# Patient Record
Sex: MALE | Race: WHITE | Employment: OTHER | URBAN - NONMETROPOLITAN AREA
[De-identification: names, ages, dates, MRNs, and addresses within clinical notes are randomized per-mention and may not be internally consistent; named-entity substitution may affect disease eponyms.]

---

## 2017-01-01 ENCOUNTER — HOSPITAL ENCOUNTER (OUTPATIENT)
Dept: CARDIAC REHAB | Age: 72
Discharge: HOME | End: 2017-01-31
Admitting: INTERNAL MEDICINE

## 2017-01-04 LAB — GLUCOSE BLD-MCNC: 159 MG/DL (ref 70–99)

## 2017-01-06 LAB — GLUCOSE BLD-MCNC: 136 MG/DL (ref 70–99)

## 2017-01-09 LAB — GLUCOSE BLD-MCNC: 145 MG/DL (ref 70–99)

## 2017-01-11 LAB — GLUCOSE BLD-MCNC: 143 MG/DL (ref 70–99)

## 2017-01-23 ENCOUNTER — HOSPITAL ENCOUNTER (OUTPATIENT)
Dept: CARDIAC REHAB | Age: 72
Discharge: HOME | End: 2017-01-31
Attending: INTERNAL MEDICINE | Admitting: INTERNAL MEDICINE

## 2017-02-01 ENCOUNTER — HOSPITAL ENCOUNTER (OUTPATIENT)
Dept: CARDIAC REHAB | Age: 72
Discharge: HOME | End: 2017-02-28
Attending: INTERNAL MEDICINE | Admitting: INTERNAL MEDICINE

## 2017-02-13 RX ORDER — CARVEDILOL 6.25 MG/1
6.25 TABLET ORAL 2 TIMES DAILY
Qty: 180 TABLET | Refills: 3 | Status: SHIPPED | OUTPATIENT
Start: 2017-02-13 | End: 2019-01-24 | Stop reason: SDUPTHER

## 2017-03-01 ENCOUNTER — HOSPITAL ENCOUNTER (OUTPATIENT)
Dept: CARDIAC REHAB | Age: 72
Discharge: HOME | End: 2017-03-02
Attending: INTERNAL MEDICINE | Admitting: INTERNAL MEDICINE

## 2017-03-01 ENCOUNTER — HOSPITAL ENCOUNTER (OUTPATIENT)
Dept: CARDIAC REHAB | Age: 72
Discharge: HOME | End: 2017-03-31
Attending: INTERNAL MEDICINE | Admitting: INTERNAL MEDICINE

## 2017-03-30 ENCOUNTER — OFFICE VISIT (OUTPATIENT)
Dept: CARDIOLOGY CLINIC | Age: 72
End: 2017-03-30

## 2017-03-30 VITALS
HEIGHT: 71 IN | RESPIRATION RATE: 12 BRPM | WEIGHT: 227 LBS | OXYGEN SATURATION: 94 % | DIASTOLIC BLOOD PRESSURE: 62 MMHG | HEART RATE: 66 BPM | BODY MASS INDEX: 31.78 KG/M2 | SYSTOLIC BLOOD PRESSURE: 110 MMHG

## 2017-03-30 DIAGNOSIS — E11.59 TYPE 2 DIABETES MELLITUS WITH OTHER CIRCULATORY COMPLICATION: ICD-10-CM

## 2017-03-30 DIAGNOSIS — I10 ESSENTIAL HYPERTENSION: ICD-10-CM

## 2017-03-30 DIAGNOSIS — I25.810 CORONARY ATHEROSCLEROSIS OF AUTOLOGOUS ARTERY BYPASS GRAFT WITHOUT ANGINA: ICD-10-CM

## 2017-03-30 DIAGNOSIS — Z95.1 S/P CABG X 4: Primary | ICD-10-CM

## 2017-03-30 DIAGNOSIS — E78.5 DYSLIPIDEMIA: ICD-10-CM

## 2017-03-30 PROCEDURE — 99214 OFFICE O/P EST MOD 30 MIN: CPT | Performed by: INTERNAL MEDICINE

## 2017-04-01 ENCOUNTER — HOSPITAL ENCOUNTER (OUTPATIENT)
Dept: CARDIAC REHAB | Age: 72
Discharge: HOME | End: 2017-04-30
Attending: INTERNAL MEDICINE | Admitting: INTERNAL MEDICINE

## 2017-05-01 ENCOUNTER — HOSPITAL ENCOUNTER (OUTPATIENT)
Dept: CARDIAC REHAB | Age: 72
Discharge: HOME | End: 2017-05-31
Attending: INTERNAL MEDICINE | Admitting: INTERNAL MEDICINE

## 2017-06-01 ENCOUNTER — HOSPITAL ENCOUNTER (OUTPATIENT)
Dept: CARDIAC REHAB | Age: 72
Discharge: HOME | End: 2017-06-30
Attending: INTERNAL MEDICINE | Admitting: INTERNAL MEDICINE

## 2017-06-06 ENCOUNTER — HOSPITAL ENCOUNTER (OUTPATIENT)
Dept: LAB | Age: 72
Discharge: HOME | End: 2017-06-06
Attending: FAMILY MEDICINE | Admitting: FAMILY MEDICINE

## 2017-06-06 LAB
ALBUMIN SERPL-MCNC: 4.1 GM/DL (ref 3.4–5)
ALP BLD-CCNC: 48 IU/L (ref 40–129)
ALT SERPL-CCNC: 26 U/L (ref 10–40)
ANION GAP SERPL CALCULATED.3IONS-SCNC: 9 MMOL/L (ref 4–16)
AST SERPL-CCNC: 24 IU/L (ref 15–37)
BILIRUB SERPL-MCNC: 0.5 MG/DL (ref 0–1)
BUN BLDV-MCNC: 15 MG/DL (ref 6–23)
CALCIUM SERPL-MCNC: 9.3 MG/DL (ref 8.3–10.6)
CHLORIDE BLD-SCNC: 102 MMOL/L (ref 99–110)
CHOLESTEROL, FASTING: 152 MG/DL
CO2: 26 MMOL/L (ref 21–32)
CREAT SERPL-MCNC: 1.1 MG/DL (ref 0.9–1.3)
ESTIMATED AVERAGE GLUCOSE: 157 MG/DL
GFR AFRICAN AMERICAN: >60 ML/MIN/1.73M2
GFR NON-AFRICAN AMERICAN: >60 ML/MIN/1.73M2
GLUCOSE FASTING: 178 MG/DL (ref 70–99)
HBA1C MFR BLD: 7.1 % (ref 4.2–6.3)
HDLC SERPL-MCNC: 36 MG/DL
LDL CHOLESTEROL DIRECT: 87 MG/DL
POTASSIUM SERPL-SCNC: 4 MMOL/L (ref 3.5–5.1)
PROSTATE SPECIFIC ANTIGEN: 2.86 NG/ML (ref 0–4)
SODIUM BLD-SCNC: 137 MMOL/L (ref 135–145)
TOTAL PROTEIN: 7.5 GM/DL (ref 6.4–8.2)
TRIGLYCERIDE, FASTING: 181 MG/DL
TSH HIGH SENSITIVITY: 2.18 UIU/ML (ref 0.27–4.2)

## 2017-07-01 ENCOUNTER — HOSPITAL ENCOUNTER (OUTPATIENT)
Dept: CARDIAC REHAB | Age: 72
Discharge: HOME | End: 2017-07-31
Attending: INTERNAL MEDICINE | Admitting: INTERNAL MEDICINE

## 2017-08-01 ENCOUNTER — HOSPITAL ENCOUNTER (OUTPATIENT)
Dept: CARDIAC REHAB | Age: 72
Discharge: HOME | End: 2017-08-31
Attending: INTERNAL MEDICINE | Admitting: INTERNAL MEDICINE

## 2017-09-01 ENCOUNTER — HOSPITAL ENCOUNTER (OUTPATIENT)
Dept: CARDIAC REHAB | Age: 72
Discharge: HOME | End: 2017-09-30
Attending: INTERNAL MEDICINE | Admitting: INTERNAL MEDICINE

## 2017-10-01 ENCOUNTER — HOSPITAL ENCOUNTER (OUTPATIENT)
Dept: CARDIAC REHAB | Age: 72
Discharge: HOME | End: 2017-10-31
Attending: INTERNAL MEDICINE | Admitting: INTERNAL MEDICINE

## 2017-10-05 ENCOUNTER — OFFICE VISIT (OUTPATIENT)
Dept: CARDIOLOGY CLINIC | Age: 72
End: 2017-10-05

## 2017-10-05 VITALS
HEIGHT: 71 IN | SYSTOLIC BLOOD PRESSURE: 112 MMHG | WEIGHT: 228 LBS | RESPIRATION RATE: 16 BRPM | BODY MASS INDEX: 31.92 KG/M2 | HEART RATE: 68 BPM | DIASTOLIC BLOOD PRESSURE: 70 MMHG

## 2017-10-05 DIAGNOSIS — I25.810 CORONARY ATHEROSCLEROSIS OF AUTOLOGOUS ARTERY BYPASS GRAFT WITHOUT ANGINA: ICD-10-CM

## 2017-10-05 DIAGNOSIS — Z95.1 S/P CABG X 4: ICD-10-CM

## 2017-10-05 DIAGNOSIS — E11.59 TYPE 2 DIABETES MELLITUS WITH OTHER CIRCULATORY COMPLICATION, UNSPECIFIED LONG TERM INSULIN USE STATUS: Primary | ICD-10-CM

## 2017-10-05 DIAGNOSIS — I10 ESSENTIAL HYPERTENSION: ICD-10-CM

## 2017-10-05 DIAGNOSIS — E78.5 DYSLIPIDEMIA: ICD-10-CM

## 2017-10-05 PROCEDURE — 99214 OFFICE O/P EST MOD 30 MIN: CPT | Performed by: INTERNAL MEDICINE

## 2017-10-05 NOTE — PROGRESS NOTES
OFFICE FOLLOW UP NOTE  Chief Complaint: post CABG X4     HPI:   Mike Peng is a 80y.o. year old who has history as noted below. 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 Doing well in cardiac rehab with no complaints   Exercise tolerance has improved. He is happy with his results. No chest pain, no shortness of breath . Scar has healed well. He had  CABG X4 after presenting with unstable angina in Sept 2016 . He had  LHC showing multivessel disease . His initial presentation was shortness of breath . He is C/o some back pain, left shoulder pain, cannot sleep on left side. HE feels there is a knot between his shoulder blades. Current Outpatient Prescriptions   Medication Sig Dispense Refill    carvedilol (COREG) 6.25 MG tablet Take 1 tablet by mouth 2 times daily 180 tablet 3    valsartan (DIOVAN) 80 MG tablet Take 80 mg by mouth daily      Acetaminophen (TYLENOL PO) Take by mouth as needed Over The Counter      aspirin 81 MG EC tablet Take 81 mg by mouth every morning Over The Counter      metFORMIN (GLUCOPHAGE) 500 MG tablet Take 500 mg by mouth 2 times daily Take Two Twice Daily      rosuvastatin (CRESTOR) 10 MG tablet Take 10 mg by mouth every morning       sitaGLIPtin (JANUVIA) 100 MG tablet Take 100 mg by mouth every morning       canagliflozin (INVOKANA) 300 MG TABS tablet Take 300 mg by mouth every morning       insulin glargine (LANTUS SOLOSTAR) 100 UNIT/ML injection pen Inject 32 Units into the skin nightly        No current facility-administered medications for this visit. Allergies:   Exenatide;  Lisinopril; and Sulfa antibiotics    Patient History:  Past Medical History:   Diagnosis Date    Acid reflux     Anemia     Arthritis     \"Wrists, Knees, Ankles And Spine\"    Belching     \"All The Time\"    CAD (coronary artery disease)     Sees Dr. Frannie Gallegos Chronic back pain     [facility] Dx [facility]'s    \"I Go To [facility] For [facility], I See Addy Casiano CNP\"    Difficult intubation     Diverticulitis Dx Early 18's    H/O echocardiogram 07/25/2016    The left ventricular size is normal. There is mild concentric LVH. Trace to mild MR     H/O exercise stress test 07/25/2016    Patient tolerated well without focal complaints. Heart rate response Appropriate. Normal findings.      H/O percutaneous left heart catheterization 09/02/2016    LAD 80%, Cx 80%, RCA 50% prox, PDA 90% mid    Hyperlipidemia     Hypertension     Mononucleosis Dx 1964    Pleurisy Dx 1964    Ringing in the ears     S/P CABG x 4 09/13/2016    Lima to LAD & D1, SVG PDA & Cx M1    Shortness of breath on exertion     Wears glasses      Past Surgical History:   Procedure Laterality Date    CARDIAC CATHETERIZATION  09/02/2016    CARDIAC SURGERY  09/13/2016    CABG x 4 Lima to the LAD and D1, SVG to PDA, SVG to CX M1    COLONOSCOPY  Early 1970's    ENDOSCOPY, COLON, DIAGNOSTIC  Late 1980's    LEG SURGERY Right 2000's    Gangrene Right Lower Leg    OTHER SURGICAL HISTORY Bilateral Last Done In 1990's    \"Multiple Ear Surgeries To Patch Ear Drum\"    WRIST SURGERY Right 1998 Or 1999    \"Lump Removed Top Of Right Wrist, Pinched Ulnar Nerve Also Done\"     Family History   Problem Relation Age of Onset    Heart Disease Mother      Open Heart Surgery    Vision Loss Mother     Hearing Loss Mother     Cancer Father      Prostate Cancer    Heart Disease Father      Open Heart Surgery    Heart Disease Brother     Arthritis Brother     Diabetes Son    [facility] Other Son      \"2 Surgeries On Pancreas\"     Social History   Substance Use Topics    Smoking status: Former Smoker     Packs/day: 1.50     Years: 6.00     Types: Cigarettes     Start date: 1964     Quit date: 1970    Smokeless tobacco: Never Used    Alcohol use Yes      Comment: \"Very Seldom, Maybe Once Every Month Or More\"        Review of Systems:   · Constitutional: No Fever or Weight Loss   · Eyes: No Decreased Vision  · ENT: No Headaches, Hearing Loss or Vertigo  · Cardiovascular: as per note above   · Respiratory: No cough or wheezing and as per note above. · Gastrointestinal: No abdominal pain, appetite loss, blood in stools, constipation, diarrhea or heartburn  · Genitourinary: No dysuria, trouble voiding, or hematuria  · Musculoskeletal:  None  · Integumentary: No rash or pruritis  · Neurological: No TIA or stroke symptoms  · Psychiatric: No anxiety or depression  · Endocrine: No malaise, fatigue or temperature intolerance  · Hematologic/Lymphatic: No bleeding problems, blood clots or swollen lymph nodes  · Allergic/Immunologic: No nasal congestion or hives    Objective:      Physical Exam:  /70 (Site: Left Arm, Position: Sitting, Cuff Size: Large Adult)  Pulse 68  Resp 16  Ht 5' 11\" (1.803 m)  Wt 228 lb (103.4 kg)  BMI 31.8 kg/m2  Wt Readings from Last 3 Encounters:   10/05/17 228 lb (103.4 kg)   03/30/17 227 lb (103 kg)   10/19/16 211 lb 3.2 oz (95.8 kg)     Body mass index is 31.8 kg/(m^2). GENERAL - Alert, oriented, pleasant, in no apparent distress. Head unremarkable  Eyes -  Not injected conjunctiva  ENT - Normal mucosa  Neck - Supple. No jugular venous distention noted. No carotid bruits. Cardiovascular - Normal S1 and S2 without obvious murmur or gallop. No signs of volume over load. Rate and rhythm is regular, surgical scar healing well. Sternal wound is healed. Stable   Extremities - No cyanosis, clubbing, or significant edema. Pulmonary - No respiratory distress. No wheezes or rales. Pulses - Bilateral radial and pedal pulses normal  Abdomen - No tenderness  Musculoskeletal - Normal strength  Neurologic - There are no gross focal neurologic abnormalities.   Skin - No rash  Affect - Normal mood      Lab Results   Component Value Date    LABA1C 7.1 (H) 06/06/2017    LABA1C 7.3 (H) 09/02/2016     Lab Results   Component Value Date    CHOL 137 03/15/2016    TRIG 180 (H) 03/15/2016    HDL 36 (L) 06/06/2017    LDLDIRECT 87 06/06/2017     Lab Results   Component Value Date    ALT 26 06/06/2017    AST 24 06/06/2017         Assessment & Plan:     1. Type 2 diabetes mellitus with other circulatory complication, unspecified long term insulin use status    2. S/P CABG x 4    3. Essential hypertension    4. 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 Dyslipidemia         No problem-specific Assessment & Plan notes found for this encounter. S/P CABG x 4  CABG X 4 Endarterectomy Cx marginal,LIMA to lad and D1,SVG to PDA,SVG to Cx M1In Sept 2016. Finished rehab doing very well. , exercise tolerance is good. He is back to wroking on [employer].     Essential hypertension  Bp at home is well controlled.     Type 2 diabetes mellitus with circulatory disorder ([address]  Very good control, Log shows fasting sugars in 140's last Hba 1c was 7.2 in 2016 . Goes to [facility] endocrinology     Dyslipidemia :  He had lab work recently,  Lipid profile was reviewed with patient. He will have repeat lab work with dr Stuart Daniel in few weeks. Return in about 6 months (around 4/5/2018).

## 2017-10-05 NOTE — MR AVS SNAPSHOT
After Visit Summary             Rober Stuart   10/5/2017 10:20 AM   Office Visit    Description:  Male : 1945   Provider:  Demi Leblanc MD   Department:  Cardiology [facility]] and Future Appointments         Below is a list of your follow-up and future appointments. This may not be a complete list as you may have made appointments directly with providers that we are not aware of or your providers may have made some for you. Please call your providers to confirm appointments. It is important to keep your appointments. Please bring your current insurance card, photo ID, co-pay, and all medication bottles to your appointment. If self-pay, payment is expected at the time of service. Your To-Do List     Future Appointments Provider Department Dept Phone    2018 2:00 PM Demi Leblanc MD Cardiology [address] [place] [address] Drive [phone]    Please arrive 15 minutes prior to appointment, bring photo ID and insurance card. Follow-Up    Return in about 6 months (around 2018). Information from Your Visit        Department     Name Address Phone Fax    Cardiology [facility] [address] [address] [phone]      You Were Seen for:         Comments    Type 2 diabetes mellitus with other circulatory complication, unspecified long term insulin use status   [[id number]]         Vital Signs     Blood Pressure Pulse Respirations Height Weight Body Mass Index    112/70 (Site: Left Arm, Position: Sitting, Cuff Size: Large Adult) 68 16 5' 11\" (1.803 m) 228 lb (103.4 kg) 31.8 kg/m2    Smoking Status                   Former Smoker           Additional Information about your Body Mass Index (BMI)           Your BMI as listed above is considered obese (30 or more). BMI is an estimate of body fat, calculated from your height and weight.   The higher Hepatitis C screening is recommended for all adults regardless of risk factors born between [facility] at least once (lifetime) who have never been tested. 1945    Diabetic Foot Exam 10/6/1956    Eye Exam By An Eye Doctor 10/6/1956    Tetanus Combination Vaccine (1 - Tdap) 10/6/1965    Colonoscopy 10/6/1996    Zoster Vaccine 10/6/2006    Pneumococcal Vaccines (two) for all adults aged 72 and over (1 of 2 - PCV13) 10/6/2011    Urine Check For Kidney Problems 3/15/2017    Yearly Flu Vaccine (1) 9/1/2017    Hemoglobin A1C (Test For Long-Term Glucose Control) 6/6/2018    Cholesterol Screening 6/6/2018            Kerat Signup           Our records indicate that you have an active [employer] account. You can view your After Visit Summary by going to [email or web address] ([email or web address]) and logging in with your [employer] username and password. If you don't have a [employer] username and password but a parent or guardian has access to your record, the parent or guardian should login with their own [employer] username and password and access your record to view the After Visit Summary. Additional Information  If you have questions, please contact the physician practice where you receive care. Remember, [employer] is NOT to be used for urgent needs. For medical emergencies, dial 911. For questions regarding your [employer] account call [phone]. If you have a clinical question, please call your doctor's office.

## 2017-11-01 ENCOUNTER — HOSPITAL ENCOUNTER (OUTPATIENT)
Dept: CARDIAC REHAB | Age: 72
Discharge: HOME | End: 2017-11-30
Attending: INTERNAL MEDICINE | Admitting: INTERNAL MEDICINE

## 2017-12-01 ENCOUNTER — HOSPITAL ENCOUNTER (OUTPATIENT)
Dept: CARDIAC REHAB | Age: 72
Discharge: HOME | End: 2017-12-31
Attending: INTERNAL MEDICINE | Admitting: INTERNAL MEDICINE

## 2018-01-01 ENCOUNTER — HOSPITAL ENCOUNTER (OUTPATIENT)
Dept: CARDIAC REHAB | Age: 73
Discharge: HOME | End: 2018-01-31
Attending: INTERNAL MEDICINE | Admitting: INTERNAL MEDICINE

## 2018-02-01 ENCOUNTER — HOSPITAL ENCOUNTER (OUTPATIENT)
Dept: CARDIAC REHAB | Age: 73
Discharge: HOME | End: 2018-02-28
Attending: INTERNAL MEDICINE | Admitting: INTERNAL MEDICINE

## 2018-03-01 ENCOUNTER — HOSPITAL ENCOUNTER (OUTPATIENT)
Dept: CARDIAC REHAB | Age: 73
Discharge: HOME | End: 2018-03-31
Attending: INTERNAL MEDICINE | Admitting: INTERNAL MEDICINE

## 2018-04-01 ENCOUNTER — HOSPITAL ENCOUNTER (OUTPATIENT)
Dept: CARDIAC REHAB | Age: 73
Discharge: HOME | End: 2018-04-30
Attending: INTERNAL MEDICINE | Admitting: INTERNAL MEDICINE

## 2018-04-12 ENCOUNTER — OFFICE VISIT (OUTPATIENT)
Dept: CARDIOLOGY CLINIC | Age: 73
End: 2018-04-12

## 2018-04-12 VITALS
SYSTOLIC BLOOD PRESSURE: 112 MMHG | BODY MASS INDEX: 32.06 KG/M2 | WEIGHT: 229 LBS | HEART RATE: 76 BPM | DIASTOLIC BLOOD PRESSURE: 66 MMHG | RESPIRATION RATE: 16 BRPM | HEIGHT: 71 IN

## 2018-04-12 DIAGNOSIS — I10 ESSENTIAL HYPERTENSION: ICD-10-CM

## 2018-04-12 DIAGNOSIS — I25.810 CORONARY ATHEROSCLEROSIS OF AUTOLOGOUS ARTERY BYPASS GRAFT WITHOUT ANGINA: Primary | ICD-10-CM

## 2018-04-12 DIAGNOSIS — Z95.1 S/P CABG X 4: ICD-10-CM

## 2018-04-12 DIAGNOSIS — E78.5 DYSLIPIDEMIA: ICD-10-CM

## 2018-04-12 DIAGNOSIS — E11.59 TYPE 2 DIABETES MELLITUS WITH OTHER CIRCULATORY COMPLICATION, UNSPECIFIED LONG TERM INSULIN USE STATUS: ICD-10-CM

## 2018-04-12 PROCEDURE — 1036F TOBACCO NON-USER: CPT | Performed by: INTERNAL MEDICINE

## 2018-04-12 PROCEDURE — G8598 ASA/ANTIPLAT THER USED: HCPCS | Performed by: INTERNAL MEDICINE

## 2018-04-12 PROCEDURE — 3046F HEMOGLOBIN A1C LEVEL >9.0%: CPT | Performed by: INTERNAL MEDICINE

## 2018-04-12 PROCEDURE — 3017F COLORECTAL CA SCREEN DOC REV: CPT | Performed by: INTERNAL MEDICINE

## 2018-04-12 PROCEDURE — 1123F ACP DISCUSS/DSCN MKR DOCD: CPT | Performed by: INTERNAL MEDICINE

## 2018-04-12 PROCEDURE — 2022F DILAT RTA XM EVC RTNOPTHY: CPT | Performed by: INTERNAL MEDICINE

## 2018-04-12 PROCEDURE — G8427 DOCREV CUR MEDS BY ELIG CLIN: HCPCS | Performed by: INTERNAL MEDICINE

## 2018-04-12 PROCEDURE — 4040F PNEUMOC VAC/ADMIN/RCVD: CPT | Performed by: INTERNAL MEDICINE

## 2018-04-12 PROCEDURE — G8417 CALC BMI ABV UP PARAM F/U: HCPCS | Performed by: INTERNAL MEDICINE

## 2018-04-12 PROCEDURE — 99214 OFFICE O/P EST MOD 30 MIN: CPT | Performed by: INTERNAL MEDICINE

## 2018-05-01 ENCOUNTER — HOSPITAL ENCOUNTER (OUTPATIENT)
Dept: CARDIAC REHAB | Age: 73
Discharge: HOME | End: 2018-05-31
Attending: INTERNAL MEDICINE | Admitting: INTERNAL MEDICINE

## 2018-06-11 ENCOUNTER — HOSPITAL ENCOUNTER (OUTPATIENT)
Dept: LAB | Age: 73
Discharge: HOME | End: 2018-06-11
Attending: FAMILY MEDICINE | Admitting: FAMILY MEDICINE

## 2018-06-11 LAB
ALBUMIN SERPL-MCNC: 4.3 GM/DL (ref 3.4–5)
ALP BLD-CCNC: 44 IU/L (ref 40–129)
ALT SERPL-CCNC: 33 U/L (ref 10–40)
ANION GAP SERPL CALCULATED.3IONS-SCNC: 9 MMOL/L (ref 4–16)
AST SERPL-CCNC: 31 IU/L (ref 15–37)
BILIRUB SERPL-MCNC: 0.7 MG/DL (ref 0–1)
BUN BLDV-MCNC: 13 MG/DL (ref 6–23)
CALCIUM SERPL-MCNC: 9.2 MG/DL (ref 8.3–10.6)
CHLORIDE BLD-SCNC: 100 MMOL/L (ref 99–110)
CHOLESTEROL, FASTING: 154 MG/DL
CO2: 31 MMOL/L (ref 21–32)
CREAT SERPL-MCNC: 1.2 MG/DL (ref 0.9–1.3)
CREATININE URINE: 91 MG/DL (ref 39–259)
ESTIMATED AVERAGE GLUCOSE: 160 MG/DL
GFR AFRICAN AMERICAN: >60 ML/MIN/1.73M2
GFR NON-AFRICAN AMERICAN: 60 ML/MIN/1.73M2
GLUCOSE FASTING: 151 MG/DL (ref 70–99)
HBA1C MFR BLD: 7.2 % (ref 4.2–6.3)
HDLC SERPL-MCNC: 35 MG/DL
LDL CHOLESTEROL DIRECT: 96 MG/DL
MICROALBUMIN/CREAT 24H UR: <1.2 MG/DL
MICROALBUMIN/CREAT UR-RTO: NORMAL MG/G CREAT (ref 0–30)
POTASSIUM SERPL-SCNC: 4.2 MMOL/L (ref 3.5–5.1)
PROSTATE SPECIFIC ANTIGEN: 3.83 NG/ML (ref 0–4)
SODIUM BLD-SCNC: 140 MMOL/L (ref 135–145)
TOTAL PROTEIN: 7.7 GM/DL (ref 6.4–8.2)
TRIGLYCERIDE, FASTING: 154 MG/DL

## 2018-08-30 ENCOUNTER — TELEPHONE (OUTPATIENT)
Dept: CARDIOLOGY CLINIC | Age: 73
End: 2018-08-30

## 2018-12-11 ENCOUNTER — HOSPITAL ENCOUNTER (OUTPATIENT)
Age: 73
Discharge: HOME | End: 2018-12-11
Payer: MEDICARE

## 2018-12-11 LAB
ALBUMIN SERPL-MCNC: 4.6 GM/DL (ref 3.4–5)
ALP BLD-CCNC: 53 IU/L (ref 40–129)
ALT SERPL-CCNC: 37 U/L (ref 10–40)
ANION GAP SERPL CALCULATED.3IONS-SCNC: 12 MMOL/L (ref 4–16)
AST SERPL-CCNC: 31 IU/L (ref 15–37)
BILIRUB SERPL-MCNC: 0.9 MG/DL (ref 0–1)
BUN BLDV-MCNC: 16 MG/DL (ref 6–23)
CALCIUM SERPL-MCNC: 9.7 MG/DL (ref 8.3–10.6)
CHLORIDE BLD-SCNC: 100 MMOL/L (ref 99–110)
CHOLESTEROL, FASTING: 135 MG/DL
CO2: 24 MMOL/L (ref 21–32)
CREAT SERPL-MCNC: 1.1 MG/DL (ref 0.9–1.3)
ESTIMATED AVERAGE GLUCOSE: 157 MG/DL
GFR AFRICAN AMERICAN: >60 ML/MIN/1.73M2
GFR NON-AFRICAN AMERICAN: >60 ML/MIN/1.73M2
GLUCOSE FASTING: 145 MG/DL (ref 70–99)
HBA1C MFR BLD: 7.1 % (ref 4.2–6.3)
HDLC SERPL-MCNC: 35 MG/DL
LDL CHOLESTEROL DIRECT: 88 MG/DL
POTASSIUM SERPL-SCNC: 4.2 MMOL/L (ref 3.5–5.1)
SODIUM BLD-SCNC: 136 MMOL/L (ref 135–145)
TOTAL PROTEIN: 7.9 GM/DL (ref 6.4–8.2)
TRIGLYCERIDE, FASTING: 135 MG/DL

## 2018-12-11 PROCEDURE — 83036 HEMOGLOBIN GLYCOSYLATED A1C: CPT

## 2018-12-11 PROCEDURE — 80061 LIPID PANEL: CPT

## 2018-12-11 PROCEDURE — 80053 COMPREHEN METABOLIC PANEL: CPT

## 2018-12-11 PROCEDURE — 36415 COLL VENOUS BLD VENIPUNCTURE: CPT

## 2019-01-17 ENCOUNTER — OFFICE VISIT (OUTPATIENT)
Dept: CARDIOLOGY CLINIC | Age: 74
End: 2019-01-17
Payer: MEDICARE

## 2019-01-17 VITALS
WEIGHT: 223 LBS | HEIGHT: 71 IN | HEART RATE: 68 BPM | RESPIRATION RATE: 16 BRPM | DIASTOLIC BLOOD PRESSURE: 72 MMHG | BODY MASS INDEX: 31.22 KG/M2 | SYSTOLIC BLOOD PRESSURE: 116 MMHG

## 2019-01-17 DIAGNOSIS — I10 ESSENTIAL HYPERTENSION: ICD-10-CM

## 2019-01-17 DIAGNOSIS — I25.810 CORONARY ATHEROSCLEROSIS OF AUTOLOGOUS ARTERY BYPASS GRAFT WITHOUT ANGINA: ICD-10-CM

## 2019-01-17 DIAGNOSIS — Z95.1 S/P CABG X 4: Primary | ICD-10-CM

## 2019-01-17 DIAGNOSIS — E78.5 DYSLIPIDEMIA: ICD-10-CM

## 2019-01-17 DIAGNOSIS — E11.59 TYPE 2 DIABETES MELLITUS WITH OTHER CIRCULATORY COMPLICATION, UNSPECIFIED WHETHER LONG TERM INSULIN USE (HCC): ICD-10-CM

## 2019-01-17 PROCEDURE — 3017F COLORECTAL CA SCREEN DOC REV: CPT | Performed by: INTERNAL MEDICINE

## 2019-01-17 PROCEDURE — 93000 ELECTROCARDIOGRAM COMPLETE: CPT | Performed by: INTERNAL MEDICINE

## 2019-01-17 PROCEDURE — G8417 CALC BMI ABV UP PARAM F/U: HCPCS | Performed by: INTERNAL MEDICINE

## 2019-01-17 PROCEDURE — G8427 DOCREV CUR MEDS BY ELIG CLIN: HCPCS | Performed by: INTERNAL MEDICINE

## 2019-01-17 PROCEDURE — G8484 FLU IMMUNIZE NO ADMIN: HCPCS | Performed by: INTERNAL MEDICINE

## 2019-01-17 PROCEDURE — 1123F ACP DISCUSS/DSCN MKR DOCD: CPT | Performed by: INTERNAL MEDICINE

## 2019-01-17 PROCEDURE — 1036F TOBACCO NON-USER: CPT | Performed by: INTERNAL MEDICINE

## 2019-01-17 PROCEDURE — 2022F DILAT RTA XM EVC RTNOPTHY: CPT | Performed by: INTERNAL MEDICINE

## 2019-01-17 PROCEDURE — G8598 ASA/ANTIPLAT THER USED: HCPCS | Performed by: INTERNAL MEDICINE

## 2019-01-17 PROCEDURE — 3046F HEMOGLOBIN A1C LEVEL >9.0%: CPT | Performed by: INTERNAL MEDICINE

## 2019-01-17 PROCEDURE — 99214 OFFICE O/P EST MOD 30 MIN: CPT | Performed by: INTERNAL MEDICINE

## 2019-01-17 PROCEDURE — 4040F PNEUMOC VAC/ADMIN/RCVD: CPT | Performed by: INTERNAL MEDICINE

## 2019-01-17 PROCEDURE — 1101F PT FALLS ASSESS-DOCD LE1/YR: CPT | Performed by: INTERNAL MEDICINE

## 2019-01-17 RX ORDER — ISOSORBIDE MONONITRATE 30 MG/1
30 TABLET, EXTENDED RELEASE ORAL DAILY
Qty: 30 TABLET | Refills: 3 | Status: SHIPPED | OUTPATIENT
Start: 2019-01-17 | End: 2019-03-28 | Stop reason: SDUPTHER

## 2019-01-17 RX ORDER — LOSARTAN POTASSIUM 50 MG/1
50 TABLET ORAL DAILY
COMMUNITY
End: 2019-03-28 | Stop reason: SDUPTHER

## 2019-01-22 ENCOUNTER — PROCEDURE VISIT (OUTPATIENT)
Dept: CARDIOLOGY CLINIC | Age: 74
End: 2019-01-22
Payer: MEDICARE

## 2019-01-22 DIAGNOSIS — R06.09 DYSPNEA ON EXERTION: Primary | ICD-10-CM

## 2019-01-22 DIAGNOSIS — E11.59 TYPE 2 DIABETES MELLITUS WITH OTHER CIRCULATORY COMPLICATION, UNSPECIFIED WHETHER LONG TERM INSULIN USE (HCC): ICD-10-CM

## 2019-01-22 DIAGNOSIS — I10 ESSENTIAL HYPERTENSION: ICD-10-CM

## 2019-01-22 DIAGNOSIS — Z95.1 S/P CABG X 4: ICD-10-CM

## 2019-01-22 DIAGNOSIS — E78.5 DYSLIPIDEMIA: ICD-10-CM

## 2019-01-22 DIAGNOSIS — I25.810 CORONARY ATHEROSCLEROSIS OF AUTOLOGOUS ARTERY BYPASS GRAFT WITHOUT ANGINA: ICD-10-CM

## 2019-01-22 DIAGNOSIS — R06.02 SHORTNESS OF BREATH: Primary | ICD-10-CM

## 2019-01-22 LAB
LV EF: 58 %
LV EF: 71 %
LVEF MODALITY: NORMAL
LVEF MODALITY: NORMAL

## 2019-01-22 PROCEDURE — 78452 HT MUSCLE IMAGE SPECT MULT: CPT | Performed by: INTERNAL MEDICINE

## 2019-01-22 PROCEDURE — 93018 CV STRESS TEST I&R ONLY: CPT | Performed by: INTERNAL MEDICINE

## 2019-01-22 PROCEDURE — 93306 TTE W/DOPPLER COMPLETE: CPT | Performed by: INTERNAL MEDICINE

## 2019-01-22 PROCEDURE — 93017 CV STRESS TEST TRACING ONLY: CPT | Performed by: INTERNAL MEDICINE

## 2019-01-22 PROCEDURE — 93016 CV STRESS TEST SUPVJ ONLY: CPT | Performed by: INTERNAL MEDICINE

## 2019-01-22 PROCEDURE — A9500 TC99M SESTAMIBI: HCPCS | Performed by: INTERNAL MEDICINE

## 2019-01-22 RX ORDER — NITROGLYCERIN 0.4 MG/1
0.4 TABLET SUBLINGUAL EVERY 5 MIN PRN
COMMUNITY
End: 2019-01-22 | Stop reason: SDUPTHER

## 2019-01-22 RX ORDER — NITROGLYCERIN 0.4 MG/1
0.4 TABLET SUBLINGUAL EVERY 5 MIN PRN
Qty: 25 TABLET | Refills: 3 | Status: SHIPPED | OUTPATIENT
Start: 2019-01-22 | End: 2019-03-28

## 2019-01-24 ENCOUNTER — TELEPHONE (OUTPATIENT)
Dept: CARDIOLOGY CLINIC | Age: 74
End: 2019-01-24

## 2019-01-24 ENCOUNTER — OFFICE VISIT (OUTPATIENT)
Dept: CARDIOLOGY CLINIC | Age: 74
End: 2019-01-24
Payer: MEDICARE

## 2019-01-24 VITALS
HEART RATE: 70 BPM | WEIGHT: 226 LBS | SYSTOLIC BLOOD PRESSURE: 102 MMHG | HEIGHT: 71 IN | DIASTOLIC BLOOD PRESSURE: 64 MMHG | BODY MASS INDEX: 31.64 KG/M2

## 2019-01-24 DIAGNOSIS — E78.5 DYSLIPIDEMIA: ICD-10-CM

## 2019-01-24 DIAGNOSIS — I25.810 CORONARY ATHEROSCLEROSIS OF AUTOLOGOUS ARTERY BYPASS GRAFT WITHOUT ANGINA: ICD-10-CM

## 2019-01-24 DIAGNOSIS — Z95.1 S/P CABG X 4: Primary | ICD-10-CM

## 2019-01-24 DIAGNOSIS — I10 ESSENTIAL HYPERTENSION: ICD-10-CM

## 2019-01-24 PROCEDURE — G8484 FLU IMMUNIZE NO ADMIN: HCPCS | Performed by: INTERNAL MEDICINE

## 2019-01-24 PROCEDURE — 1036F TOBACCO NON-USER: CPT | Performed by: INTERNAL MEDICINE

## 2019-01-24 PROCEDURE — 99214 OFFICE O/P EST MOD 30 MIN: CPT | Performed by: INTERNAL MEDICINE

## 2019-01-24 PROCEDURE — 4040F PNEUMOC VAC/ADMIN/RCVD: CPT | Performed by: INTERNAL MEDICINE

## 2019-01-24 PROCEDURE — G8417 CALC BMI ABV UP PARAM F/U: HCPCS | Performed by: INTERNAL MEDICINE

## 2019-01-24 PROCEDURE — 3017F COLORECTAL CA SCREEN DOC REV: CPT | Performed by: INTERNAL MEDICINE

## 2019-01-24 PROCEDURE — G8427 DOCREV CUR MEDS BY ELIG CLIN: HCPCS | Performed by: INTERNAL MEDICINE

## 2019-01-24 PROCEDURE — 1123F ACP DISCUSS/DSCN MKR DOCD: CPT | Performed by: INTERNAL MEDICINE

## 2019-01-24 PROCEDURE — 1101F PT FALLS ASSESS-DOCD LE1/YR: CPT | Performed by: INTERNAL MEDICINE

## 2019-01-24 PROCEDURE — G8598 ASA/ANTIPLAT THER USED: HCPCS | Performed by: INTERNAL MEDICINE

## 2019-01-24 RX ORDER — CARVEDILOL 6.25 MG/1
6.25 TABLET ORAL 2 TIMES DAILY
Qty: 180 TABLET | Refills: 3 | Status: SHIPPED | OUTPATIENT
Start: 2019-01-24 | End: 2019-12-05 | Stop reason: SDUPTHER

## 2019-01-24 RX ORDER — NITROGLYCERIN 0.4 MG/1
0.4 TABLET SUBLINGUAL EVERY 5 MIN PRN
Qty: 25 TABLET | Refills: 3 | Status: SHIPPED | OUTPATIENT
Start: 2019-01-24

## 2019-01-28 ENCOUNTER — TELEPHONE (OUTPATIENT)
Dept: CARDIOLOGY CLINIC | Age: 74
End: 2019-01-28

## 2019-03-28 ENCOUNTER — OFFICE VISIT (OUTPATIENT)
Dept: CARDIOLOGY CLINIC | Age: 74
End: 2019-03-28
Payer: MEDICARE

## 2019-03-28 VITALS
DIASTOLIC BLOOD PRESSURE: 70 MMHG | SYSTOLIC BLOOD PRESSURE: 116 MMHG | HEART RATE: 68 BPM | RESPIRATION RATE: 16 BRPM | BODY MASS INDEX: 30.8 KG/M2 | WEIGHT: 220 LBS | HEIGHT: 71 IN

## 2019-03-28 DIAGNOSIS — I10 ESSENTIAL HYPERTENSION: ICD-10-CM

## 2019-03-28 DIAGNOSIS — E78.5 DYSLIPIDEMIA: ICD-10-CM

## 2019-03-28 DIAGNOSIS — I25.810 CORONARY ATHEROSCLEROSIS OF AUTOLOGOUS ARTERY BYPASS GRAFT WITHOUT ANGINA: Primary | ICD-10-CM

## 2019-03-28 PROCEDURE — 3017F COLORECTAL CA SCREEN DOC REV: CPT | Performed by: NURSE PRACTITIONER

## 2019-03-28 PROCEDURE — 99213 OFFICE O/P EST LOW 20 MIN: CPT | Performed by: NURSE PRACTITIONER

## 2019-03-28 PROCEDURE — 4040F PNEUMOC VAC/ADMIN/RCVD: CPT | Performed by: NURSE PRACTITIONER

## 2019-03-28 PROCEDURE — 1123F ACP DISCUSS/DSCN MKR DOCD: CPT | Performed by: NURSE PRACTITIONER

## 2019-03-28 PROCEDURE — G8417 CALC BMI ABV UP PARAM F/U: HCPCS | Performed by: NURSE PRACTITIONER

## 2019-03-28 PROCEDURE — G8427 DOCREV CUR MEDS BY ELIG CLIN: HCPCS | Performed by: NURSE PRACTITIONER

## 2019-03-28 PROCEDURE — G8484 FLU IMMUNIZE NO ADMIN: HCPCS | Performed by: NURSE PRACTITIONER

## 2019-03-28 PROCEDURE — G8598 ASA/ANTIPLAT THER USED: HCPCS | Performed by: NURSE PRACTITIONER

## 2019-03-28 PROCEDURE — 1036F TOBACCO NON-USER: CPT | Performed by: NURSE PRACTITIONER

## 2019-03-28 RX ORDER — LOSARTAN POTASSIUM 50 MG/1
50 TABLET ORAL DAILY
Qty: 90 TABLET | Refills: 3 | Status: SHIPPED | OUTPATIENT
Start: 2019-03-28 | End: 2019-12-05 | Stop reason: SDUPTHER

## 2019-03-28 RX ORDER — ISOSORBIDE MONONITRATE 30 MG/1
30 TABLET, EXTENDED RELEASE ORAL DAILY
Qty: 90 TABLET | Refills: 3 | Status: SHIPPED | OUTPATIENT
Start: 2019-03-28 | End: 2019-12-05 | Stop reason: SDUPTHER

## 2019-06-19 ENCOUNTER — HOSPITAL ENCOUNTER (OUTPATIENT)
Age: 74
Discharge: HOME | End: 2019-06-19
Payer: MEDICARE

## 2019-06-19 LAB
ALBUMIN SERPL-MCNC: 4.2 GM/DL (ref 3.4–5)
ALP BLD-CCNC: 45 IU/L (ref 40–129)
ALT SERPL-CCNC: 35 U/L (ref 10–40)
ANION GAP SERPL CALCULATED.3IONS-SCNC: 7 MMOL/L (ref 4–16)
AST SERPL-CCNC: 28 IU/L (ref 15–37)
BILIRUB SERPL-MCNC: 0.8 MG/DL (ref 0–1)
BUN BLDV-MCNC: 17 MG/DL (ref 6–23)
CALCIUM SERPL-MCNC: 9.6 MG/DL (ref 8.3–10.6)
CHLORIDE BLD-SCNC: 101 MMOL/L (ref 99–110)
CHOLESTEROL, FASTING: 135 MG/DL
CO2: 31 MMOL/L (ref 21–32)
CREAT SERPL-MCNC: 1.1 MG/DL (ref 0.9–1.3)
CREATININE URINE: 86.2 MG/DL (ref 39–259)
ESTIMATED AVERAGE GLUCOSE: 163 MG/DL
GFR AFRICAN AMERICAN: >60 ML/MIN/1.73M2
GFR NON-AFRICAN AMERICAN: >60 ML/MIN/1.73M2
GLUCOSE FASTING: 170 MG/DL (ref 70–99)
HBA1C MFR BLD: 7.3 % (ref 4.2–6.3)
HDLC SERPL-MCNC: 34 MG/DL
LDL CHOLESTEROL DIRECT: 88 MG/DL
MICROALBUMIN/CREAT 24H UR: <1.2 MG/DL
MICROALBUMIN/CREAT UR-RTO: NORMAL MG/G CREAT (ref 0–30)
POTASSIUM SERPL-SCNC: 4 MMOL/L (ref 3.5–5.1)
PROSTATE SPECIFIC ANTIGEN: 3.37 NG/ML (ref 0–4)
SODIUM BLD-SCNC: 139 MMOL/L (ref 135–145)
TOTAL PROTEIN: 7.7 GM/DL (ref 6.4–8.2)
TRIGLYCERIDE, FASTING: 138 MG/DL

## 2019-06-19 PROCEDURE — 80061 LIPID PANEL: CPT

## 2019-06-19 PROCEDURE — 36415 COLL VENOUS BLD VENIPUNCTURE: CPT

## 2019-06-19 PROCEDURE — 82043 UR ALBUMIN QUANTITATIVE: CPT

## 2019-06-19 PROCEDURE — G0103 PSA SCREENING: HCPCS

## 2019-06-19 PROCEDURE — 83036 HEMOGLOBIN GLYCOSYLATED A1C: CPT

## 2019-06-19 PROCEDURE — 82570 ASSAY OF URINE CREATININE: CPT

## 2019-06-19 PROCEDURE — 80053 COMPREHEN METABOLIC PANEL: CPT

## 2019-07-11 ENCOUNTER — OFFICE VISIT (OUTPATIENT)
Dept: CARDIOLOGY CLINIC | Age: 74
End: 2019-07-11
Payer: MEDICARE

## 2019-07-11 VITALS
WEIGHT: 222.2 LBS | HEART RATE: 76 BPM | DIASTOLIC BLOOD PRESSURE: 76 MMHG | HEIGHT: 71 IN | BODY MASS INDEX: 31.11 KG/M2 | SYSTOLIC BLOOD PRESSURE: 124 MMHG

## 2019-07-11 DIAGNOSIS — E78.5 DYSLIPIDEMIA: ICD-10-CM

## 2019-07-11 DIAGNOSIS — E11.59 TYPE 2 DIABETES MELLITUS WITH OTHER CIRCULATORY COMPLICATION, UNSPECIFIED WHETHER LONG TERM INSULIN USE (HCC): ICD-10-CM

## 2019-07-11 DIAGNOSIS — I10 ESSENTIAL HYPERTENSION: ICD-10-CM

## 2019-07-11 DIAGNOSIS — I25.810 CORONARY ATHEROSCLEROSIS OF AUTOLOGOUS ARTERY BYPASS GRAFT WITHOUT ANGINA: Primary | ICD-10-CM

## 2019-07-11 DIAGNOSIS — Z95.1 S/P CABG X 4: ICD-10-CM

## 2019-07-11 PROCEDURE — G8598 ASA/ANTIPLAT THER USED: HCPCS | Performed by: INTERNAL MEDICINE

## 2019-07-11 PROCEDURE — 99214 OFFICE O/P EST MOD 30 MIN: CPT | Performed by: INTERNAL MEDICINE

## 2019-07-11 PROCEDURE — 4040F PNEUMOC VAC/ADMIN/RCVD: CPT | Performed by: INTERNAL MEDICINE

## 2019-07-11 PROCEDURE — G8417 CALC BMI ABV UP PARAM F/U: HCPCS | Performed by: INTERNAL MEDICINE

## 2019-07-11 PROCEDURE — 2022F DILAT RTA XM EVC RTNOPTHY: CPT | Performed by: INTERNAL MEDICINE

## 2019-07-11 PROCEDURE — 3045F PR MOST RECENT HEMOGLOBIN A1C LEVEL 7.0-9.0%: CPT | Performed by: INTERNAL MEDICINE

## 2019-07-11 PROCEDURE — 3017F COLORECTAL CA SCREEN DOC REV: CPT | Performed by: INTERNAL MEDICINE

## 2019-07-11 PROCEDURE — G8427 DOCREV CUR MEDS BY ELIG CLIN: HCPCS | Performed by: INTERNAL MEDICINE

## 2019-07-11 PROCEDURE — 1123F ACP DISCUSS/DSCN MKR DOCD: CPT | Performed by: INTERNAL MEDICINE

## 2019-07-11 PROCEDURE — 1036F TOBACCO NON-USER: CPT | Performed by: INTERNAL MEDICINE

## 2019-07-11 NOTE — PROGRESS NOTES
OFFICE FOLLOW UP NOTE  Chief Complaint: post CABG X4     HPI:   Maty Hoyos is a 68y.o. year old who has history as noted below. He is doing very well continues to be active , He is  working on lawn mowers and mowing lawn. He says breathing is better . HE had abnormal stress test in Jan 2019 , we opted to treat him medically . HE has no angina / sob every since he started imdur. He has no headaches  . He does think he gest tired real early . He gest winded when he bends over  He had stress test in Jan 2019 showing moderate inferior ischemia. HE says he stopped going to [employer] [place] due to warmer weather . No other issues since his last visit   He had  CABG X4 after presenting with unstable angina in Sept 2016 which led to cardiac cath on 9/2/16 showing multivessel disease . His initial presentation was shortness of breath .  Wife says he snores a lot       Current Outpatient Medications   Medication Sig Dispense Refill    metFORMIN (GLUCOPHAGE) 1000 MG tablet Take 1,000 mg by mouth 2 times daily (with meals)      isosorbide mononitrate (IMDUR) 30 MG extended release tablet Take 1 tablet by mouth daily 90 tablet 3    losartan (COZAAR) 50 MG tablet Take 1 tablet by mouth daily 90 tablet 3    carvedilol (COREG) 6.25 MG tablet Take 1 tablet by mouth 2 times daily 180 tablet 3    nitroGLYCERIN (NITROSTAT) 0.4 MG SL tablet Place 1 tablet under the tongue every 5 minutes as needed for Chest pain 25 tablet 3    Acetaminophen (TYLENOL PO) Take by mouth as needed Over The Counter      aspirin 81 MG EC tablet Take 81 mg by mouth every morning Over The Counter      rosuvastatin (CRESTOR) 10 MG tablet Take 10 mg by mouth every morning       sitaGLIPtin (JANUVIA) 100 MG tablet Take 100 mg by mouth every morning       canagliflozin (INVOKANA) 300 MG TABS tablet Take 300 mg by mouth every morning       insulin glargine (LANTUS SOLOSTAR) 100 UNIT/ML injection pen Inject 32 Units into the skin nightly

## 2019-12-05 ENCOUNTER — OFFICE VISIT (OUTPATIENT)
Dept: CARDIOLOGY CLINIC | Age: 74
End: 2019-12-05
Payer: MEDICARE

## 2019-12-05 VITALS
HEART RATE: 68 BPM | HEIGHT: 72 IN | DIASTOLIC BLOOD PRESSURE: 82 MMHG | BODY MASS INDEX: 29.8 KG/M2 | RESPIRATION RATE: 16 BRPM | SYSTOLIC BLOOD PRESSURE: 136 MMHG | WEIGHT: 220 LBS

## 2019-12-05 DIAGNOSIS — E78.5 DYSLIPIDEMIA: ICD-10-CM

## 2019-12-05 DIAGNOSIS — I25.810 CORONARY ATHEROSCLEROSIS OF AUTOLOGOUS ARTERY BYPASS GRAFT WITHOUT ANGINA: Primary | ICD-10-CM

## 2019-12-05 DIAGNOSIS — E11.59 TYPE 2 DIABETES MELLITUS WITH OTHER CIRCULATORY COMPLICATION, UNSPECIFIED WHETHER LONG TERM INSULIN USE (HCC): ICD-10-CM

## 2019-12-05 DIAGNOSIS — Z95.1 S/P CABG X 4: ICD-10-CM

## 2019-12-05 DIAGNOSIS — I10 ESSENTIAL HYPERTENSION: ICD-10-CM

## 2019-12-05 PROCEDURE — G8598 ASA/ANTIPLAT THER USED: HCPCS | Performed by: INTERNAL MEDICINE

## 2019-12-05 PROCEDURE — 1123F ACP DISCUSS/DSCN MKR DOCD: CPT | Performed by: INTERNAL MEDICINE

## 2019-12-05 PROCEDURE — G8427 DOCREV CUR MEDS BY ELIG CLIN: HCPCS | Performed by: INTERNAL MEDICINE

## 2019-12-05 PROCEDURE — G8417 CALC BMI ABV UP PARAM F/U: HCPCS | Performed by: INTERNAL MEDICINE

## 2019-12-05 PROCEDURE — 2022F DILAT RTA XM EVC RTNOPTHY: CPT | Performed by: INTERNAL MEDICINE

## 2019-12-05 PROCEDURE — 1036F TOBACCO NON-USER: CPT | Performed by: INTERNAL MEDICINE

## 2019-12-05 PROCEDURE — 3051F HG A1C>EQUAL 7.0%<8.0%: CPT | Performed by: INTERNAL MEDICINE

## 2019-12-05 PROCEDURE — 3017F COLORECTAL CA SCREEN DOC REV: CPT | Performed by: INTERNAL MEDICINE

## 2019-12-05 PROCEDURE — G8484 FLU IMMUNIZE NO ADMIN: HCPCS | Performed by: INTERNAL MEDICINE

## 2019-12-05 PROCEDURE — 4040F PNEUMOC VAC/ADMIN/RCVD: CPT | Performed by: INTERNAL MEDICINE

## 2019-12-05 PROCEDURE — 99214 OFFICE O/P EST MOD 30 MIN: CPT | Performed by: INTERNAL MEDICINE

## 2019-12-05 RX ORDER — EMPAGLIFLOZIN 25 MG/1
TABLET, FILM COATED ORAL
Refills: 3 | COMMUNITY
Start: 2019-11-08

## 2019-12-05 RX ORDER — LOSARTAN POTASSIUM 50 MG/1
50 TABLET ORAL DAILY
Qty: 90 TABLET | Refills: 3 | Status: SHIPPED | OUTPATIENT
Start: 2019-12-05 | End: 2021-01-18 | Stop reason: SDUPTHER

## 2019-12-05 RX ORDER — RANOLAZINE 500 MG/1
500 TABLET, EXTENDED RELEASE ORAL 2 TIMES DAILY
Qty: 60 TABLET | Refills: 3 | Status: SHIPPED | OUTPATIENT
Start: 2019-12-05 | End: 2020-03-26 | Stop reason: SDUPTHER

## 2019-12-05 RX ORDER — CARVEDILOL 6.25 MG/1
6.25 TABLET ORAL 2 TIMES DAILY
Qty: 180 TABLET | Refills: 3 | Status: SHIPPED | OUTPATIENT
Start: 2019-12-05 | End: 2021-01-18 | Stop reason: SDUPTHER

## 2019-12-05 RX ORDER — ISOSORBIDE MONONITRATE 30 MG/1
30 TABLET, EXTENDED RELEASE ORAL DAILY
Qty: 90 TABLET | Refills: 3 | Status: SHIPPED | OUTPATIENT
Start: 2019-12-05 | End: 2021-01-18 | Stop reason: SDUPTHER

## 2020-03-26 RX ORDER — RANOLAZINE 500 MG/1
500 TABLET, EXTENDED RELEASE ORAL 2 TIMES DAILY
Qty: 60 TABLET | Refills: 3 | Status: SHIPPED | OUTPATIENT
Start: 2020-03-26 | End: 2020-06-22 | Stop reason: SDUPTHER

## 2020-04-16 ENCOUNTER — OFFICE VISIT (OUTPATIENT)
Dept: CARDIOLOGY CLINIC | Age: 75
End: 2020-04-16
Payer: MEDICARE

## 2020-04-16 VITALS
RESPIRATION RATE: 16 BRPM | TEMPERATURE: 97.2 F | WEIGHT: 220 LBS | DIASTOLIC BLOOD PRESSURE: 74 MMHG | SYSTOLIC BLOOD PRESSURE: 116 MMHG | HEIGHT: 70 IN | BODY MASS INDEX: 31.5 KG/M2 | HEART RATE: 76 BPM

## 2020-04-16 PROCEDURE — 3046F HEMOGLOBIN A1C LEVEL >9.0%: CPT | Performed by: INTERNAL MEDICINE

## 2020-04-16 PROCEDURE — 99214 OFFICE O/P EST MOD 30 MIN: CPT | Performed by: INTERNAL MEDICINE

## 2020-04-16 PROCEDURE — 3017F COLORECTAL CA SCREEN DOC REV: CPT | Performed by: INTERNAL MEDICINE

## 2020-04-16 PROCEDURE — G8427 DOCREV CUR MEDS BY ELIG CLIN: HCPCS | Performed by: INTERNAL MEDICINE

## 2020-04-16 PROCEDURE — 4040F PNEUMOC VAC/ADMIN/RCVD: CPT | Performed by: INTERNAL MEDICINE

## 2020-04-16 PROCEDURE — G8417 CALC BMI ABV UP PARAM F/U: HCPCS | Performed by: INTERNAL MEDICINE

## 2020-04-16 PROCEDURE — 1036F TOBACCO NON-USER: CPT | Performed by: INTERNAL MEDICINE

## 2020-04-16 PROCEDURE — 2022F DILAT RTA XM EVC RTNOPTHY: CPT | Performed by: INTERNAL MEDICINE

## 2020-04-16 PROCEDURE — 1123F ACP DISCUSS/DSCN MKR DOCD: CPT | Performed by: INTERNAL MEDICINE

## 2020-04-16 RX ORDER — M-VIT,TX,IRON,MINS/CALC/FOLIC 27MG-0.4MG
1 TABLET ORAL DAILY
COMMUNITY

## 2020-04-16 NOTE — PROGRESS NOTES
tablet Take 1 tablet by mouth daily 90 tablet 3    isosorbide mononitrate (IMDUR) 30 MG extended release tablet Take 1 tablet by mouth daily 90 tablet 3    metFORMIN (GLUCOPHAGE) 1000 MG tablet Take 1,000 mg by mouth 2 times daily (with meals)      nitroGLYCERIN (NITROSTAT) 0.4 MG SL tablet Place 1 tablet under the tongue every 5 minutes as needed for Chest pain 25 tablet 3    Acetaminophen (TYLENOL PO) Take by mouth as needed Over The Counter      aspirin 81 MG EC tablet Take 81 mg by mouth every morning Over The Counter      rosuvastatin (CRESTOR) 10 MG tablet Take 10 mg by mouth every morning        No current facility-administered medications for this visit. Allergies:   Exenatide; Lisinopril; and Sulfa antibiotics    Patient History:  Past Medical History:   Diagnosis Date    Acid reflux     Anemia     Arthritis     \"Wrists, Knees, Ankles And Spine\"    Belching     \"All The Time\"    CAD (coronary artery disease)     Sees Dr. Liza Escobar Chronic back pain     [facility]    \"I Go To [facility] For [facility], I See Harrison Pham CNP\"    Difficult intubation     Diverticulitis Dx Early 1970's    Echocardiogram 01/22/2019    EF 78-80%, Grade 1 diastolic dysfunction, No signifiant valvular disease note. No pericardial effusion.  H/O echocardiogram 07/25/2016    The left ventricular size is normal. There is mild concentric LVH. Trace to mild MR     H/O exercise stress test 07/25/2016    Patient tolerated well without focal complaints. Heart rate response Appropriate. Normal findings.  H/O percutaneous left heart catheterization 09/02/2016    LAD 80%, Cx 80%, RCA 50% prox, PDA 90% mid    Hyperlipidemia     Hypertension     Lexiscan stress test 01/22/2019    EF 71%, Moderate size medium inferior ischemia.  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    Lima to LAD & D1, SVG PDA & Cx M1   

## 2020-06-22 RX ORDER — RANOLAZINE 500 MG/1
500 TABLET, EXTENDED RELEASE ORAL 2 TIMES DAILY
Qty: 180 TABLET | Refills: 3 | Status: SHIPPED | OUTPATIENT
Start: 2020-06-22 | End: 2021-06-21 | Stop reason: SDUPTHER

## 2020-07-09 ENCOUNTER — HOSPITAL ENCOUNTER (OUTPATIENT)
Age: 75
Discharge: HOME | End: 2020-07-09
Payer: MEDICARE

## 2020-07-09 LAB
ALBUMIN SERPL-MCNC: 4.4 GM/DL (ref 3.4–5)
ALP BLD-CCNC: 48 IU/L (ref 40–128)
ALT SERPL-CCNC: 21 U/L (ref 10–40)
ANION GAP SERPL CALCULATED.3IONS-SCNC: 14 MMOL/L (ref 4–16)
AST SERPL-CCNC: 21 IU/L (ref 15–37)
BILIRUB SERPL-MCNC: 0.7 MG/DL (ref 0–1)
BUN BLDV-MCNC: 18 MG/DL (ref 6–23)
CALCIUM SERPL-MCNC: 9.3 MG/DL (ref 8.3–10.6)
CHLORIDE BLD-SCNC: 104 MMOL/L (ref 99–110)
CHOLESTEROL, FASTING: 154 MG/DL
CO2: 20 MMOL/L (ref 21–32)
CREAT SERPL-MCNC: 1.3 MG/DL (ref 0.9–1.3)
ESTIMATED AVERAGE GLUCOSE: 131 MG/DL
GFR AFRICAN AMERICAN: >60 ML/MIN/1.73M2
GFR NON-AFRICAN AMERICAN: 54 ML/MIN/1.73M2
GLUCOSE FASTING: 132 MG/DL (ref 70–99)
HBA1C MFR BLD: 6.2 % (ref 4.2–6.3)
HDLC SERPL-MCNC: 35 MG/DL
LDL CHOLESTEROL DIRECT: 90 MG/DL
POTASSIUM SERPL-SCNC: 4.6 MMOL/L (ref 3.5–5.1)
SODIUM BLD-SCNC: 138 MMOL/L (ref 135–145)
TOTAL PROTEIN: 7.4 GM/DL (ref 6.4–8.2)
TRIGLYCERIDE, FASTING: 175 MG/DL
TSH HIGH SENSITIVITY: 2.72 UIU/ML (ref 0.27–4.2)

## 2020-07-09 PROCEDURE — 36415 COLL VENOUS BLD VENIPUNCTURE: CPT

## 2020-07-09 PROCEDURE — 80053 COMPREHEN METABOLIC PANEL: CPT

## 2020-07-09 PROCEDURE — 83036 HEMOGLOBIN GLYCOSYLATED A1C: CPT

## 2020-07-09 PROCEDURE — 84443 ASSAY THYROID STIM HORMONE: CPT

## 2020-07-09 PROCEDURE — 80061 LIPID PANEL: CPT

## 2020-10-22 ENCOUNTER — OFFICE VISIT (OUTPATIENT)
Dept: CARDIOLOGY CLINIC | Age: 75
End: 2020-10-22
Payer: MEDICARE

## 2020-10-22 VITALS
RESPIRATION RATE: 16 BRPM | DIASTOLIC BLOOD PRESSURE: 82 MMHG | WEIGHT: 220 LBS | TEMPERATURE: 97.1 F | SYSTOLIC BLOOD PRESSURE: 118 MMHG | BODY MASS INDEX: 30.8 KG/M2 | HEART RATE: 72 BPM | HEIGHT: 71 IN

## 2020-10-22 PROCEDURE — 1123F ACP DISCUSS/DSCN MKR DOCD: CPT | Performed by: INTERNAL MEDICINE

## 2020-10-22 PROCEDURE — 4040F PNEUMOC VAC/ADMIN/RCVD: CPT | Performed by: INTERNAL MEDICINE

## 2020-10-22 PROCEDURE — 2022F DILAT RTA XM EVC RTNOPTHY: CPT | Performed by: INTERNAL MEDICINE

## 2020-10-22 PROCEDURE — 99214 OFFICE O/P EST MOD 30 MIN: CPT | Performed by: INTERNAL MEDICINE

## 2020-10-22 PROCEDURE — G8484 FLU IMMUNIZE NO ADMIN: HCPCS | Performed by: INTERNAL MEDICINE

## 2020-10-22 PROCEDURE — 1036F TOBACCO NON-USER: CPT | Performed by: INTERNAL MEDICINE

## 2020-10-22 PROCEDURE — G8417 CALC BMI ABV UP PARAM F/U: HCPCS | Performed by: INTERNAL MEDICINE

## 2020-10-22 PROCEDURE — 3044F HG A1C LEVEL LT 7.0%: CPT | Performed by: INTERNAL MEDICINE

## 2020-10-22 PROCEDURE — G8427 DOCREV CUR MEDS BY ELIG CLIN: HCPCS | Performed by: INTERNAL MEDICINE

## 2020-10-22 PROCEDURE — 3017F COLORECTAL CA SCREEN DOC REV: CPT | Performed by: INTERNAL MEDICINE

## 2020-10-22 RX ORDER — ROSUVASTATIN CALCIUM 20 MG/1
10 TABLET, COATED ORAL EVERY MORNING
Qty: 60 TABLET | Refills: 2 | Status: SHIPPED | OUTPATIENT
Start: 2020-10-22 | End: 2020-11-05 | Stop reason: SDUPTHER

## 2020-10-22 NOTE — PROGRESS NOTES
OFFICE FOLLOW UP NOTE  Chief Complaint: ASCVD ,post CABG X4     HPI:   Charis Hendricks is a 77y.o. year old who has history as noted below. Charis Hendricks is very happy today he tells me that he just one a  in [place] . he has about 20 lawnmowers in his backyard which he works on   Charis Hendricks reports improved exercise capacity and no chest pain however since he started Ranexa. He did have inferior wall ischemia on the stress test but we were trying medical therapy. Before his bypass surgery his main symptoms are shortness of breath never had chest pain. He denies any chest pain or shortness of breath now  He is being very careful due to Covid crisis  he gets short of breath on bending over or when he is walking up the garage door etc.    He had abnormal stress test in Jan 2019 , we opted to treat him medically . HE has no angina /but has sob . He thinks Imdur and Ranexa has improved in his functional capacity he has no headaches  . He does think he gest tired real early . Used to actively go to [facility] but it persists Covid crisis he is not going to [facility] he tried walking regularly but resulted in the foot fracture therefore is not walking much now. He had  CABG X4 after presenting with unstable angina in Sept 2016 which led to cardiac cath on 9/2/16 showing multivessel disease . His initial presentation was shortness of breath .  Wife says he snores a lot  HE did not want sleep apnea RX , He used to sleep in recliner but now in bed      Current Outpatient Medications   Medication Sig Dispense Refill    rosuvastatin (CRESTOR) 20 MG tablet Take 0.5 tablets by mouth every morning 60 tablet 2    ranolazine (RANEXA) 500 MG extended release tablet Take 1 tablet by mouth 2 times daily 180 tablet 3    insulin glargine (BASAGLAR KWIKPEN) 100 UNIT/ML injection pen Inject 41 Units into the skin nightly      JARDIANCE 25 MG tablet TAKE 1 TABLET BY MOUTH EVERY DAY  3    SITagliptin (JANUVIA) 100 MG medium inferior ischemia. Abn stress    Mononucleosis Dx     Pleurisy Dx     Ringing in the ears     S/P CABG x 4 2016    Lima to LAD & D1, SVG PDA & Cx M1    Shortness of breath on exertion     Wears glasses      Past Surgical History:   Procedure Laterality Date    CARDIAC CATHETERIZATION  2016    CARDIAC SURGERY  2016    CABG x 4 Lima to the LAD and D1, SVG to PDA, SVG to CX M1    COLONOSCOPY  Early     ENDOSCOPY, COLON, DIAGNOSTIC  Late     LEG SURGERY Right     Gangrene Right Lower Leg    OTHER SURGICAL HISTORY Bilateral Last Done In     \"Multiple Ear Surgeries To Patch Ear Drum\"    WRIST SURGERY Right  Or     \"Lump Removed Top Of Right Wrist, Pinched Ulnar Nerve Also Done\"     Family History   Problem Relation Age of Onset    Heart Disease Mother         Open Heart Surgery    Vision Loss Mother     Hearing Loss Mother     Cancer Father         Prostate Cancer    Heart Disease Father         Open Heart Surgery    Heart Disease Brother     Arthritis Brother     Diabetes Son    Mora Aguiar Other Son         \"2 Surgeries On Pancreas\"     Social History     Tobacco Use    Smoking status: Former Smoker     Packs/day: 1.50     Years: 6.00     Pack years: 9.00     Types: Cigarettes     Start date:      Last attempt to quit: 1970     Years since quittin.8    Smokeless tobacco: Never Used   Substance Use Topics    Alcohol use: Yes     Comment: \"Very Seldom, Maybe Once Every Month Or More\"        Review of Systems:   · Constitutional: No Fever or Weight Loss   · Eyes: No Decreased Vision  · ENT: No Headaches, Hearing Loss or Vertigo  · Cardiovascular: as per note above   · Respiratory: No cough or wheezing and as per note above.    · Gastrointestinal: No abdominal pain, appetite loss, blood in stools, constipation, diarrhea or heartburn  · Genitourinary: No dysuria, trouble voiding, or hematuria  · Musculoskeletal:  None  · Integumentary: No rash or pruritis  · Neurological: No TIA or stroke symptoms  · Psychiatric: No anxiety or depression  · Endocrine: No malaise, fatigue or temperature intolerance  · Hematologic/Lymphatic: No bleeding problems, blood clots or swollen lymph nodes  · Allergic/Immunologic: No nasal congestion or hives    Objective:      Physical Exam:  /82   Pulse 72   Temp 97.1 °F (36.2 °C)   Resp 16   Ht 5' 11\" (1.803 m)   Wt 220 lb (99.8 kg)   BMI 30.68 kg/m²   Wt Readings from Last 3 Encounters:   10/22/20 220 lb (99.8 kg)   04/16/20 220 lb (99.8 kg)   12/05/19 220 lb (99.8 kg)     Body mass index is 30.68 kg/m². GENERAL - Alert, oriented, pleasant, in no apparent distress. Head unremarkable  Eyes -  Not injected conjunctiva  ENT - Normal mucosa  Neck - Supple. No jugular venous distention noted. No carotid bruits. Cardiovascular - Normal S1 and S2 without obvious murmur or gallop. No signs of volume over load. Rate and rhythm is regular, surgical scar healing well. Sternal wound is healed. Stable   Extremities - No cyanosis, clubbing, or significant edema. Pulmonary - No respiratory distress. No wheezes or rales. Pulses - Bilateral radial and pedal pulses normal  Abdomen - No tenderness  Musculoskeletal - Normal strength  Neurologic - There are no gross focal neurologic abnormalities.   Skin - No rash  Affect - Normal mood      Lab Results   Component Value Date    LABA1C 6.2 07/09/2020    LABA1C 7.3 (H) 06/19/2019     Lab Results   Component Value Date    CHOL 137 03/15/2016    TRIG 180 (H) 03/15/2016    HDL 35 (L) 07/09/2020    LDLDIRECT 90 07/09/2020     Lab Results   Component Value Date    ALT 21 07/09/2020    AST 21 07/09/2020     Cath 9/2/16  Coronary anatomy:   The left main coronary artery has no significant disease.    Left anterior descending artery has 80% mid segment stenosis   Circumflex artery has 80% proximal segment stenosis   The right coronary artery is a dominant vessel 50% proximal and 90% mid PDA stenosis   echo 7/216  normal EF, no significant valve diseae    Assessment & Plan:     1. Dyslipidemia    2. Essential hypertension    3. S/P CABG x 4    4. Type 2 diabetes mellitus with other circulatory complication, unspecified whether long term insulin use ([address]    5. 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 S/P CABG x 4  CABG X 4 SVG to  marginal,LIMA to lad and D1,SVG to PDA,SVG to Cx M1In Sept 2016. He  has abnormal inferior ischemia but currently angina class II stable on  imdur 30 mg, Coreg and Ranexa. , if he has more symptoms we will plan cardiac cath . Continue coreg ,    I have asked him to call me if his symptoms . Use nitro as needed     Essential hypertension  Bp at home is well controlled. He does have sleep  Apnea  But he does not want to wear cpap     Type 2 diabetes mellitus with circulatory disorder ([facility]  Very good control, Log shows fasting sugars in 140's last Hba 1c was 7.3 in 2019. Goes to [facility] endocrinology, continue crestor 10mg daily and jardinace      Dyslipidemia :  He had lab work recently,  Lipid profile was reviewed with patient. He will have repeat lab work with dr Billy Knight in few weeks. Advised to bring me the results     Return in about 6 months (around 4/22/2021).

## 2020-11-05 RX ORDER — ROSUVASTATIN CALCIUM 10 MG/1
10 TABLET, COATED ORAL EVERY MORNING
Qty: 90 TABLET | Refills: 3 | Status: SHIPPED | OUTPATIENT
Start: 2020-11-05 | End: 2021-04-22

## 2020-11-23 LAB
ALBUMIN SERPL-MCNC: 3.6 G/DL
ALP BLD-CCNC: 49 U/L
ALT SERPL-CCNC: 26 U/L
ANION GAP SERPL CALCULATED.3IONS-SCNC: NORMAL MMOL/L
AST SERPL-CCNC: 15 U/L
BASOPHILS ABSOLUTE: NORMAL
BASOPHILS RELATIVE PERCENT: NORMAL
BILIRUB SERPL-MCNC: 0.7 MG/DL (ref 0.1–1.4)
BUN BLDV-MCNC: 17 MG/DL
CALCIUM SERPL-MCNC: NORMAL MG/DL
CHLORIDE BLD-SCNC: 105 MMOL/L
CHOLESTEROL, TOTAL: 152 MG/DL
CHOLESTEROL/HDL RATIO: NORMAL
CO2: NORMAL
CREAT SERPL-MCNC: 1.3 MG/DL
EOSINOPHILS ABSOLUTE: NORMAL
EOSINOPHILS RELATIVE PERCENT: NORMAL
GFR CALCULATED: NORMAL
GLUCOSE BLD-MCNC: 121 MG/DL
HCT VFR BLD CALC: 47 % (ref 41–53)
HDLC SERPL-MCNC: 43 MG/DL (ref 35–70)
HEMOGLOBIN: 16.3 G/DL (ref 13.5–17.5)
LDL CHOLESTEROL CALCULATED: 77 MG/DL (ref 0–160)
LYMPHOCYTES ABSOLUTE: NORMAL
LYMPHOCYTES RELATIVE PERCENT: NORMAL
MCH RBC QN AUTO: NORMAL PG
MCHC RBC AUTO-ENTMCNC: NORMAL G/DL
MCV RBC AUTO: NORMAL FL
MONOCYTES ABSOLUTE: NORMAL
MONOCYTES RELATIVE PERCENT: NORMAL
NEUTROPHILS ABSOLUTE: NORMAL
NEUTROPHILS RELATIVE PERCENT: NORMAL
NONHDLC SERPL-MCNC: NORMAL MG/DL
PLATELET # BLD: 198 K/ΜL
PMV BLD AUTO: NORMAL FL
POTASSIUM SERPL-SCNC: 4.1 MMOL/L
RBC # BLD: 5.16 10^6/ΜL
SODIUM BLD-SCNC: 137 MMOL/L
TOTAL PROTEIN: NORMAL
TRIGL SERPL-MCNC: 160 MG/DL
VLDLC SERPL CALC-MCNC: NORMAL MG/DL
WBC # BLD: 7.6 10^3/ML

## 2021-01-18 RX ORDER — LOSARTAN POTASSIUM 50 MG/1
50 TABLET ORAL DAILY
Qty: 90 TABLET | Refills: 3 | Status: SHIPPED | OUTPATIENT
Start: 2021-01-18 | End: 2022-01-10

## 2021-01-18 RX ORDER — CARVEDILOL 6.25 MG/1
6.25 TABLET ORAL 2 TIMES DAILY
Qty: 180 TABLET | Refills: 3 | Status: SHIPPED | OUTPATIENT
Start: 2021-01-18 | End: 2022-01-10

## 2021-01-18 RX ORDER — ISOSORBIDE MONONITRATE 30 MG/1
30 TABLET, EXTENDED RELEASE ORAL DAILY
Qty: 90 TABLET | Refills: 3 | Status: SHIPPED | OUTPATIENT
Start: 2021-01-18 | End: 2022-01-10

## 2021-04-22 ENCOUNTER — OFFICE VISIT (OUTPATIENT)
Dept: CARDIOLOGY CLINIC | Age: 76
End: 2021-04-22
Payer: MEDICARE

## 2021-04-22 VITALS
SYSTOLIC BLOOD PRESSURE: 124 MMHG | WEIGHT: 215.6 LBS | RESPIRATION RATE: 18 BRPM | BODY MASS INDEX: 30.07 KG/M2 | DIASTOLIC BLOOD PRESSURE: 78 MMHG | HEART RATE: 72 BPM

## 2021-04-22 DIAGNOSIS — E78.5 DYSLIPIDEMIA: ICD-10-CM

## 2021-04-22 DIAGNOSIS — E11.59 TYPE 2 DIABETES MELLITUS WITH OTHER CIRCULATORY COMPLICATION, UNSPECIFIED WHETHER LONG TERM INSULIN USE (HCC): ICD-10-CM

## 2021-04-22 DIAGNOSIS — Z95.1 S/P CABG X 4: Primary | ICD-10-CM

## 2021-04-22 DIAGNOSIS — I10 ESSENTIAL HYPERTENSION: ICD-10-CM

## 2021-04-22 DIAGNOSIS — I25.810 CORONARY ATHEROSCLEROSIS OF AUTOLOGOUS ARTERY BYPASS GRAFT WITHOUT ANGINA: ICD-10-CM

## 2021-04-22 PROCEDURE — 2022F DILAT RTA XM EVC RTNOPTHY: CPT | Performed by: INTERNAL MEDICINE

## 2021-04-22 PROCEDURE — 4040F PNEUMOC VAC/ADMIN/RCVD: CPT | Performed by: INTERNAL MEDICINE

## 2021-04-22 PROCEDURE — G8427 DOCREV CUR MEDS BY ELIG CLIN: HCPCS | Performed by: INTERNAL MEDICINE

## 2021-04-22 PROCEDURE — G8417 CALC BMI ABV UP PARAM F/U: HCPCS | Performed by: INTERNAL MEDICINE

## 2021-04-22 PROCEDURE — 1123F ACP DISCUSS/DSCN MKR DOCD: CPT | Performed by: INTERNAL MEDICINE

## 2021-04-22 PROCEDURE — 1036F TOBACCO NON-USER: CPT | Performed by: INTERNAL MEDICINE

## 2021-04-22 PROCEDURE — 3046F HEMOGLOBIN A1C LEVEL >9.0%: CPT | Performed by: INTERNAL MEDICINE

## 2021-04-22 PROCEDURE — 3017F COLORECTAL CA SCREEN DOC REV: CPT | Performed by: INTERNAL MEDICINE

## 2021-04-22 PROCEDURE — 99214 OFFICE O/P EST MOD 30 MIN: CPT | Performed by: INTERNAL MEDICINE

## 2021-04-22 RX ORDER — ROSUVASTATIN CALCIUM 20 MG/1
20 TABLET, COATED ORAL EVERY MORNING
Qty: 90 TABLET | Refills: 3 | Status: SHIPPED | OUTPATIENT
Start: 2021-04-22 | End: 2022-04-19

## 2021-04-22 NOTE — PROGRESS NOTES
tablet Take 1 tablet by mouth 2 times daily 180 tablet 3    Multiple Vitamins-Minerals (THERAPEUTIC MULTIVITAMIN-MINERALS) tablet Take 1 tablet by mouth daily      insulin glargine (BASAGLAR KWIKPEN) 100 UNIT/ML injection pen Inject 41 Units into the skin nightly      JARDIANCE 25 MG tablet TAKE 1 TABLET BY MOUTH EVERY DAY  3    SITagliptin (JANUVIA) 100 MG tablet Take 100 mg by mouth daily      metFORMIN (GLUCOPHAGE) 1000 MG tablet Take 1,000 mg by mouth 2 times daily (with meals)      nitroGLYCERIN (NITROSTAT) 0.4 MG SL tablet Place 1 tablet under the tongue every 5 minutes as needed for Chest pain 25 tablet 3    Acetaminophen (TYLENOL PO) Take by mouth as needed Over The Counter      aspirin 81 MG EC tablet Take 81 mg by mouth every morning Over The Counter       No current facility-administered medications for this visit. Allergies:   Exenatide, Lisinopril, and Sulfa antibiotics    Patient History:  Past Medical History:   Diagnosis Date    Acid reflux     Anemia     Arthritis     \"Wrists, Knees, Ankles And Spine\"    Belching     \"All The Time\"    CAD (coronary artery disease)     Sees Dr. Quincy Tolentino Chronic back pain     [facility] Dx 1's    \"I Go To [facility] For [facility]abetic [facility], I See [place] Hash CNP\"    Difficult intubation     Diverticulitis Dx Early 1970's    Echocardiogram 01/22/2019    EF 71-21%, Grade 1 diastolic dysfunction, No signifiant valvular disease note. No pericardial effusion.  H/O echocardiogram 07/25/2016    The left ventricular size is normal. There is mild concentric LVH. Trace to mild MR     H/O exercise stress test 07/25/2016    Patient tolerated well without focal complaints. Heart rate response Appropriate. Normal findings.      H/O percutaneous left heart catheterization 09/02/2016    LAD 80%, Cx 80%, RCA 50% prox, PDA 90% mid    Hyperlipidemia     Hypertension     Lexiscan stress test 01/22/2019    EF 71%, Moderate size medium inferior ischemia. Abn stress    Mononucleosis Dx 1964    Pleurisy Dx 1964    Ringing in the ears     S/P CABG x 4 2016    [place] to LAD & D1, SVG PDA & Cx M1    Shortness of breath on exertion     Wears glasses      Past Surgical History:   Procedure Laterality Date    CARDIAC CATHETERIZATION  2016    CARDIAC SURGERY  2016    CABG x 4 [place] to the LAD and D1, SVG to PDA, SVG to CX M1    COLONOSCOPY  Early     ENDOSCOPY, COLON, DIAGNOSTIC  Late     LEG SURGERY Right     Gangrene Right Lower Leg    OTHER SURGICAL HISTORY Bilateral Last Done In     \"Multiple Ear Surgeries To Patch Ear Drum\"    WRIST SURGERY Right  Or     \"Lump Removed Top Of Right Wrist, Pinched Ulnar Nerve Also Done\"     Family History   Problem Relation Age of Onset    Heart Disease Mother         Open Heart Surgery    Vision Loss Mother     Hearing Loss Mother     Cancer Father         Prostate Cancer    Heart Disease Father         Open Heart Surgery    Heart Disease Brother     Arthritis Brother     Diabetes Son     Other Son         \"2 Surgeries On Pancreas\"     Social History     Tobacco Use    Smoking status: Former Smoker     Packs/day: 1.50     Years: 6.00     Pack years: 9.00     Types: Cigarettes     Start date:      Quit date: 1970     Years since quittin.3    Smokeless tobacco: Never Used   Substance Use Topics    Alcohol use: Yes     Comment: \"Very Seldom, Maybe Once Every Month Or More\"        Review of Systems:   · Constitutional: No Fever or Weight Loss   · Eyes: No Decreased Vision  · ENT: No Headaches, Hearing Loss or Vertigo  · Cardiovascular: as per note above   · Respiratory: No cough or wheezing and as per note above.    · Gastrointestinal: No abdominal pain, appetite loss, blood in stools, constipation, diarrhea or heartburn  · Genitourinary: No dysuria, trouble voiding, or hematuria  · Musculoskeletal:  None  · Integumentary: No rash or pruritis  · Neurological: No TIA or stroke symptoms  · Psychiatric: No anxiety or depression  · Endocrine: No malaise, fatigue or temperature intolerance  · Hematologic/Lymphatic: No bleeding problems, blood clots or swollen lymph nodes  · Allergic/Immunologic: No nasal congestion or hives    Objective:      Physical Exam:  /78   Pulse 72   Resp 18   Wt 215 lb 9.6 oz (97.8 kg)   BMI 30.07 kg/m²   Wt Readings from Last 3 Encounters:   04/22/21 215 lb 9.6 oz (97.8 kg)   10/22/20 220 lb (99.8 kg)   04/16/20 220 lb (99.8 kg)     Body mass index is 30.07 kg/m². GENERAL - Alert, oriented, pleasant, in no apparent distress. Head unremarkable  Eyes -  Not injected conjunctiva  ENT - Normal mucosa  Neck - Supple. No jugular venous distention noted. No carotid bruits. Cardiovascular - Normal S1 and S2 without obvious murmur or gallop. No signs of volume over load. Rate and rhythm is regular, surgical scar healing well. Sternal wound is healed. Stable   Extremities - No cyanosis, clubbing, or significant edema. Pulmonary - No respiratory distress. No wheezes or rales. Pulses - Bilateral radial and pedal pulses normal  Abdomen - No tenderness  Musculoskeletal - Normal strength  Neurologic - There are no gross focal neurologic abnormalities.   Skin - No rash  Affect - Normal mood      Lab Results   Component Value Date    LABA1C 6.2 07/09/2020    LABA1C 7.3 (H) 06/19/2019     Lab Results   Component Value Date    CHOL 152 11/23/2020    TRIG 160 11/23/2020    HDL 43 11/23/2020    LDLCALC 77 11/23/2020    LDLDIRECT 90 07/09/2020     Lab Results   Component Value Date    ALT 26 11/23/2020    AST 15 11/23/2020     Cath 9/2/16  Coronary anatomy:   The left main coronary artery has no significant disease.    Left anterior descending artery has 80% mid segment stenosis   Circumflex artery has 80% proximal segment stenosis   The right coronary artery is a dominant vessel 50% proximal and 90% mid PDA stenosis   echo 7/216  normal EF, no significant valve diseae    Assessment & Plan:     1. S/P CABG x 4    2. Essential hypertension    3. Type 2 diabetes mellitus with other circulatory complication, unspecified whether long term insulin use ([address]    4. Dyslipidemia    5. 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 S/P CABG x 4  CABG X 4 SVG to  marginal,LIMA to lad and D1,SVG to PDA,SVG to Cx M1In Sept 2016. He  has abnormal inferior ischemia but currently angina class II stable on  imdur 30 mg, Coreg and Ranexa. , if he has more symptoms we will plan cardiac cath . I have asked him to call me if his symptoms . He has not needed to used nitro    Essential hypertension  Bp at home is well controlled. He does have sleep  Apnea  But he does not want to wear cpap     Type 2 diabetes mellitus with circulatory disorder ([facility]  Very good control, Log shows fasting sugars in 140's last Hba 1c was 7.3 in 2019. Goes to [address] [place] [address] endocrinology, continue  cornelio for cardiovascular risk factor reduction     Dyslipidemia :  He had lab work recently,  Lipid profile was reviewed with patient. He will have repeat lab work with dr Keyana Beasley in few weeks. Advised to bring me the results  His LDL is in 70s will increase Crestor to 20 mg daily if he tolerates it     Return in about 6 months (around 10/22/2021).

## 2021-06-21 RX ORDER — RANOLAZINE 500 MG/1
500 TABLET, EXTENDED RELEASE ORAL 2 TIMES DAILY
Qty: 180 TABLET | Refills: 3 | Status: SHIPPED | OUTPATIENT
Start: 2021-06-21 | End: 2022-06-22

## 2021-09-03 ENCOUNTER — HOSPITAL ENCOUNTER (OUTPATIENT)
Age: 76
Discharge: HOME | End: 2021-09-03
Payer: MEDICARE

## 2021-09-03 LAB
ALBUMIN SERPL-MCNC: 3.9 GM/DL (ref 3.4–5)
ALP BLD-CCNC: 49 IU/L (ref 40–129)
ALT SERPL-CCNC: 20 U/L (ref 10–40)
ANION GAP SERPL CALCULATED.3IONS-SCNC: 14 MMOL/L (ref 4–16)
AST SERPL-CCNC: 25 IU/L (ref 15–37)
BILIRUB SERPL-MCNC: 0.4 MG/DL (ref 0–1)
BUN BLDV-MCNC: 12 MG/DL (ref 6–23)
CALCIUM SERPL-MCNC: 9.2 MG/DL (ref 8.3–10.6)
CHLORIDE BLD-SCNC: 103 MMOL/L (ref 99–110)
CHOLESTEROL, FASTING: 136 MG/DL
CO2: 22 MMOL/L (ref 21–32)
CREAT SERPL-MCNC: 1.2 MG/DL (ref 0.9–1.3)
CREATININE URINE: 59.7 MG/DL (ref 39–259)
ESTIMATED AVERAGE GLUCOSE: 137 MG/DL
GFR AFRICAN AMERICAN: >60 ML/MIN/1.73M2
GFR NON-AFRICAN AMERICAN: 59 ML/MIN/1.73M2
GLUCOSE FASTING: 135 MG/DL (ref 70–99)
HBA1C MFR BLD: 6.4 % (ref 4.2–6.3)
HDLC SERPL-MCNC: 33 MG/DL
LDL CHOLESTEROL DIRECT: 67 MG/DL
MICROALBUMIN/CREAT 24H UR: <1.2 MG/DL
MICROALBUMIN/CREAT UR-RTO: NORMAL MG/G CREAT (ref 0–30)
POTASSIUM SERPL-SCNC: 4.2 MMOL/L (ref 3.5–5.1)
PROSTATE SPECIFIC ANTIGEN: 3.15 NG/ML (ref 0–4)
SODIUM BLD-SCNC: 139 MMOL/L (ref 135–145)
TOTAL PROTEIN: 7.2 GM/DL (ref 6.4–8.2)
TRIGLYCERIDE, FASTING: 226 MG/DL

## 2021-09-03 PROCEDURE — 36415 COLL VENOUS BLD VENIPUNCTURE: CPT

## 2021-09-03 PROCEDURE — 82570 ASSAY OF URINE CREATININE: CPT

## 2021-09-03 PROCEDURE — 82043 UR ALBUMIN QUANTITATIVE: CPT

## 2021-09-03 PROCEDURE — 80061 LIPID PANEL: CPT

## 2021-09-03 PROCEDURE — 83036 HEMOGLOBIN GLYCOSYLATED A1C: CPT

## 2021-09-03 PROCEDURE — G0103 PSA SCREENING: HCPCS

## 2021-09-03 PROCEDURE — 80053 COMPREHEN METABOLIC PANEL: CPT

## 2021-10-20 NOTE — ASSESSMENT & PLAN NOTE
-CABG x 4 in 2016, abnormal stress test in 2019 and is being managed medically. Continue with losartan, aspirin, Ranexa, Imdur, lovastatin, Coreg.

## 2021-10-20 NOTE — PROGRESS NOTES
[facility] [address], [address]  Phone: [phone]    Fax [phone]                  Marli Barker MD, Dom Morillo MD, [address], MD, MD Gaurav Winn MD, Christy Goodson MD, Reinaldo Ma MD, [address] [facility], [facility]       [facility]        Cardiology Progress Note      10/21/2021    RE: Hubbard Homans  ([id number])                             Primary cardiologist: Dr. Gaurav Strong       Subjective:  CC:   1. 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 Dyslipidemia    3. Essential hypertension        HPI: Hubbard Homans, who is a  77y.o. year old male with a past medical history as listed below. Patient presents to the office for follow up on CAD (CABG x 4 in 2016), HTN, and hyperlipidemia. Patient is  an active male who walks regularly. Patient is  compliant with medications. Patient denies any chest pain, shortness of breath, dizziness, syncope, or palpitations. Past Medical History:   Diagnosis Date    Acid reflux     Anemia     Arthritis     \"Wrists, Knees, Ankles And Spine\"    Belching     \"All The Time\"    CAD (coronary artery disease)     Sees Dr. Ghislaine Blake Chronic back pain     [facility]    \"I Go To [facility] For [facility], I See [place] Alford CNP\"    Difficult intubation     Diverticulitis Dx Early 1970's    Echocardiogram 01/22/2019    EF 23-03%, Grade 1 diastolic dysfunction, No signifiant valvular disease note. No pericardial effusion.  H/O echocardiogram 07/25/2016    The left ventricular size is normal. There is mild concentric LVH. Trace to mild MR     H/O exercise stress test 07/25/2016    Patient tolerated well without focal complaints. Heart rate response Appropriate. Normal findings.      H/O percutaneous left heart catheterization 09/02/2016    LAD 80%, Cx 80%, RCA 50% prox, PDA 90% mid    Hyperlipidemia     Hypertension     Lexiscan stress test 01/22/2019    EF 71%, Moderate size medium inferior ischemia. 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    [place] to LAD & D1, SVG PDA & Cx M1    Shortness of breath on exertion     Wears glasses        Current Outpatient Medications   Medication Sig Dispense Refill    ranolazine (RANEXA) 500 MG extended release tablet Take 1 tablet by mouth 2 times daily 180 tablet 3    rosuvastatin (CRESTOR) 20 MG tablet Take 1 tablet by mouth every morning 90 tablet 3    carvedilol (COREG) 6.25 MG tablet Take 1 tablet by mouth 2 times daily 180 tablet 3    losartan (COZAAR) 50 MG tablet Take 1 tablet by mouth daily 90 tablet 3    isosorbide mononitrate (IMDUR) 30 MG extended release tablet Take 1 tablet by mouth daily 90 tablet 3    Multiple Vitamins-Minerals (THERAPEUTIC MULTIVITAMIN-MINERALS) tablet Take 1 tablet by mouth daily      insulin glargine (BASAGLAR KWIKPEN) 100 UNIT/ML injection pen Inject 41 Units into the skin nightly      JARDIANCE 25 MG tablet TAKE 1 TABLET BY MOUTH EVERY DAY  3    SITagliptin (JANUVIA) 100 MG tablet Take 100 mg by mouth daily      metFORMIN (GLUCOPHAGE) 1000 MG tablet Take 1,000 mg by mouth 2 times daily (with meals)      nitroGLYCERIN (NITROSTAT) 0.4 MG SL tablet Place 1 tablet under the tongue every 5 minutes as needed for Chest pain 25 tablet 3    Acetaminophen (TYLENOL PO) Take by mouth as needed Over The Counter      aspirin 81 MG EC tablet Take 81 mg by mouth every morning Over The Counter       No current facility-administered medications for this visit. Review of Systems:  Review of Systems   Cardiovascular: Negative for chest pain, palpitations and leg swelling. Musculoskeletal: Negative. Skin: Negative. Neurological: Positive for headaches.  Negative for dizziness and weakness. All other systems reviewed and are negative. Objective:      Physical Exam:  /78   Pulse 76   Ht 5' 10\" (1.778 m)   Wt 219 lb (99.3 kg)   BMI 31.42 kg/m²   Wt Readings from Last 3 Encounters:   10/21/21 219 lb (99.3 kg)   04/22/21 215 lb 9.6 oz (97.8 kg)   10/22/20 220 lb (99.8 kg)     Body mass index is 31.42 kg/m². Physical exam:  Physical Exam  Constitutional:       Appearance: He is well-developed. Cardiovascular:      Rate and Rhythm: Normal rate and regular rhythm. Pulses: Intact distal pulses. Dorsalis pedis pulses are 2+ on the right side and 2+ on the left side. Posterior tibial pulses are 2+ on the right side and 2+ on the left side. Heart sounds: Normal heart sounds, S1 normal and S2 normal.   Pulmonary:      Effort: Pulmonary effort is normal.      Breath sounds: Normal breath sounds. Musculoskeletal:         General: Normal range of motion. Skin:     General: Skin is warm and dry. Neurological:      Mental Status: He is alert and oriented to person, place, and time. DATA:  No results found for: CKTOTAL, CKMB, CKMBINDEX, TROPONINI  BNP:  No results found for: BNP  PT/INR:  No results found for: PTINR  Lab Results   Component Value Date    LABA1C 6.4 (H) 09/03/2021    LABA1C 6.2 07/09/2020     Lab Results   Component Value Date    CHOL 152 11/23/2020    TRIG 160 11/23/2020    HDL 33 (L) 09/03/2021    LDLCALC 77 11/23/2020    LDLDIRECT 67 09/03/2021     Lab Results   Component Value Date    ALT 20 09/03/2021    AST 25 09/03/2021     TSH:  No results found for: TSH    Vitals:    10/21/21 1032   BP: 122/78   Pulse: 76       Echo:1/22/19  Left ventricular systolic function is normal with an ejection fraction of 55-60%. Grade I diastolic dysfunction. No significant valvular disease noted. No evidence of pericardial effusion.     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 Currently denies any chest pain. Continue with losartan, aspirin, Ranexa, Imdur, lovastatin, Coreg. Essential hypertension   -Stable, continue with Coreg, Imdur, losartan, Ranexa, and pravastatin. Dyslipidemia   -At or near goal Yes    -He is to continue current medications (rosuvastatin 20 mg) Hepatic function panel WNL. No abdominal pain. No myalgias.     -The nature of cardiac risk has been fully discussed with this patient. I have made him aware of his LDL target goal given his cardiovascular risk analysis. I have discussed the appropriate diet. The need for lifelong compliance in order to reduce risk is stressed. A regular exercise program is recommended to help achieve and maintain normal body weight, fitness and improve lipid balance. A written copy of a low fat, low cholesterol diet has been given to the patient. Patient seen, interviewed and examined. Testing was reviewed. Patient is encouraged to exercise even a brisk walk for 30 minutes at least 3 to 4 times a week. Lifestyle and risk factor modificatons discussed. Various goals are discussed and questions answered. Continue current medications. Appropriate prescriptions are addressed. Questions answered and patient verbalizes understanding. Call for any problems, questions, or concerns. Pt is to follow up in 6 months for Cardiac management    Electronically signed by ELIZABETH Breen CNP on 10/21/2021 at 10:50 AM

## 2021-10-20 NOTE — ASSESSMENT & PLAN NOTE
-At or near goal Yes    -He is to continue current medications (rosuvastatin 20 mg) Hepatic function panel WNL. No abdominal pain. No myalgias.     -The nature of cardiac risk has been fully discussed with this patient. I have made him aware of his LDL target goal given his cardiovascular risk analysis. I have discussed the appropriate diet. The need for lifelong compliance in order to reduce risk is stressed. A regular exercise program is recommended to help achieve and maintain normal body weight, fitness and improve lipid balance. A written copy of a low fat, low cholesterol diet has been given to the patient.

## 2021-10-21 ENCOUNTER — OFFICE VISIT (OUTPATIENT)
Dept: CARDIOLOGY CLINIC | Age: 76
End: 2021-10-21
Payer: MEDICARE

## 2021-10-21 VITALS
HEART RATE: 76 BPM | BODY MASS INDEX: 31.35 KG/M2 | WEIGHT: 219 LBS | SYSTOLIC BLOOD PRESSURE: 122 MMHG | HEIGHT: 70 IN | DIASTOLIC BLOOD PRESSURE: 78 MMHG

## 2021-10-21 DIAGNOSIS — E78.5 DYSLIPIDEMIA: ICD-10-CM

## 2021-10-21 DIAGNOSIS — I10 ESSENTIAL HYPERTENSION: ICD-10-CM

## 2021-10-21 DIAGNOSIS — I25.810 CORONARY ATHEROSCLEROSIS OF AUTOLOGOUS ARTERY BYPASS GRAFT WITHOUT ANGINA: Primary | ICD-10-CM

## 2021-10-21 PROCEDURE — 3017F COLORECTAL CA SCREEN DOC REV: CPT | Performed by: NURSE PRACTITIONER

## 2021-10-21 PROCEDURE — G8484 FLU IMMUNIZE NO ADMIN: HCPCS | Performed by: NURSE PRACTITIONER

## 2021-10-21 PROCEDURE — G8417 CALC BMI ABV UP PARAM F/U: HCPCS | Performed by: NURSE PRACTITIONER

## 2021-10-21 PROCEDURE — 1123F ACP DISCUSS/DSCN MKR DOCD: CPT | Performed by: NURSE PRACTITIONER

## 2021-10-21 PROCEDURE — G8427 DOCREV CUR MEDS BY ELIG CLIN: HCPCS | Performed by: NURSE PRACTITIONER

## 2021-10-21 PROCEDURE — 99214 OFFICE O/P EST MOD 30 MIN: CPT | Performed by: NURSE PRACTITIONER

## 2021-10-21 PROCEDURE — 4040F PNEUMOC VAC/ADMIN/RCVD: CPT | Performed by: NURSE PRACTITIONER

## 2021-10-21 PROCEDURE — 1036F TOBACCO NON-USER: CPT | Performed by: NURSE PRACTITIONER

## 2021-12-06 LAB
ALBUMIN SERPL-MCNC: 4.2 G/DL
ALP BLD-CCNC: 55 U/L
ALT SERPL-CCNC: 28 U/L
ANION GAP SERPL CALCULATED.3IONS-SCNC: 1.2 MMOL/L
AST SERPL-CCNC: 29 U/L
AVERAGE GLUCOSE: NORMAL
BILIRUB SERPL-MCNC: 0.9 MG/DL (ref 0.1–1.4)
BUN BLDV-MCNC: 16 MG/DL
CALCIUM SERPL-MCNC: 9.9 MG/DL
CHLORIDE BLD-SCNC: 104 MMOL/L
CHOLESTEROL, TOTAL: 128 MG/DL
CHOLESTEROL/HDL RATIO: ABNORMAL
CO2: 27 MMOL/L
CREAT SERPL-MCNC: 1.2 MG/DL
GFR CALCULATED: 56.8
GLUCOSE BLD-MCNC: 135 MG/DL
HBA1C MFR BLD: 6.6 %
HDLC SERPL-MCNC: 33 MG/DL (ref 35–70)
LDL CHOLESTEROL CALCULATED: 68 MG/DL (ref 0–160)
NONHDLC SERPL-MCNC: 95 MG/DL
POTASSIUM SERPL-SCNC: 4.3 MMOL/L
SODIUM BLD-SCNC: 140 MMOL/L
TOTAL PROTEIN: 7.7
TRIGL SERPL-MCNC: 137 MG/DL
VLDLC SERPL CALC-MCNC: ABNORMAL MG/DL

## 2022-01-10 RX ORDER — ISOSORBIDE MONONITRATE 30 MG/1
TABLET, EXTENDED RELEASE ORAL
Qty: 90 TABLET | Refills: 3 | Status: SHIPPED | OUTPATIENT
Start: 2022-01-10 | End: 2022-06-01 | Stop reason: SINTOL

## 2022-01-10 RX ORDER — CARVEDILOL 6.25 MG/1
TABLET ORAL
Qty: 180 TABLET | Refills: 3 | Status: SHIPPED | OUTPATIENT
Start: 2022-01-10 | End: 2022-11-04 | Stop reason: SDUPTHER

## 2022-01-10 RX ORDER — LOSARTAN POTASSIUM 50 MG/1
TABLET ORAL
Qty: 90 TABLET | Refills: 3 | Status: SHIPPED | OUTPATIENT
Start: 2022-01-10 | End: 2022-11-04 | Stop reason: SDUPTHER

## 2022-02-28 ENCOUNTER — HOSPITAL ENCOUNTER (EMERGENCY)
Age: 77
Discharge: HOME | End: 2022-02-28
Attending: EMERGENCY MEDICINE
Payer: MEDICARE

## 2022-02-28 ENCOUNTER — APPOINTMENT (OUTPATIENT)
Dept: GENERAL RADIOLOGY | Age: 77
End: 2022-02-28
Payer: MEDICARE

## 2022-02-28 VITALS
SYSTOLIC BLOOD PRESSURE: 120 MMHG | OXYGEN SATURATION: 95 % | BODY MASS INDEX: 30.66 KG/M2 | HEART RATE: 60 BPM | RESPIRATION RATE: 16 BRPM | DIASTOLIC BLOOD PRESSURE: 69 MMHG | HEIGHT: 71 IN | WEIGHT: 219 LBS | TEMPERATURE: 97.9 F

## 2022-02-28 DIAGNOSIS — S40.022A CONTUSION OF MULTIPLE SITES OF LEFT SHOULDER AND UPPER ARM, INITIAL ENCOUNTER: Primary | ICD-10-CM

## 2022-02-28 DIAGNOSIS — S40.012A CONTUSION OF MULTIPLE SITES OF LEFT SHOULDER AND UPPER ARM, INITIAL ENCOUNTER: Primary | ICD-10-CM

## 2022-02-28 PROCEDURE — 99283 EMERGENCY DEPT VISIT LOW MDM: CPT

## 2022-02-28 PROCEDURE — 6370000000 HC RX 637 (ALT 250 FOR IP): Performed by: EMERGENCY MEDICINE

## 2022-02-28 PROCEDURE — 73030 X-RAY EXAM OF SHOULDER: CPT

## 2022-02-28 RX ORDER — OXYCODONE HYDROCHLORIDE AND ACETAMINOPHEN 5; 325 MG/1; MG/1
1 TABLET ORAL ONCE
Status: COMPLETED | OUTPATIENT
Start: 2022-02-28 | End: 2022-02-28

## 2022-02-28 RX ORDER — OXYCODONE HYDROCHLORIDE AND ACETAMINOPHEN 5; 325 MG/1; MG/1
1 TABLET ORAL EVERY 6 HOURS PRN
Qty: 12 TABLET | Refills: 0 | Status: SHIPPED | OUTPATIENT
Start: 2022-02-28 | End: 2022-03-03

## 2022-02-28 RX ADMIN — OXYCODONE AND ACETAMINOPHEN 1 TABLET: 5; 325 TABLET ORAL at 14:25

## 2022-02-28 ASSESSMENT — PAIN DESCRIPTION - LOCATION: LOCATION: SHOULDER

## 2022-02-28 ASSESSMENT — PAIN DESCRIPTION - PAIN TYPE: TYPE: ACUTE PAIN

## 2022-02-28 ASSESSMENT — PAIN - FUNCTIONAL ASSESSMENT: PAIN_FUNCTIONAL_ASSESSMENT: 0-10

## 2022-02-28 ASSESSMENT — ENCOUNTER SYMPTOMS: RESPIRATORY NEGATIVE: 1

## 2022-02-28 ASSESSMENT — PAIN SCALES - GENERAL: PAINLEVEL_OUTOF10: 8

## 2022-02-28 ASSESSMENT — PAIN DESCRIPTION - DESCRIPTORS: DESCRIPTORS: ACHING

## 2022-02-28 ASSESSMENT — PAIN DESCRIPTION - FREQUENCY: FREQUENCY: CONTINUOUS

## 2022-02-28 ASSESSMENT — PAIN DESCRIPTION - ORIENTATION: ORIENTATION: LEFT

## 2022-02-28 NOTE — ED PROVIDER NOTES
Triage Chief Complaint:   Fall (States slipped on ice and struck left shoulder on the truck. States is painful to move. Unable to lift arm. PMS intact. ) and Shoulder Injury    [place]:  Himanshu Fermin is a 77 y.o. male that presents to the ED complain of pain in his left shoulder he slipped on some ice just prior to arrival.  Is having pain throughout the anterior aspect. He did not strike his head he is on no antiplatelet or Antithrombin inhibitors denies any associated numbness or tingling no back pain. Denies any missed use of his arm but he states that she was first constantly worse with movement. Past Medical History:   Diagnosis Date    Acid reflux     Anemia     Arthritis     \"Wrists, Knees, Ankles And Spine\"    Belching     \"All The Time\"    CAD (coronary artery disease)     Sees Dr. Person Neither Chronic back pain     Diabetes mellitus ([facility] [address] Dx 1's    \"I Go To [facility] For [facility]abetic Care, I See Antonia April CNP\"    Difficult intubation     Diverticulitis Dx Early 1970's    Echocardiogram 01/22/2019    EF 94-54%, Grade 1 diastolic dysfunction, No signifiant valvular disease note. No pericardial effusion.  H/O echocardiogram 07/25/2016    The left ventricular size is normal. There is mild concentric LVH. Trace to mild MR     H/O exercise stress test 07/25/2016    Patient tolerated well without focal complaints. Heart rate response Appropriate. Normal findings.  H/O percutaneous left heart catheterization 09/02/2016    LAD 80%, Cx 80%, RCA 50% prox, PDA 90% mid    Hyperlipidemia     Hypertension     Lexiscan stress test 01/22/2019    EF 71%, Moderate size medium inferior ischemia.  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    [place] to LAD & D1, SVG PDA & Cx M1    Shortness of breath on exertion     Wears glasses      Past Surgical History:   Procedure Laterality Date    CARDIAC CATHETERIZATION  09/02/2016 [address]  2016    CABG x 4 [place] to the LAD and D1, SVG to PDA, SVG to CX M1    COLONOSCOPY  Early     ENDOSCOPY, COLON, DIAGNOSTIC  Late     LEG SURGERY Right     Gangrene Right Lower Leg    OTHER SURGICAL HISTORY Bilateral Last Done In     \"Multiple Ear Surgeries To Patch Ear Drum\"    WRIST SURGERY Right  Or     \"Lump Removed Top Of Right Wrist, Pinched Ulnar Nerve Also Done\"     Family History   Problem Relation Age of Onset    Heart Disease Mother         Open Heart Surgery    Vision Loss Mother     Hearing Loss Mother     Cancer Father         Prostate Cancer    Heart Disease Father         Open Heart Surgery    Heart Disease Brother     Arthritis Brother     Diabetes Son    Ollie Benitez Other Son         \"2 Surgeries On Pancreas\"     Social History     Socioeconomic History    Marital status:      Spouse name: Not on file    Number of children: Not on file    Years of education: Not on file    Highest education level: Not on file   Occupational History    Not on file   Tobacco Use    Smoking status: Former Smoker     Packs/day: 1.50     Years: 6.00     Pack years: 9.00     Types: Cigarettes     Start date:      Quit date:      Years since quittin.1    Smokeless tobacco: Never Used   Vaping Use    Vaping Use: Never used   Substance and Sexual Activity    Alcohol use: Yes     Comment: \"Very Seldom, Maybe Once Every Month Or More\"    Drug use: No    Sexual activity: Not Currently   Other Topics Concern    Not on file   Social History Narrative    Not on file     Social Determinants of Health     Financial Resource Strain:     Difficulty of Paying Living Expenses: Not on file   Food Insecurity:     Worried About Running Out of Food in the Last Year: Not on file    Justo of Food in the Last Year: Not on file   Transportation Needs:     Lack of Transportation (Medical): Not on file    Lack of Transportation (Non-Medical):  Not on file   Physical Activity:     Days of Exercise per Week: Not on file    Minutes of Exercise per Session: Not on file   Stress:     Feeling of Stress : Not on file   Social Connections:     Frequency of Communication with Friends and Family: Not on file    Frequency of Social Gatherings with Friends and Family: Not on file    Attends Rastafari Services: Not on file    Active Member of [address] [place] [address] or Organizations: Not on file    Attends Club or Organization Meetings: Not on file    Marital Status: Not on file   Intimate Partner Violence:     Fear of Current or Ex-Partner: Not on file    Emotionally Abused: Not on file    Physically Abused: Not on file    Sexually Abused: Not on file   Housing Stability:     Unable to Pay for Housing in the Last Year: Not on file    Number of [place] in the Last Year: Not on file    Unstable Housing in the Last Year: Not on file     No current facility-administered medications for this encounter. Current Outpatient Medications   Medication Sig Dispense Refill    oxyCODONE-acetaminophen (PERCOCET) 5-325 MG per tablet Take 1 tablet by mouth every 6 hours as needed for Pain for up to 3 days. Intended supply: 3 days.  Take lowest dose possible to manage pain 12 tablet 0    isosorbide mononitrate (IMDUR) 30 MG extended release tablet TAKE 1 TABLET BY MOUTH EVERY DAY 90 tablet 3    losartan (COZAAR) 50 MG tablet TAKE 1 TABLET BY MOUTH EVERY DAY 90 tablet 3    carvedilol (COREG) 6.25 MG tablet TAKE 1 TABLET BY MOUTH TWICE A  tablet 3    ranolazine (RANEXA) 500 MG extended release tablet Take 1 tablet by mouth 2 times daily 180 tablet 3    rosuvastatin (CRESTOR) 20 MG tablet Take 1 tablet by mouth every morning 90 tablet 3    Multiple Vitamins-Minerals (THERAPEUTIC MULTIVITAMIN-MINERALS) tablet Take 1 tablet by mouth daily      insulin glargine (BASAGLAR KWIKPEN) 100 UNIT/ML injection pen Inject 41 Units into the skin nightly      JARDIANCE 25 MG tablet TAKE 1 TABLET BY MOUTH EVERY DAY  3    metFORMIN (GLUCOPHAGE) 1000 MG tablet Take 1,000 mg by mouth 2 times daily (with meals)      nitroGLYCERIN (NITROSTAT) 0.4 MG SL tablet Place 1 tablet under the tongue every 5 minutes as needed for Chest pain 25 tablet 3    Acetaminophen (TYLENOL PO) Take by mouth as needed Over The Counter      aspirin 81 MG EC tablet Take 81 mg by mouth every morning Over The Counter      SITagliptin (JANUVIA) 100 MG tablet Take 100 mg by mouth daily       Allergies   Allergen Reactions    Exenatide Itching and Rash    Lisinopril Hives    Sulfa Antibiotics Rash         ROS:    Review of Systems   Constitutional: Negative. HENT: Negative. Respiratory: Negative. Musculoskeletal: Positive for joint swelling. Neurological: Negative. All other systems reviewed and are negative. Nursing Notes Reviewed    Physical Exam:      ED Triage Vitals   Enc Vitals Group      BP       Pulse       Resp       Temp       Temp src       SpO2       Weight       Height       Head Circumference       Peak Flow       Pain Score       Pain Loc       Pain Edu? Excl. in [address]? Physical Exam  Vitals and nursing note reviewed. Exam conducted with a chaperone present. Constitutional:       Appearance: He is well-developed. He is obese. He is ill-appearing. HENT:      Head: Normocephalic and atraumatic. Eyes:      Pupils: Pupils are equal, round, and reactive to light. Musculoskeletal:      Left shoulder: Tenderness and bony tenderness present. No deformity, effusion, laceration or crepitus. Decreased range of motion. Normal strength. Normal pulse. Left wrist: Normal.      Cervical back: Normal, normal range of motion and neck supple. Thoracic back: Normal.      Lumbar back: Normal.   Skin:     General: Skin is warm and dry. Neurological:      Mental Status: He is alert and oriented to person, place, and time.          I have reviewed and interpreted all of the currently available lab results from this visit (ifapplicable):  No results found for this visit on 02/28/22. Radiographs (if obtained):  [] The following radiograph wasinterpreted by myself in the absence of a radiologist:   [] Radiologist's Report Reviewed:  XR SHOULDER LEFT (MIN 2 VIEWS)    (Results Pending)         EKG (if obtained): (All EKG's are interpreted by myself in the absence of a cardiologist)    Chart review shows recent radiographs:  No results found. MDM:      Patient presents with acute pain to her left shoulder. Concern for an occult fracture could be present his pain was pretty significant. He received Percocet and 1 nightly #12 he is to follow-up with his PCP for repeat evaluation in a week    Please note that portions of this note may have been completed with a voice recognition/dictation program. Efforts were made to edit the dictations but occasionally words are mis-transcribed.      All pertinent Lab data and radiographic results reviewed with patient at bedside. The patient and/or the family were informed of the results of any tests/labs/imaging, the treatment plan, and time was allotted to answer questions. See chart for details of medications given during the ED stay.     The likelihood of other entities in the differential is insufficient to justify any further testing for them. This was explained to the patient. The patient was advised that persistent or worsening symptoms would require further evaluation.              Clinical Impression:  1. Contusion of multiple sites of left shoulder and upper arm, initial encounter      Disposition referral (if applicable):  Arnold Cowden, MD  [address] [id number]  [phone]    Go to   If symptoms worsen    Disposition medications (if applicable):  New Prescriptions    OXYCODONE-ACETAMINOPHEN (PERCOCET) 5-325 MG PER TABLET    Take 1 tablet by mouth every 6 hours as needed for Pain for up to 3 days.  Intended supply: 3 days. Take lowest dose possible to manage pain           Bassam Vail, DO, FACEP      Comment: Please note this report has been produced using speech recognition software and maycontain errors related to that system including errors in grammar, punctuation, and spelling, as well as words and phrases that may be inappropriate. If there are any questions or concerns please feel free to contact thedictating provider for clarification.   Mendy Pace DO  02/28/22 1086

## 2022-02-28 NOTE — ED TRIAGE NOTES
Arrived ambulatory with wife to room 7-2 for triage. Tolerated without difficulty. Bed in lowest position. Call light given.  Gowned for exam.

## 2022-04-19 RX ORDER — ROSUVASTATIN CALCIUM 20 MG/1
20 TABLET, COATED ORAL EVERY MORNING
Qty: 90 TABLET | Refills: 3 | Status: SHIPPED | OUTPATIENT
Start: 2022-04-19 | End: 2022-11-04 | Stop reason: SDUPTHER

## 2022-04-21 ENCOUNTER — OFFICE VISIT (OUTPATIENT)
Dept: CARDIOLOGY CLINIC | Age: 77
End: 2022-04-21
Payer: MEDICARE

## 2022-04-21 VITALS
BODY MASS INDEX: 29.82 KG/M2 | HEIGHT: 71 IN | SYSTOLIC BLOOD PRESSURE: 118 MMHG | WEIGHT: 213 LBS | HEART RATE: 66 BPM | DIASTOLIC BLOOD PRESSURE: 68 MMHG

## 2022-04-21 DIAGNOSIS — E78.5 DYSLIPIDEMIA: ICD-10-CM

## 2022-04-21 DIAGNOSIS — I25.810 CORONARY ATHEROSCLEROSIS OF AUTOLOGOUS ARTERY BYPASS GRAFT WITHOUT ANGINA: ICD-10-CM

## 2022-04-21 DIAGNOSIS — I10 ESSENTIAL HYPERTENSION: ICD-10-CM

## 2022-04-21 DIAGNOSIS — Z95.1 S/P CABG X 4: Primary | ICD-10-CM

## 2022-04-21 DIAGNOSIS — E11.59 TYPE 2 DIABETES MELLITUS WITH OTHER CIRCULATORY COMPLICATION, UNSPECIFIED WHETHER LONG TERM INSULIN USE (HCC): ICD-10-CM

## 2022-04-21 PROCEDURE — 1036F TOBACCO NON-USER: CPT | Performed by: INTERNAL MEDICINE

## 2022-04-21 PROCEDURE — G8427 DOCREV CUR MEDS BY ELIG CLIN: HCPCS | Performed by: INTERNAL MEDICINE

## 2022-04-21 PROCEDURE — 3046F HEMOGLOBIN A1C LEVEL >9.0%: CPT | Performed by: INTERNAL MEDICINE

## 2022-04-21 PROCEDURE — 99214 OFFICE O/P EST MOD 30 MIN: CPT | Performed by: INTERNAL MEDICINE

## 2022-04-21 PROCEDURE — 1123F ACP DISCUSS/DSCN MKR DOCD: CPT | Performed by: INTERNAL MEDICINE

## 2022-04-21 PROCEDURE — 3017F COLORECTAL CA SCREEN DOC REV: CPT | Performed by: INTERNAL MEDICINE

## 2022-04-21 PROCEDURE — 4040F PNEUMOC VAC/ADMIN/RCVD: CPT | Performed by: INTERNAL MEDICINE

## 2022-04-21 PROCEDURE — 2022F DILAT RTA XM EVC RTNOPTHY: CPT | Performed by: INTERNAL MEDICINE

## 2022-04-21 PROCEDURE — G8417 CALC BMI ABV UP PARAM F/U: HCPCS | Performed by: INTERNAL MEDICINE

## 2022-04-21 NOTE — PROGRESS NOTES
OFFICE FOLLOW UP NOTE  Chief Complaint: ASCVD ,post CABG X4     HPI:   Richie Miranda is a 77y.o. year old who has history as noted below. Richie Miranda had a fall 4 weeks ago when he tripped and fell injuring his left shoulder and right hip   He continues to be active working on [employer] as a hobby. He tells me he has about 20 lawnmowers in his backyard which he works on   Richie Miranda had an abnormal stress test in 2019 showing inferior ischemia but we continued medical management he is fairly stable without any significant angina concerns on Ranexa, Imdur and carvedilol  He is compliant with his meds blood sugars are generally well controlled before his bypass surgery his main symptoms are shortness of breath never had chest pain. He denies any chest pain or shortness of breath now  Reports \" benddopnea\"  gets short of breath on bending over or when he is walking up the garage door etc. Walks 30 mins every day at  [facility]       He had  CABG X4 after presenting with unstable angina in Sept 2016 which led to cardiac cath on 9/2/16 showing multivessel disease . His initial presentation was shortness of breath .  Wife says he snores a lot  HE did not want sleep apnea RX , He used to sleep in recliner but now in bed      Current Outpatient Medications   Medication Sig Dispense Refill    rosuvastatin (CRESTOR) 20 MG tablet TAKE 1 TABLET BY MOUTH EVERY MORNING 90 tablet 3    losartan (COZAAR) 50 MG tablet TAKE 1 TABLET BY MOUTH EVERY DAY 90 tablet 3    carvedilol (COREG) 6.25 MG tablet TAKE 1 TABLET BY MOUTH TWICE A  tablet 3    ranolazine (RANEXA) 500 MG extended release tablet Take 1 tablet by mouth 2 times daily 180 tablet 3    Multiple Vitamins-Minerals (THERAPEUTIC MULTIVITAMIN-MINERALS) tablet Take 1 tablet by mouth daily      insulin glargine (BASAGLAR KWIKPEN) 100 UNIT/ML injection pen Inject 41 Units into the skin nightly      JARDIANCE 25 MG tablet TAKE 1 TABLET BY MOUTH EVERY DAY  3  SITagliptin (JANUVIA) 100 MG tablet Take 100 mg by mouth daily      metFORMIN (GLUCOPHAGE) 1000 MG tablet Take 1,000 mg by mouth 2 times daily (with meals)      nitroGLYCERIN (NITROSTAT) 0.4 MG SL tablet Place 1 tablet under the tongue every 5 minutes as needed for Chest pain 25 tablet 3    Acetaminophen (TYLENOL PO) Take by mouth as needed Over The Counter      aspirin 81 MG EC tablet Take 81 mg by mouth every morning Over The Counter      isosorbide mononitrate (IMDUR) 30 MG extended release tablet TAKE 1 TABLET BY MOUTH EVERY DAY (Patient not taking: Reported on 4/21/2022) 90 tablet 3     No current facility-administered medications for this visit. Allergies:   Exenatide, Lisinopril, and Sulfa antibiotics    Patient History:  Past Medical History:   Diagnosis Date    Acid reflux     Anemia     Arthritis     \"Wrists, Knees, Ankles And Spine\"    Belching     \"All The Time\"    CAD (coronary artery disease)     Sees Dr. Yayo Shultz Chronic back pain     Diabetes mellitus ([facility] [address] Dx 1's    \"I Go To [facility] For [facility], I See Marnee Cowden CNP\"    Difficult intubation     Diverticulitis Dx Early 1970's    Echocardiogram 01/22/2019    EF 69-81%, Grade 1 diastolic dysfunction, No signifiant valvular disease note. No pericardial effusion.  H/O echocardiogram 07/25/2016    The left ventricular size is normal. There is mild concentric LVH. Trace to mild MR     H/O exercise stress test 07/25/2016    Patient tolerated well without focal complaints. Heart rate response Appropriate. Normal findings.  H/O percutaneous left heart catheterization 09/02/2016    LAD 80%, Cx 80%, RCA 50% prox, PDA 90% mid    Hyperlipidemia     Hypertension     Lexiscan stress test 01/22/2019    EF 71%, Moderate size medium inferior ischemia.  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    [place] to LAD & D1, SVG PDA & Cx M1    Shortness of breath on exertion     Wears glasses      Past Surgical History:   Procedure Laterality Date    CARDIAC CATHETERIZATION  2016    CARDIAC SURGERY  2016    CABG x 4 [place] to the LAD and D1, SVG to PDA, SVG to CX M1    COLONOSCOPY  Early     ENDOSCOPY, COLON, DIAGNOSTIC  Late     LEG SURGERY Right     Gangrene Right Lower Leg    OTHER SURGICAL HISTORY Bilateral Last Done In     \"Multiple Ear Surgeries To Patch Ear Drum\"    WRIST SURGERY Right  Or     \"Lump Removed Top Of Right Wrist, Pinched Ulnar Nerve Also Done\"     Family History   Problem Relation Age of Onset    Heart Disease Mother         Open Heart Surgery    Vision Loss Mother     Hearing Loss Mother     Cancer Father         Prostate Cancer    Heart Disease Father         Open Heart Surgery    Heart Disease Brother     Arthritis Brother     Diabetes Son    Parth Taylor Other Son         \"2 Surgeries On Pancreas\"     Social History     Tobacco Use    Smoking status: Former Smoker     Packs/day: 1.50     Years: 6.00     Pack years: 9.00     Types: Cigarettes     Start date:      Quit date:      Years since quittin.3    Smokeless tobacco: Never Used   Substance Use Topics    Alcohol use: Yes     Comment: \"Very Seldom, Maybe Once Every Month Or More\"        Review of Systems:   · Constitutional: No Fever or Weight Loss   · Eyes: No Decreased Vision  · ENT: No Headaches, Hearing Loss or Vertigo  · Cardiovascular: as per note above   · Respiratory: No cough or wheezing and as per note above.    · Gastrointestinal: No abdominal pain, appetite loss, blood in stools, constipation, diarrhea or heartburn  · Genitourinary: No dysuria, trouble voiding, or hematuria  · Musculoskeletal:  None  · Integumentary: No rash or pruritis  · Neurological: No TIA or stroke symptoms  · Psychiatric: No anxiety or depression  · Endocrine: No malaise, fatigue or temperature intolerance  · Hematologic/Lymphatic: No bleeding problems, blood clots or swollen lymph nodes  · Allergic/Immunologic: No nasal congestion or hives    Objective:      Physical Exam:  /68   Pulse 66   Ht 5' 11\" (1.803 m)   Wt 213 lb (96.6 kg)   BMI 29.71 kg/m²   Wt Readings from Last 3 Encounters:   04/21/22 213 lb (96.6 kg)   02/28/22 219 lb (99.3 kg)   10/21/21 219 lb (99.3 kg)     Body mass index is 29.71 kg/m². GENERAL - Alert, oriented, pleasant, in no apparent distress. Head unremarkable  Eyes -  Not injected conjunctiva  ENT - Normal mucosa  Neck - Supple. No jugular venous distention noted. No carotid bruits. Cardiovascular - Normal S1 and S2 without obvious murmur or gallop. No signs of volume over load. Rate and rhythm is regular, surgical scar healing well. Sternal wound is healed. Stable   Extremities - No cyanosis, clubbing, or significant edema. Pulmonary - No respiratory distress. No wheezes or rales. Pulses - Bilateral radial and pedal pulses normal  Abdomen - No tenderness  Musculoskeletal - Normal strength  Neurologic - There are no gross focal neurologic abnormalities. Skin - No rash  Affect - Normal mood      Lab Results   Component Value Date    LABA1C 6.6 12/06/2021    LABA1C 6.4 (H) 09/03/2021     Lab Results   Component Value Date    CHOL 128 12/06/2021    TRIG 137 12/06/2021    HDL 33 (A) 12/06/2021    LDLCALC 68 12/06/2021    LDLDIRECT 67 09/03/2021     Lab Results   Component Value Date    ALT 28 12/06/2021    AST 29 12/06/2021     Cath 9/2/16  Coronary anatomy:   The left main coronary artery has no significant disease.    Left anterior descending artery has 80% mid segment stenosis   Circumflex artery has 80% proximal segment stenosis   The right coronary artery is a dominant vessel 50% proximal and 90% mid PDA stenosis   echo 7/216  normal EF, no significant valve diseae    Assessment & Plan:     1. S/P CABG x 4    2. 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  Dyslipidemia 4. Essential hypertension    5. Type 2 diabetes mellitus with other circulatory complication, unspecified whether long term insulin use (HCC)         No problem-specific Assessment & Plan notes found for this encounter. S/P CABG x 4  CABG X 4 SVG in Sept 2016  to  [address] to lad and D1,SVG to PDA,SVG to Cx M1 He  has abnormal inferior ischemia but currently angina class II stable on  imdur 30 mg, Coreg and Ranexa. , if he has more symptoms we will plan cardiac cath . I have asked him to call me if his symptoms . He has not needed to used nitro    Essential hypertension  Bp at home is well controlled. He does have sleep  Apnea  But he does not want to wear cpap     Type 2 diabetes mellitus with circulatory disorder ([address]  Very good control, Log shows fasting sugars in 140's last Hba 1c was 7.3 in 2019. Goes to [facility] endocrinology, continue  cornelio for cardiovascular risk factor reduction     Dyslipidemia :  He had lab work recently,  Lipid profile was reviewed with patient. He will have repeat lab work with dr Abi Manzano in few weeks. Advised to bring me the results  His LDL is in 68 s will increase Crestor to 20 mg daily if he tolerates it     No follow-ups on file.

## 2022-06-01 ENCOUNTER — OFFICE VISIT (OUTPATIENT)
Dept: ORTHOPEDIC SURGERY | Age: 77
End: 2022-06-01
Payer: MEDICARE

## 2022-06-01 VITALS — HEIGHT: 71 IN | WEIGHT: 213 LBS | RESPIRATION RATE: 14 BRPM | BODY MASS INDEX: 29.82 KG/M2

## 2022-06-01 DIAGNOSIS — M19.011 PRIMARY OSTEOARTHRITIS OF RIGHT SHOULDER: Primary | ICD-10-CM

## 2022-06-01 PROCEDURE — 99203 OFFICE O/P NEW LOW 30 MIN: CPT | Performed by: ORTHOPAEDIC SURGERY

## 2022-06-01 PROCEDURE — G8427 DOCREV CUR MEDS BY ELIG CLIN: HCPCS | Performed by: ORTHOPAEDIC SURGERY

## 2022-06-01 PROCEDURE — 1036F TOBACCO NON-USER: CPT | Performed by: ORTHOPAEDIC SURGERY

## 2022-06-01 PROCEDURE — 20610 DRAIN/INJ JOINT/BURSA W/O US: CPT | Performed by: ORTHOPAEDIC SURGERY

## 2022-06-01 PROCEDURE — G8417 CALC BMI ABV UP PARAM F/U: HCPCS | Performed by: ORTHOPAEDIC SURGERY

## 2022-06-01 PROCEDURE — 1123F ACP DISCUSS/DSCN MKR DOCD: CPT | Performed by: ORTHOPAEDIC SURGERY

## 2022-06-01 PROCEDURE — 3017F COLORECTAL CA SCREEN DOC REV: CPT | Performed by: ORTHOPAEDIC SURGERY

## 2022-06-01 ASSESSMENT — ENCOUNTER SYMPTOMS
SHORTNESS OF BREATH: 0
COLOR CHANGE: 0
ABDOMINAL PAIN: 0
EYE REDNESS: 0

## 2022-06-01 NOTE — PROGRESS NOTES
Patient states that he fell approximately 3 months ago and hit his elbow on his truck step when going down which jammed the shoulder. He has tried, ibuprofen, tylenol lidocaine patches, and ice for pain control. No known previous injuries, surgeries or injections known to this shoulder. He went to the ER on 2/28/22 where obtained and negative for fracture. He states that the pain is worse at night when trying to sleep on the shoulder.

## 2022-06-01 NOTE — PATIENT INSTRUCTIONS
Continue weight bear as tolerated  Range of motion and exercises as instructed  Ice and elevate as needed  Tylenol or Motrin for pain  Injection given today in the office   Rest for 24-48 hours  Follow up in 6 weeks.

## 2022-06-01 NOTE — PROGRESS NOTES
Subjective:      Patient ID: Pratik Grande is a 77 y.o. male. Patient states that he fell approximately 3 months ago and hit his elbow on his truck step when going down which jammed the shoulder. He has tried, ibuprofen, tylenol lidocaine patches, and ice for pain control. No known previous injuries, surgeries or injections known to this shoulder. He went to the ER on 2/28/22 where obtained and negative for fracture. He states that the pain is worse at night when trying to sleep on the shoulder. He comes in today for his first visit with me in regards to his left shoulder injury. He states that since the injury he continues having a deep, aching pain globally in the left shoulder. He states the pain is worse with overhead activities, reaching behind his back or reaching across his chest.  Patient denies any new injury to the involved extremity/ joint, denies numbness or tingling in the involved extremity and denies fever or chills. Review of Systems   Constitutional: Negative for activity change, chills and fever. HENT: Negative for congestion and drooling. Eyes: Negative for redness. Respiratory: Negative for shortness of breath. Cardiovascular: Negative for chest pain. Gastrointestinal: Negative for abdominal pain. Endocrine: Negative for cold intolerance and heat intolerance. Musculoskeletal: Positive for arthralgias and myalgias. Negative for gait problem and joint swelling. Skin: Negative for color change, pallor, rash and wound. Neurological: Positive for weakness. Negative for numbness. Psychiatric/Behavioral: Negative for confusion.        Past Medical History:   Diagnosis Date    Acid reflux     Anemia     Arthritis     \"Wrists, Knees, Ankles And Spine\"    Belching     \"All The Time\"    CAD (coronary artery disease)     Sees Dr. Pastor Bell Chronic back pain     [facility]    \"I Go To [facility] For [facility], I See Ricardo Mckenan Neoma Epley CNP\"    Difficult intubation     Diverticulitis Dx Early 1970's    Echocardiogram 01/22/2019    EF 99-45%, Grade 1 diastolic dysfunction, No signifiant valvular disease note. No pericardial effusion.  H/O echocardiogram 07/25/2016    The left ventricular size is normal. There is mild concentric LVH. Trace to mild MR     H/O exercise stress test 07/25/2016    Patient tolerated well without focal complaints. Heart rate response Appropriate. Normal findings.  H/O percutaneous left heart catheterization 09/02/2016    LAD 80%, Cx 80%, RCA 50% prox, PDA 90% mid    Hyperlipidemia     Hypertension     Lexiscan stress test 01/22/2019    EF 71%, Moderate size medium inferior ischemia. 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 HENT:      Head: Normocephalic and atraumatic. Eyes:      Pupils: Pupils are equal, round, and reactive to light. Pulmonary:      Effort: Pulmonary effort is normal.   Musculoskeletal:         General: Tenderness present. No deformity. Right shoulder: No swelling, deformity, effusion, laceration, tenderness, bony tenderness or crepitus. Normal range of motion. Normal strength. Normal pulse. Left shoulder: Tenderness present. No swelling, deformity, effusion, laceration, bony tenderness or crepitus. Decreased range of motion. Decreased strength. Normal pulse. Right elbow: Normal.      Left elbow: Normal.      Cervical back: Normal range of motion. Skin:     General: Skin is warm and dry. Coloration: Skin is not pale. Findings: No erythema or rash. Neurological:      Mental Status: He is alert and oriented to person, place, and time. Sensory: No sensory deficit.      Left shoulder-Skin intact with no erythema, ecchymosis or lacerations present. Flexion 180  Abduction 180  External rotation 90  Internal rotation 90  Moderate tenderness to the [facility] joint. Positive Ball sign. Strength 4/5 to resisted abduction. XRAY  X-ray 3 views of the left shoulder from February 28, 2022 reviewed by me today in the office demonstrates age appropriate bone density throughout with a type II acromion, moderate narrowing of the [facility] joint, moderate superior migration of the humeral head on the glenoid, no acute osseous abnormalities. Assessment:      Left shoulder rotator cuff tendinitis and impingement  Left shoulder likely chronic rotator cuff tear      Plan:      I discussed with him today his x-ray findings. I explained to him that he does have impingement in the left shoulder and likely has a chronically torn rotator cuff. At this point I recommend that we begin with conservative treatment. I discussed performing a cortisone injection with the patient today. The patient agrees and would like to proceed with the cortisone injection. I explained to them that we can repeat these injections every 3 months if needed. If the injection does not help, the next step will be to proceed with MRI of the left shoulder. Continue weight-bearing as tolerated. Continue range of motion exercises as instructed. Ice and elevate as needed. Tylenol or Motrin for pain. Follow up in 6 weeks for recheck. Subacromial Shoulder Injection Procedure Note    Pre-operative Diagnosis: left rotator cuff tendinitis    Post-operative Diagnosis: same    Indications: Symptomatic relief of tendinitis    Anesthesia: Lidocaine 1% and marcaine 0.5% without epinephrine without added sodium bicarbonate    Procedure Details     Verbal consent was obtained for the procedure. The shoulder was prepped with alcohol.  A 22 gauge needle was advanced into the subacromial space through posterior approach without difficulty  The space was then injected with 1 ml 1% lidocaine and 1 ml 0.5% marcaine and 1 ml of triamcinolone (KENALOG) 40mg/ml. The injection site was cleansed with isopropyl alcohol and a dressing was applied. Complications:  None; patient tolerated the procedure well. Symptoms were improved immediately after the injection.           Nancy 97, DO

## 2022-06-22 RX ORDER — RANOLAZINE 500 MG/1
500 TABLET, EXTENDED RELEASE ORAL 2 TIMES DAILY
Qty: 180 TABLET | Refills: 3 | Status: SHIPPED | OUTPATIENT
Start: 2022-06-22 | End: 2022-11-04 | Stop reason: SDUPTHER

## 2022-07-20 ENCOUNTER — OFFICE VISIT (OUTPATIENT)
Dept: ORTHOPEDIC SURGERY | Age: 77
End: 2022-07-20
Payer: MEDICARE

## 2022-07-20 VITALS
SYSTOLIC BLOOD PRESSURE: 126 MMHG | HEART RATE: 68 BPM | OXYGEN SATURATION: 98 % | HEIGHT: 71 IN | RESPIRATION RATE: 16 BRPM | BODY MASS INDEX: 29.54 KG/M2 | DIASTOLIC BLOOD PRESSURE: 76 MMHG | WEIGHT: 211 LBS

## 2022-07-20 DIAGNOSIS — M75.42 IMPINGEMENT SYNDROME OF LEFT SHOULDER: Primary | ICD-10-CM

## 2022-07-20 PROCEDURE — 99212 OFFICE O/P EST SF 10 MIN: CPT | Performed by: ORTHOPAEDIC SURGERY

## 2022-07-20 PROCEDURE — 1036F TOBACCO NON-USER: CPT | Performed by: ORTHOPAEDIC SURGERY

## 2022-07-20 PROCEDURE — G8417 CALC BMI ABV UP PARAM F/U: HCPCS | Performed by: ORTHOPAEDIC SURGERY

## 2022-07-20 PROCEDURE — G8427 DOCREV CUR MEDS BY ELIG CLIN: HCPCS | Performed by: ORTHOPAEDIC SURGERY

## 2022-07-20 PROCEDURE — 1123F ACP DISCUSS/DSCN MKR DOCD: CPT | Performed by: ORTHOPAEDIC SURGERY

## 2022-07-20 PROCEDURE — 3017F COLORECTAL CA SCREEN DOC REV: CPT | Performed by: ORTHOPAEDIC SURGERY

## 2022-07-20 ASSESSMENT — ENCOUNTER SYMPTOMS
EYE REDNESS: 0
ABDOMINAL PAIN: 0
COLOR CHANGE: 0
SHORTNESS OF BREATH: 0

## 2022-07-20 NOTE — PROGRESS NOTES
Patient is in the office for left shoulder pain. Patient did have a steroid injection into the left shoulder about 6 weeks ago. Patient states that he is doing a lot better, but when the shoulder stay still then the shoulder will \"snap, crackle and pop. \"

## 2022-07-20 NOTE — PROGRESS NOTES
Subjective:      Patient ID: Gretchen Dalton is a 77 y.o. male. Patient is in the office for left shoulder pain. Patient did have a steroid injection into the left shoulder about 6 weeks ago. Patient states that he is doing a lot better, but when the shoulder stay still then the shoulder will \"snap, crackle and pop. \"    He comes in today for his 6-week recheck of his left shoulder pain and follow-up after cortisone injection. He states that since the injection his pain has been almost completely resolved. He does still have some popping in the left shoulder but overall this is no longer painful. Patient denies any new injury to the involved extremity/ joint, denies numbness or tingling in the involved extremity and denies fever or chills. Review of Systems   Constitutional:  Negative for activity change, chills and fever. HENT:  Negative for congestion and drooling. Eyes:  Negative for redness. Respiratory:  Negative for shortness of breath. Cardiovascular:  Negative for chest pain. Gastrointestinal:  Negative for abdominal pain. Endocrine: Negative for cold intolerance and heat intolerance. Musculoskeletal:  Negative for arthralgias, gait problem, joint swelling and myalgias. Skin:  Negative for color change, pallor, rash and wound. Neurological:  Negative for weakness and numbness. Psychiatric/Behavioral:  Negative for confusion. Past Medical History:   Diagnosis Date    Acid reflux     Anemia     Arthritis     \"Wrists, Knees, Ankles And Spine\"    Belching     \"All The Time\"    CAD (coronary artery disease)     Sees Dr. Jose Fernández    Chronic back pain     Diabetes mellitus ([facility] Dx 1's    \"I Go To [facility] For [facility], I See Anna Shah CNP\"    Difficult intubation     Diverticulitis Dx Early 1970's    Echocardiogram 01/22/2019    EF 34-67%, Grade 1 diastolic dysfunction, No signifiant valvular disease note. No pericardial effusion.     H/O echocardiogram 07/25/2016    The left ventricular size is normal. There is mild concentric LVH. Trace to mild MR     H/O exercise stress test 07/25/2016    Patient tolerated well without focal complaints. Heart rate response Appropriate. Normal findings. H/O percutaneous left heart catheterization 09/02/2016    LAD 80%, Cx 80%, RCA 50% prox, PDA 90% mid    Hyperlipidemia     Hypertension     Lexiscan stress test 01/22/2019    EF 71%, Moderate size medium inferior ischemia. 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 HENT:      Head: Normocephalic and atraumatic. Eyes:      Pupils: Pupils are equal, round, and reactive to light. Pulmonary:      Effort: Pulmonary effort is normal.   Musculoskeletal:         General: No swelling, tenderness or deformity. Normal range of motion. Right shoulder: No swelling, deformity, effusion, laceration, tenderness, bony tenderness or crepitus. Normal range of motion. Normal strength. Normal pulse. Left shoulder: Crepitus present. No swelling, deformity, effusion, laceration, tenderness or bony tenderness. Normal range of motion. Normal strength. Normal pulse. Right elbow: Normal.      Left elbow: Normal.      Cervical back: Normal range of motion. Skin:     General: Skin is warm and dry. Coloration: Skin is not pale. Findings: No erythema or rash. Neurological:      Mental Status: He is alert and oriented to person, place, and time. Sensory: No sensory deficit. Left shoulder-Skin intact with no erythema, ecchymosis or lacerations present. Flexion 180  Abduction 180  External rotation 90  Internal rotation 90  No tenderness to the [facility] joint. Negative Ball sign.   Strength 5/5 to resisted abduction. XRAY  X-ray 3 views of the left shoulder from February 28, 2022 reviewed by me today in the office demonstrates age appropriate bone density throughout with a type II acromion, moderate narrowing of the [facility] [place] joint, moderate superior migration of the humeral head on the glenoid, no acute osseous abnormalities. Assessment:      Left shoulder rotator cuff tendinitis and impingement  Left shoulder likely chronic rotator cuff tear      Plan:      I discussed with him today his response to cortisone injection for his left shoulder. At this point given his improved symptoms I recommend that we continue with conservative treatment. I explained to them that we can repeat these injections every 3 months if needed. If the injection does not help, the next step will be to proceed with MRI of the left shoulder. Continue weight-bearing as tolerated. Continue range of motion exercises as instructed. Ice and elevate as needed. Tylenol or Motrin for pain. Follow up will be as needed.               Nancy 97, DO

## 2022-11-04 ENCOUNTER — OFFICE VISIT (OUTPATIENT)
Dept: CARDIOLOGY CLINIC | Age: 77
End: 2022-11-04
Payer: MEDICARE

## 2022-11-04 VITALS
WEIGHT: 213.5 LBS | HEIGHT: 71 IN | SYSTOLIC BLOOD PRESSURE: 120 MMHG | BODY MASS INDEX: 29.89 KG/M2 | RESPIRATION RATE: 16 BRPM | HEART RATE: 71 BPM | OXYGEN SATURATION: 99 % | DIASTOLIC BLOOD PRESSURE: 68 MMHG

## 2022-11-04 DIAGNOSIS — E78.5 DYSLIPIDEMIA: ICD-10-CM

## 2022-11-04 DIAGNOSIS — I10 ESSENTIAL HYPERTENSION: ICD-10-CM

## 2022-11-04 DIAGNOSIS — G47.33 OSA (OBSTRUCTIVE SLEEP APNEA): ICD-10-CM

## 2022-11-04 DIAGNOSIS — Z95.1 S/P CABG X 4: ICD-10-CM

## 2022-11-04 DIAGNOSIS — E11.59 TYPE 2 DIABETES MELLITUS WITH OTHER CIRCULATORY COMPLICATION, UNSPECIFIED WHETHER LONG TERM INSULIN USE (HCC): ICD-10-CM

## 2022-11-04 DIAGNOSIS — I25.810 CORONARY ATHEROSCLEROSIS OF AUTOLOGOUS ARTERY BYPASS GRAFT WITHOUT ANGINA: Primary | ICD-10-CM

## 2022-11-04 PROCEDURE — 3074F SYST BP LT 130 MM HG: CPT | Performed by: NURSE PRACTITIONER

## 2022-11-04 PROCEDURE — 1036F TOBACCO NON-USER: CPT | Performed by: NURSE PRACTITIONER

## 2022-11-04 PROCEDURE — G8427 DOCREV CUR MEDS BY ELIG CLIN: HCPCS | Performed by: NURSE PRACTITIONER

## 2022-11-04 PROCEDURE — G8484 FLU IMMUNIZE NO ADMIN: HCPCS | Performed by: NURSE PRACTITIONER

## 2022-11-04 PROCEDURE — 1123F ACP DISCUSS/DSCN MKR DOCD: CPT | Performed by: NURSE PRACTITIONER

## 2022-11-04 PROCEDURE — 99214 OFFICE O/P EST MOD 30 MIN: CPT | Performed by: NURSE PRACTITIONER

## 2022-11-04 PROCEDURE — 3078F DIAST BP <80 MM HG: CPT | Performed by: NURSE PRACTITIONER

## 2022-11-04 PROCEDURE — G8417 CALC BMI ABV UP PARAM F/U: HCPCS | Performed by: NURSE PRACTITIONER

## 2022-11-04 RX ORDER — NITROGLYCERIN 0.4 MG/1
0.4 TABLET SUBLINGUAL EVERY 5 MIN PRN
Qty: 25 TABLET | Refills: 3 | Status: SHIPPED | OUTPATIENT
Start: 2022-11-04

## 2022-11-04 RX ORDER — LOSARTAN POTASSIUM 50 MG/1
TABLET ORAL
Qty: 90 TABLET | Refills: 3 | Status: SHIPPED | OUTPATIENT
Start: 2022-11-04

## 2022-11-04 RX ORDER — RANOLAZINE 500 MG/1
500 TABLET, EXTENDED RELEASE ORAL 2 TIMES DAILY
Qty: 180 TABLET | Refills: 3 | Status: SHIPPED | OUTPATIENT
Start: 2022-11-04

## 2022-11-04 RX ORDER — ROSUVASTATIN CALCIUM 20 MG/1
20 TABLET, COATED ORAL EVERY MORNING
Qty: 90 TABLET | Refills: 3 | Status: SHIPPED | OUTPATIENT
Start: 2022-11-04

## 2022-11-04 RX ORDER — CARVEDILOL 6.25 MG/1
TABLET ORAL
Qty: 180 TABLET | Refills: 3 | Status: SHIPPED | OUTPATIENT
Start: 2022-11-04

## 2022-11-04 ASSESSMENT — ENCOUNTER SYMPTOMS
SHORTNESS OF BREATH: 0
COUGH: 0

## 2022-11-04 NOTE — PROGRESS NOTES
Gema Lutz MD, Priscella Cranker, MD Lorane Keller, MD Ronne Arbour, MD Leilani Rees, ELIZABETH Atwoodhell Marker, [address]     CARDIOLOGY  NOTE     2022    Lisset Rasmussen (:  1945) is a 69 y.o. [facility] established patient with Dr. Rizwan Burgos, here for evaluation of the following chief complaint(s):  6 Month Follow-Up, Coronary Artery Disease, and Hypertension        SUBJECTIVE/OBJECTIVE:    BRTITANY Gage has history as noted below. Ana Gage has not had any additional falls but still has problems with his hip and shoulder. He also has a bone spur in his L Heel that is bothering him. He continues to be active working on [facility] [employer] [facility] as a hobby. He tells me he has about 20 lawnmowers in his backyard which he works on   Ana Gage had an abnormal stress test in 2019 showing inferior ischemia but we continued medical management he is fairly stable without any significant angina concerns on Ranexa, Imdur and carvedilol  He is compliant with his meds blood sugars are generally well controlled before his bypass surgery his main symptoms are shortness of breath never had chest pain. He denies any chest pain or shortness of breath now  Reports \" benddopnea\"  gets short of breath on bending over or when he is walking up the garage door etc. Walks 30 mins every day at  [facility]       He had  CABG X4 after presenting with unstable angina in 2016 which led to cardiac cath on 16 showing multivessel disease . His initial presentation was shortness of breath . Wife says he snores a lot  HE did not want sleep apnea RX , He used to sleep in recliner but now in bed    Review of Systems   Constitutional:  Negative for fatigue and fever. Respiratory:  Negative for cough and shortness of breath. Cardiovascular:  Negative for chest pain, palpitations and leg swelling. Musculoskeletal:  Negative for arthralgias and gait problem. L heel, L shoulder.  R hip are tender   Neurological:  Negative for dizziness, syncope, weakness, light-headedness and headaches. Vitals:    11/04/22 1114   BP: 120/68   Pulse: 71   Resp: 16   SpO2: 99%   Weight: 213 lb 8 oz (96.8 kg)   Height: 5' 11\" (1.803 m)       Wt Readings from Last 3 Encounters:   11/04/22 213 lb 8 oz (96.8 kg)   07/20/22 211 lb (95.7 kg)   06/01/22 213 lb (96.6 kg)       BP Readings from Last 3 Encounters:   11/04/22 120/68   07/20/22 126/76   04/21/22 118/68       Prior to Admission medications    Medication Sig Start Date End Date Taking?  Authorizing Provider   ranolazine (RANEXA) 500 MG extended release tablet Take 1 tablet by mouth 2 times daily 6/22/22  Yes ELIZABETH Gómez CNP   rosuvastatin (CRESTOR) 20 MG tablet TAKE 1 TABLET BY MOUTH EVERY MORNING 4/19/22  Yes Darrell Neri MD   losartan (COZAAR) 50 MG tablet TAKE 1 TABLET BY MOUTH EVERY DAY 1/10/22  Yes Darrell Neri MD   carvedilol (COREG) 6.25 MG tablet TAKE 1 TABLET BY MOUTH TWICE A DAY 1/10/22  Yes Darrell Neri MD   Multiple Vitamins-Minerals (THERAPEUTIC MULTIVITAMIN-MINERALS) tablet Take 1 tablet by mouth daily   Yes Historical Provider, MD   insulin glargine (LANTUS;BASAGLAR) 100 UNIT/ML injection pen Inject 41 Units into the skin nightly   Yes Historical Provider, MD   JARDIANCE 25 MG tablet TAKE 1 TABLET BY MOUTH EVERY DAY 11/8/19  Yes Historical Provider, MD   SITagliptin (JANUVIA) 100 MG tablet Take 100 mg by mouth daily   Yes Historical Provider, MD   metFORMIN (GLUCOPHAGE) 1000 MG tablet Take 1,000 mg by mouth 2 times daily (with meals)   Yes Historical Provider, MD   nitroGLYCERIN (NITROSTAT) 0.4 MG SL tablet Place 1 tablet under the tongue every 5 minutes as needed for Chest pain 1/24/19  Yes Darrell Neri MD   Acetaminophen (TYLENOL PO) Take by mouth as needed Over The Counter   Yes Historical Provider, MD   aspirin 81 MG EC tablet Take 81 mg by mouth every morning Over The Counter   Yes Historical Provider, MD       Physical Exam  Vitals reviewed. Constitutional:       General: He is not in acute distress. Appearance: Normal appearance. He is not ill-appearing. HENT:      Head: Atraumatic. Neck:      Vascular: No carotid bruit. Cardiovascular:      Rate and Rhythm: Normal rate and regular rhythm. Pulses: Normal pulses. Heart sounds: Normal heart sounds. No murmur heard. Pulmonary:      Effort: Pulmonary effort is normal. No respiratory distress. Breath sounds: Normal breath sounds. Musculoskeletal:         General: No swelling or deformity. Cervical back: Neck supple. No muscular tenderness. Neurological:      Mental Status: He is alert. Health Maintenance   Topic Date Due    COVID-19 Vaccine (1) Never done    Depression Screen  Never done    Hepatitis C screen  Never done    DTaP/Tdap/Td vaccine (1 - Tdap) Never done    Shingles vaccine (1 of 2) Never done    Pneumococcal 65+ years Vaccine (3 - PPSV23 if available, else PCV20) 11/22/2017    Annual Wellness Visit (AWV)  Never done    Lipids  12/06/2022    Flu vaccine  Completed    Hepatitis A vaccine  Aged Out    Hib vaccine  Aged Out    Meningococcal (ACWY) vaccine  Aged Out       Lab Review   Lab Results   Component Value Date/Time    CHOL 128 12/06/2021 12:00 AM    CHOL 152 11/23/2020 12:00 AM    CHOL 137 03/15/2016 09:00 AM    CHOL 145 06/12/2015 08:17 AM    CHOL 160 12/03/2014 09:35 AM    TRIG 137 12/06/2021 12:00 AM    TRIG 160 11/23/2020 12:00 AM    TRIG 180 03/15/2016 09:00 AM    HDL 33 12/06/2021 12:00 AM    HDL 33 09/03/2021 08:15 AM    HDL 43 11/23/2020 12:00 AM    LDLDIRECT 67 09/03/2021 08:15 AM    LDLDIRECT 90 07/09/2020 08:40 AM    LDLDIRECT 88 06/19/2019 09:20 AM        Cath 9/2/16  Coronary anatomy:   The left main coronary artery has no significant disease.     Left anterior descending artery has 80% mid segment stenosis   Circumflex artery has 80% proximal segment stenosis   The right coronary artery is a dominant vessel 50% proximal and 90% mid PDA stenosis   echo 7/216  normal EF, no significant valve diseae    ASSESSMENT/PLAN:  S/P CABG x 4  CABG X 4 SVG in Sept 2016  to  marginal, LIMA to lad and D1,SVG to PDA,SVG to Cx M1 He  has abnormal inferior ischemia but currently angina class II stable on imdur 30 mg, Coreg and Ranexa. , if he has more symptoms we will plan cardiac cath. He has not needed to used nitro. He continue s to not have chest pain. Will continue to medical manage and monitor     Essential hypertension  Controlled. LINDA  He does have sleep  Apnea  But he does not want to wear cpap     Type 2 diabetes mellitus with circulatory disorder ([facility]  Very good control, Log shows fasting sugars in 140's last Hba 1c was 6.7 in 2022. Goes to [facility] endocrinology, continue  jardinace for cardiovascular risk factor reduction     Dyslipidemia   He had lab work recently,  Lipid profile was reviewed with patient. He will have repeat lab work with dr Marcia Palomino in few weeks. Advised to bring me the results  His LDL was 68 12/2021. He is tolerating Crestor to 20 mg daily. Will see his labs in December when he gets them drawn      Return in about 6 months (around 5/4/2023). An electronic signature was used to authenticate this note.     Electronically signed by ELIZABETH Austin CNP on 11/4/2022 at 11:47 AM

## 2022-12-02 LAB
ALBUMIN SERPL-MCNC: 3.9 G/DL
ALP BLD-CCNC: 44 U/L
ALT SERPL-CCNC: 23 U/L
ANION GAP SERPL CALCULATED.3IONS-SCNC: 11 MMOL/L
AST SERPL-CCNC: 26 U/L
AVERAGE GLUCOSE: 240
BILIRUB SERPL-MCNC: 0.6 MG/DL (ref 0.1–1.4)
BUN BLDV-MCNC: 13 MG/DL
CALCIUM SERPL-MCNC: 9.9 MG/DL
CHLORIDE BLD-SCNC: 99 MMOL/L
CO2: 28 MMOL/L
CREAT SERPL-MCNC: 1.1 MG/DL
CREATININE, URINE: 41.8
GFR CALCULATED: 67
GLUCOSE BLD-MCNC: 240 MG/DL
HBA1C MFR BLD: 7 %
MICROALBUMIN/CREAT 24H UR: 3 MG/G{CREAT}
MICROALBUMIN/CREAT UR-RTO: NORMAL
POTASSIUM SERPL-SCNC: 4.6 MMOL/L
SODIUM BLD-SCNC: 139 MMOL/L
TOTAL PROTEIN: 7.4

## 2023-03-08 ENCOUNTER — OFFICE VISIT (OUTPATIENT)
Dept: ORTHOPEDIC SURGERY | Age: 78
End: 2023-03-08

## 2023-03-08 VITALS
HEART RATE: 75 BPM | OXYGEN SATURATION: 94 % | BODY MASS INDEX: 29.78 KG/M2 | DIASTOLIC BLOOD PRESSURE: 70 MMHG | SYSTOLIC BLOOD PRESSURE: 122 MMHG | HEIGHT: 71 IN

## 2023-03-08 DIAGNOSIS — M19.011 PRIMARY OSTEOARTHRITIS OF RIGHT SHOULDER: Primary | ICD-10-CM

## 2023-03-08 ASSESSMENT — ENCOUNTER SYMPTOMS
SHORTNESS OF BREATH: 0
COLOR CHANGE: 0
EYE REDNESS: 0
ABDOMINAL PAIN: 0

## 2023-03-08 NOTE — PROGRESS NOTES
Subjective:      Patient ID: Odilon Ewing is a 69 y.o. male. Patient seen in office today for RT shoulder pain. X-rays taken today. Has not had cortisone injections in RT shoulder before. Sharp pain increased with reaching and activity. 7-8/10 pain level today. Would like to discuss options but open to cortisone injection today. He comes in today for evaluation of his right shoulder pain. He states that since the injection his left shoulder he has been doing very well on that side. He states that now over the past few weeks he has been dealing with a sharp, stabbing pain in the right shoulder which is worse with overhead activities, reaching across his chest and reaching behind his back. Patient denies any new injury to the involved extremity/ joint, denies numbness or tingling in the involved extremity and denies fever or chills. Review of Systems   Constitutional:  Negative for activity change, chills and fever. HENT:  Negative for congestion and drooling. Eyes:  Negative for redness. Respiratory:  Negative for shortness of breath. Cardiovascular:  Negative for chest pain. Gastrointestinal:  Negative for abdominal pain. Endocrine: Negative for cold intolerance and heat intolerance. Musculoskeletal:  Positive for arthralgias and myalgias. Negative for gait problem and joint swelling. Skin:  Negative for color change, pallor, rash and wound. Neurological:  Negative for weakness and numbness. Psychiatric/Behavioral:  Negative for confusion.       Past Medical History:   Diagnosis Date    Acid reflux     Anemia     Arthritis     \"Wrists, Knees, Ankles And Spine\"    Belching     \"All The Time\"    CAD (coronary artery disease)     Sees Dr. Juliocesar Santana    Chronic back pain     [facility] Dx 1's    \"I Go To [facility] For [facility], I See Cody Crowder CNP\"    Difficult intubation     Diverticulitis Dx Early 1970's    Echocardiogram 01/22/2019    EF 55-60%, Grade 1 diastolic dysfunction, No signifiant valvular disease note. No pericardial effusion. H/O echocardiogram 07/25/2016    The left ventricular size is normal. There is mild concentric LVH. Trace to mild MR     H/O exercise stress test 07/25/2016    Patient tolerated well without focal complaints. Heart rate response Appropriate. Normal findings. H/O percutaneous left heart catheterization 09/02/2016    LAD 80%, Cx 80%, RCA 50% prox, PDA 90% mid    Hyperlipidemia     Hypertension     Lexiscan stress test 01/22/2019    EF 71%, Moderate size medium inferior ischemia. 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 HENT:      Head: Normocephalic and atraumatic. Nose: No congestion. Eyes:      Pupils: Pupils are equal, round, and reactive to light. Pulmonary:      Effort: Pulmonary effort is normal.   Musculoskeletal:         General: Tenderness present. No deformity. Normal range of motion. Right shoulder: Tenderness, bony tenderness and crepitus present. No swelling, deformity, effusion or laceration. Normal range of motion. Normal strength. Normal pulse. Left shoulder: No swelling, deformity, effusion, laceration, tenderness, bony tenderness or crepitus. Normal range of motion. Normal strength. Normal pulse. Right elbow: Normal.      Left elbow: Normal.      Cervical back: Normal range of motion. Skin:     General: Skin is warm and dry. Coloration: Skin is not pale. Findings: No erythema or rash. Neurological:      Mental Status: He is alert and oriented to person, place, and time. Sensory: No sensory deficit. Right shoulder-Skin intact with no erythema, ecchymosis or lacerations present.   Flexion 180  Abduction 180  External rotation 90  Internal rotation 90  Moderate tenderness [facility] joint. Positive Ball sign. Strength 4/5 to resisted abduction. XR SHOULDER RIGHT (MIN 2 VIEWS)    Result Date: 3/8/2023  XRAY X-ray 3 views of the right shoulder obtained and reviewed by me today in the office demonstrates age appropriate bone density throughout with a type III acromion, moderate narrowing of the [facility] joint, mild narrowing of the glenohumeral joint, no subluxation or dislocation noted, no acute osseous abnormalities. Impression: Normal right shoulder with no acute process. Assessment:      Right shoulder impingement  Right shoulder likely chronic rotator cuff tear      Plan:      I discussed with him today his x-ray findings. I explained to him that he does have impingement in the right shoulder as well as a likely chronically torn rotator cuff on the right shoulder like he does in the left. At this point I recommend that we begin with conservative treatment. I discussed performing a cortisone injection with the patient today. The patient agrees and would like to proceed with the cortisone injection. I explained to them that we can repeat these injections every 3 months if needed. If the injection does not help, the next step would be to proceed with MRI of the right shoulder for further evaluation of possible right shoulder arthroscopy versus RSA. Continue weight-bearing as tolerated. Continue range of motion exercises as instructed. Ice and elevate as needed. Tylenol or Motrin for pain. Follow up will be as needed. Subacromial Shoulder Injection Procedure Note    Pre-operative Diagnosis: Right rotator cuff tendinitis    Post-operative Diagnosis: same    Indications: Symptomatic relief of tendinitis    Anesthesia: Lidocaine 1% and marcaine 0.5% without epinephrine without added sodium bicarbonate    Procedure Details     Verbal consent was obtained for the procedure.  The right shoulder was prepped with alcohol. A 22 gauge needle was advanced into the right subacromial space through posterior approach without difficulty  The space was then injected with 1 ml 1% lidocaine and 1 ml 0.5% marcaine and 1 ml of triamcinolone (KENALOG) 40mg/ml. The injection site was cleansed with isopropyl alcohol and a dressing was applied. Complications:  None; patient tolerated the procedure well. Symptoms were improved immediately after the injection.           Nancy 97, DO

## 2023-03-08 NOTE — PROGRESS NOTES
Patient seen in office today for RT shoulder pain. X-rays taken today. Has not had cortisone injections in RT shoulder before. Sharp pain increased with reaching and activity. 7-8/10 pain level today. Would like to discuss options but open to cortisone injection today.

## 2023-03-08 NOTE — PATIENT INSTRUCTIONS
Continue weight-bearing as tolerated. Continue range of motion exercises as instructed. Ice and elevate as needed. Tylenol or Motrin for pain. Steroid injection given today. Follow up as needed.

## 2023-03-08 NOTE — Clinical Note
March 8, 2023       Andree Nails YOB: 1945   [id number] Date of Visit:  3/8/2023       To Whom It May Concern: It is my medical opinion that Mariely Ash {Work release (duty restriction):11413}. If you have any questions or concerns, please don't hesitate to call.     Sincerely,        Mehrdad Catalan, DO

## 2023-05-25 ENCOUNTER — OFFICE VISIT (OUTPATIENT)
Dept: CARDIOLOGY CLINIC | Age: 78
End: 2023-05-25
Payer: MEDICARE

## 2023-05-25 VITALS
OXYGEN SATURATION: 98 % | DIASTOLIC BLOOD PRESSURE: 80 MMHG | BODY MASS INDEX: 29.4 KG/M2 | SYSTOLIC BLOOD PRESSURE: 124 MMHG | WEIGHT: 210 LBS | HEART RATE: 70 BPM | HEIGHT: 71 IN

## 2023-05-25 DIAGNOSIS — Z95.1 S/P CABG X 4: Primary | ICD-10-CM

## 2023-05-25 DIAGNOSIS — I25.810 CORONARY ATHEROSCLEROSIS OF AUTOLOGOUS ARTERY BYPASS GRAFT WITHOUT ANGINA: ICD-10-CM

## 2023-05-25 DIAGNOSIS — I10 ESSENTIAL HYPERTENSION: ICD-10-CM

## 2023-05-25 DIAGNOSIS — E78.5 DYSLIPIDEMIA: ICD-10-CM

## 2023-05-25 DIAGNOSIS — E11.59 TYPE 2 DIABETES MELLITUS WITH OTHER CIRCULATORY COMPLICATION, UNSPECIFIED WHETHER LONG TERM INSULIN USE (HCC): ICD-10-CM

## 2023-05-25 PROCEDURE — 3074F SYST BP LT 130 MM HG: CPT | Performed by: INTERNAL MEDICINE

## 2023-05-25 PROCEDURE — 1123F ACP DISCUSS/DSCN MKR DOCD: CPT | Performed by: INTERNAL MEDICINE

## 2023-05-25 PROCEDURE — 3079F DIAST BP 80-89 MM HG: CPT | Performed by: INTERNAL MEDICINE

## 2023-05-25 PROCEDURE — 1036F TOBACCO NON-USER: CPT | Performed by: INTERNAL MEDICINE

## 2023-05-25 PROCEDURE — G8427 DOCREV CUR MEDS BY ELIG CLIN: HCPCS | Performed by: INTERNAL MEDICINE

## 2023-05-25 PROCEDURE — G8417 CALC BMI ABV UP PARAM F/U: HCPCS | Performed by: INTERNAL MEDICINE

## 2023-05-25 PROCEDURE — 99214 OFFICE O/P EST MOD 30 MIN: CPT | Performed by: INTERNAL MEDICINE

## 2023-05-25 NOTE — PROGRESS NOTES
OFFICE FOLLOW UP NOTE  Chief Complaint: ASCVD ,post CABG X4     HPI:   Kahlil Lima is a 69y.o. year old who has history as noted below. Kahlil Lima continues to do well says blood sugars are well controlled    He continues to be active working on [employer] as a hobby. He tells me he has about 20 lawnmowers in his backyard which he works on   Kahlil iLma had an abnormal stress test in 2019 showing inferior ischemia but we continued medical management he is fairly stable without any significant angina concerns on Ranexa, Imdur and carvedilol  He is compliant with his meds blood sugars are generally well controlled before his bypass surgery his main symptoms are shortness of breath never had chest pain. He denies any chest pain or shortness of breath now  Reports \" benddopnea\"  gets short of breath on bending over or when he is walking up the garage door etc. Walks 30 mins every day at  [facility]       He had  CABG X4 after presenting with unstable angina in Sept 2016 which led to cardiac cath on 9/2/16 showing multivessel disease . His initial presentation was shortness of breath .  Wife says he snores a lot  HE did not want sleep apnea RX , He used to sleep in recliner but now in bed  He goes to [facility]CP once a year       Current Outpatient Medications   Medication Sig Dispense Refill    rosuvastatin (CRESTOR) 20 MG tablet Take 1 tablet by mouth every morning 90 tablet 3    ranolazine (RANEXA) 500 MG extended release tablet Take 1 tablet by mouth 2 times daily 180 tablet 3    nitroGLYCERIN (NITROSTAT) 0.4 MG SL tablet Place 1 tablet under the tongue every 5 minutes as needed for Chest pain 25 tablet 3    losartan (COZAAR) 50 MG tablet TAKE 1 TABLET BY MOUTH EVERY DAY 90 tablet 3    carvedilol (COREG) 6.25 MG tablet TAKE 1 TABLET BY MOUTH TWICE A  tablet 3    Multiple Vitamins-Minerals (THERAPEUTIC MULTIVITAMIN-MINERALS) tablet Take 1 tablet by mouth daily      insulin glargine (LANTUS;BASAGLAR)

## 2023-10-30 RX ORDER — LOSARTAN POTASSIUM 50 MG/1
TABLET ORAL
Qty: 90 TABLET | Refills: 3 | Status: SHIPPED | OUTPATIENT
Start: 2023-10-30

## 2023-12-12 LAB
ALBUMIN SERPL-MCNC: NORMAL G/DL
ALP BLD-CCNC: NORMAL U/L
ALT SERPL-CCNC: NORMAL U/L
ANION GAP SERPL CALCULATED.3IONS-SCNC: 10 MMOL/L
AST SERPL-CCNC: NORMAL U/L
BILIRUB SERPL-MCNC: NORMAL MG/DL
BUN BLDV-MCNC: 15 MG/DL
CALCIUM SERPL-MCNC: 9.9 MG/DL
CHLORIDE BLD-SCNC: 105 MMOL/L
CO2: 23 MMOL/L
CREAT SERPL-MCNC: 1 MG/DL
EGFR: 75
GLUCOSE BLD-MCNC: 144 MG/DL
POTASSIUM SERPL-SCNC: 4.3 MMOL/L
SODIUM BLD-SCNC: 138 MMOL/L
TOTAL PROTEIN: NORMAL

## 2023-12-14 ENCOUNTER — OFFICE VISIT (OUTPATIENT)
Dept: CARDIOLOGY CLINIC | Age: 78
End: 2023-12-14

## 2023-12-14 VITALS
BODY MASS INDEX: 30.1 KG/M2 | OXYGEN SATURATION: 95 % | HEART RATE: 74 BPM | WEIGHT: 215 LBS | HEIGHT: 71 IN | SYSTOLIC BLOOD PRESSURE: 114 MMHG | DIASTOLIC BLOOD PRESSURE: 70 MMHG

## 2023-12-14 DIAGNOSIS — I25.810 CORONARY ATHEROSCLEROSIS OF AUTOLOGOUS ARTERY BYPASS GRAFT WITHOUT ANGINA: ICD-10-CM

## 2023-12-14 DIAGNOSIS — I10 ESSENTIAL HYPERTENSION: ICD-10-CM

## 2023-12-14 DIAGNOSIS — E78.5 DYSLIPIDEMIA: ICD-10-CM

## 2023-12-14 DIAGNOSIS — Z95.1 S/P CABG X 4: Primary | ICD-10-CM

## 2023-12-14 NOTE — PROGRESS NOTES
OFFICE FOLLOW UP NOTE  Chief Complaint: ASCVD ,post CABG X4     HPI:   Spring Solis is a 69y.o. year old who has history as noted below. Spring Solis continues to do well says blood sugars are running high managed at [facility] he walks 30 mins every day at [facility]    He continues to be active working on [employer] as a hobby. He tells me he has about 20 lawnmowers in his backyard which he works on   Spring Solis had an abnormal stress test in 2019 showing inferior ischemia but we continued medical management he is fairly stable without any significant angina concerns on Ranexa, Imdur and carvedilol  He is compliant with his meds blood sugars are generally well controlled before his bypass surgery his main symptoms are shortness of breath never had chest pain. He denies any chest pain or shortness of breath now  Reports \" benddopnea\"  gets short of breath on bending over or when he is walking up the garage door etc. Walks 30 mins every day at  [facility]       He had  CABG X4 after presenting with unstable angina in Sept 2016 which led to cardiac cath on 9/2/16 showing multivessel disease . His initial presentation was shortness of breath .  Wife says he snores a lot  HE did not want sleep apnea RX , He used to sleep in recliner but now in bed  He goes to [address] [place] [address] PCP once a year       Current Outpatient Medications   Medication Sig Dispense Refill    losartan (COZAAR) 50 MG tablet TAKE 1 TABLET BY MOUTH EVERY DAY 90 tablet 3    rosuvastatin (CRESTOR) 20 MG tablet Take 1 tablet by mouth every morning 90 tablet 3    ranolazine (RANEXA) 500 MG extended release tablet Take 1 tablet by mouth 2 times daily 180 tablet 3    nitroGLYCERIN (NITROSTAT) 0.4 MG SL tablet Place 1 tablet under the tongue every 5 minutes as needed for Chest pain 25 tablet 3    carvedilol (COREG) 6.25 MG tablet TAKE 1 TABLET BY MOUTH TWICE A  tablet 3    Multiple Vitamins-Minerals (THERAPEUTIC MULTIVITAMIN-MINERALS) tablet Take 1

## 2024-01-04 RX ORDER — CARVEDILOL 6.25 MG/1
TABLET ORAL
Qty: 180 TABLET | Refills: 3 | Status: SHIPPED | OUTPATIENT
Start: 2024-01-04

## 2024-01-15 RX ORDER — ROSUVASTATIN CALCIUM 20 MG/1
20 TABLET, COATED ORAL EVERY MORNING
Qty: 90 TABLET | Refills: 3 | Status: SHIPPED | OUTPATIENT
Start: 2024-01-15

## 2024-07-12 ENCOUNTER — OFFICE VISIT (OUTPATIENT)
Dept: CARDIOLOGY CLINIC | Age: 79
End: 2024-07-12
Payer: COMMERCIAL

## 2024-07-12 VITALS
HEIGHT: 71 IN | DIASTOLIC BLOOD PRESSURE: 88 MMHG | SYSTOLIC BLOOD PRESSURE: 136 MMHG | BODY MASS INDEX: 30.12 KG/M2 | WEIGHT: 215.14 LBS | HEART RATE: 87 BPM | OXYGEN SATURATION: 98 %

## 2024-07-12 DIAGNOSIS — G47.33 OSA (OBSTRUCTIVE SLEEP APNEA): ICD-10-CM

## 2024-07-12 DIAGNOSIS — Z95.1 S/P CABG X 4: ICD-10-CM

## 2024-07-12 DIAGNOSIS — E11.59 TYPE 2 DIABETES MELLITUS WITH OTHER CIRCULATORY COMPLICATION, UNSPECIFIED WHETHER LONG TERM INSULIN USE (HCC): ICD-10-CM

## 2024-07-12 DIAGNOSIS — I10 ESSENTIAL HYPERTENSION: ICD-10-CM

## 2024-07-12 DIAGNOSIS — E78.5 DYSLIPIDEMIA: ICD-10-CM

## 2024-07-12 DIAGNOSIS — I25.810 CORONARY ATHEROSCLEROSIS OF AUTOLOGOUS ARTERY BYPASS GRAFT WITHOUT ANGINA: Primary | ICD-10-CM

## 2024-07-12 PROCEDURE — 3075F SYST BP GE 130 - 139MM HG: CPT | Performed by: NURSE PRACTITIONER

## 2024-07-12 PROCEDURE — 99214 OFFICE O/P EST MOD 30 MIN: CPT | Performed by: NURSE PRACTITIONER

## 2024-07-12 PROCEDURE — 1123F ACP DISCUSS/DSCN MKR DOCD: CPT | Performed by: NURSE PRACTITIONER

## 2024-07-12 PROCEDURE — 3079F DIAST BP 80-89 MM HG: CPT | Performed by: NURSE PRACTITIONER

## 2024-07-12 ASSESSMENT — ENCOUNTER SYMPTOMS
COUGH: 0
SHORTNESS OF BREATH: 0

## 2024-07-27 ENCOUNTER — HOSPITAL ENCOUNTER (INPATIENT)
Age: 79
LOS: 3 days | Discharge: NURSING, REHAB OR LONG-TERM CARE | End: 2024-07-30
Attending: STUDENT IN AN ORGANIZED HEALTH CARE EDUCATION/TRAINING PROGRAM
Payer: MEDICARE

## 2024-07-27 ENCOUNTER — APPOINTMENT (OUTPATIENT)
Dept: GENERAL RADIOLOGY | Age: 79
End: 2024-07-27
Payer: MEDICARE

## 2024-07-27 ENCOUNTER — APPOINTMENT (OUTPATIENT)
Dept: CT IMAGING | Age: 79
End: 2024-07-27
Payer: MEDICARE

## 2024-07-27 ENCOUNTER — HOSPITAL ENCOUNTER (EMERGENCY)
Age: 79
Discharge: ANOTHER HOSPITAL | End: 2024-07-27
Attending: STUDENT IN AN ORGANIZED HEALTH CARE EDUCATION/TRAINING PROGRAM
Payer: MEDICARE

## 2024-07-27 VITALS
HEIGHT: 71 IN | HEART RATE: 75 BPM | BODY MASS INDEX: 29.82 KG/M2 | TEMPERATURE: 97.6 F | OXYGEN SATURATION: 100 % | WEIGHT: 213 LBS | SYSTOLIC BLOOD PRESSURE: 146 MMHG | RESPIRATION RATE: 14 BRPM | DIASTOLIC BLOOD PRESSURE: 86 MMHG

## 2024-07-27 DIAGNOSIS — I63.9 CEREBROVASCULAR ACCIDENT (CVA), UNSPECIFIED MECHANISM (HCC): Primary | ICD-10-CM

## 2024-07-27 DIAGNOSIS — I63.441 CEREBROVASCULAR ACCIDENT (CVA) DUE TO EMBOLISM OF RIGHT CEREBELLAR ARTERY (HCC): ICD-10-CM

## 2024-07-27 DIAGNOSIS — I63.9 ACUTE ISCHEMIC STROKE (HCC): Primary | ICD-10-CM

## 2024-07-27 LAB
ALBUMIN SERPL-MCNC: 4.3 GM/DL (ref 3.4–5)
ALP BLD-CCNC: 57 IU/L (ref 40–129)
ALT SERPL-CCNC: 20 U/L (ref 10–40)
ANION GAP SERPL CALCULATED.3IONS-SCNC: 18 MMOL/L (ref 7–16)
AST SERPL-CCNC: 24 IU/L (ref 15–37)
BASOPHILS ABSOLUTE: 0.1 K/CU MM
BASOPHILS RELATIVE PERCENT: 1 % (ref 0–1)
BILIRUB SERPL-MCNC: 0.6 MG/DL (ref 0–1)
BUN SERPL-MCNC: 19 MG/DL (ref 6–23)
CALCIUM SERPL-MCNC: 9.7 MG/DL (ref 8.3–10.6)
CHLORIDE BLD-SCNC: 98 MMOL/L (ref 99–110)
CO2: 22 MMOL/L (ref 21–32)
CREAT SERPL-MCNC: 1.3 MG/DL (ref 0.9–1.3)
DIFFERENTIAL TYPE: ABNORMAL
EOSINOPHILS ABSOLUTE: 0.2 K/CU MM
EOSINOPHILS RELATIVE PERCENT: 2.1 % (ref 0–3)
GFR, ESTIMATED: 57 ML/MIN/1.73M2
GLUCOSE BLD-MCNC: 192 MG/DL (ref 70–99)
GLUCOSE BLD-MCNC: 230 MG/DL (ref 70–99)
GLUCOSE SERPL-MCNC: 253 MG/DL (ref 70–99)
HCT VFR BLD CALC: 48.8 % (ref 42–52)
HEMOGLOBIN: 16.8 GM/DL (ref 13.5–18)
IMMATURE NEUTROPHIL %: 1.4 % (ref 0–0.43)
INR BLD: 1 INDEX
LYMPHOCYTES ABSOLUTE: 2.2 K/CU MM
LYMPHOCYTES RELATIVE PERCENT: 26.8 % (ref 24–44)
MAGNESIUM: 2.1 MG/DL (ref 1.8–2.4)
MCH RBC QN AUTO: 31.2 PG (ref 27–31)
MCHC RBC AUTO-ENTMCNC: 34.4 % (ref 32–36)
MCV RBC AUTO: 90.5 FL (ref 78–100)
MONOCYTES ABSOLUTE: 0.8 K/CU MM
MONOCYTES RELATIVE PERCENT: 9.5 % (ref 0–4)
NEUTROPHILS ABSOLUTE: 4.8 K/CU MM
NEUTROPHILS RELATIVE PERCENT: 59.2 % (ref 36–66)
PDW BLD-RTO: 12.2 % (ref 11.7–14.9)
PLATELET # BLD: 241 K/CU MM (ref 140–440)
PMV BLD AUTO: 9.9 FL (ref 7.5–11.1)
POTASSIUM SERPL-SCNC: 4 MMOL/L (ref 3.5–5.1)
PRO-BNP: 52.38 PG/ML
PROTHROMBIN TIME: 14 SECONDS (ref 11.7–14.5)
RBC # BLD: 5.39 M/CU MM (ref 4.6–6.2)
SODIUM BLD-SCNC: 138 MMOL/L (ref 135–145)
TOTAL IMMATURE NEUTOROPHIL: 0.11 K/CU MM
TOTAL PROTEIN: 8.6 GM/DL (ref 6.4–8.2)
TROPONIN, HIGH SENSITIVITY: <6 NG/L (ref 0–22)
TROPONIN, HIGH SENSITIVITY: <6 NG/L (ref 0–22)
WBC # BLD: 8.1 K/CU MM (ref 4–10.5)

## 2024-07-27 PROCEDURE — 83735 ASSAY OF MAGNESIUM: CPT

## 2024-07-27 PROCEDURE — 70450 CT HEAD/BRAIN W/O DYE: CPT

## 2024-07-27 PROCEDURE — 80053 COMPREHEN METABOLIC PANEL: CPT

## 2024-07-27 PROCEDURE — 82962 GLUCOSE BLOOD TEST: CPT

## 2024-07-27 PROCEDURE — 70498 CT ANGIOGRAPHY NECK: CPT

## 2024-07-27 PROCEDURE — 84484 ASSAY OF TROPONIN QUANT: CPT

## 2024-07-27 PROCEDURE — 85025 COMPLETE CBC W/AUTO DIFF WBC: CPT

## 2024-07-27 PROCEDURE — 6360000002 HC RX W HCPCS: Performed by: STUDENT IN AN ORGANIZED HEALTH CARE EDUCATION/TRAINING PROGRAM

## 2024-07-27 PROCEDURE — 2580000003 HC RX 258: Performed by: STUDENT IN AN ORGANIZED HEALTH CARE EDUCATION/TRAINING PROGRAM

## 2024-07-27 PROCEDURE — 6370000000 HC RX 637 (ALT 250 FOR IP): Performed by: STUDENT IN AN ORGANIZED HEALTH CARE EDUCATION/TRAINING PROGRAM

## 2024-07-27 PROCEDURE — 99285 EMERGENCY DEPT VISIT HI MDM: CPT

## 2024-07-27 PROCEDURE — 85610 PROTHROMBIN TIME: CPT

## 2024-07-27 PROCEDURE — 83880 ASSAY OF NATRIURETIC PEPTIDE: CPT

## 2024-07-27 PROCEDURE — 6360000002 HC RX W HCPCS

## 2024-07-27 PROCEDURE — 6360000004 HC RX CONTRAST MEDICATION: Performed by: STUDENT IN AN ORGANIZED HEALTH CARE EDUCATION/TRAINING PROGRAM

## 2024-07-27 PROCEDURE — 2000000000 HC ICU R&B

## 2024-07-27 PROCEDURE — 96374 THER/PROPH/DIAG INJ IV PUSH: CPT

## 2024-07-27 PROCEDURE — 96375 TX/PRO/DX INJ NEW DRUG ADDON: CPT

## 2024-07-27 PROCEDURE — 71045 X-RAY EXAM CHEST 1 VIEW: CPT

## 2024-07-27 RX ORDER — ASPIRIN 300 MG/1
300 SUPPOSITORY RECTAL DAILY
Status: DISCONTINUED | OUTPATIENT
Start: 2024-07-28 | End: 2024-07-30 | Stop reason: HOSPADM

## 2024-07-27 RX ORDER — SODIUM CHLORIDE 0.9 % (FLUSH) 0.9 %
5-40 SYRINGE (ML) INJECTION EVERY 12 HOURS SCHEDULED
Status: DISCONTINUED | OUTPATIENT
Start: 2024-07-27 | End: 2024-07-27 | Stop reason: HOSPADM

## 2024-07-27 RX ORDER — SODIUM CHLORIDE 9 MG/ML
INJECTION, SOLUTION INTRAVENOUS PRN
Status: DISCONTINUED | OUTPATIENT
Start: 2024-07-27 | End: 2024-07-27 | Stop reason: HOSPADM

## 2024-07-27 RX ORDER — INSULIN LISPRO 100 [IU]/ML
0-4 INJECTION, SOLUTION INTRAVENOUS; SUBCUTANEOUS
Status: DISCONTINUED | OUTPATIENT
Start: 2024-07-28 | End: 2024-07-29

## 2024-07-27 RX ORDER — M-VIT,TX,IRON,MINS/CALC/FOLIC 27MG-0.4MG
1 TABLET ORAL DAILY
Status: DISCONTINUED | OUTPATIENT
Start: 2024-07-28 | End: 2024-07-30 | Stop reason: HOSPADM

## 2024-07-27 RX ORDER — ASPIRIN 81 MG/1
81 TABLET, CHEWABLE ORAL DAILY
Status: DISCONTINUED | OUTPATIENT
Start: 2024-07-28 | End: 2024-07-30 | Stop reason: HOSPADM

## 2024-07-27 RX ORDER — ONDANSETRON 2 MG/ML
4 INJECTION INTRAMUSCULAR; INTRAVENOUS EVERY 6 HOURS PRN
Status: DISCONTINUED | OUTPATIENT
Start: 2024-07-27 | End: 2024-07-30 | Stop reason: HOSPADM

## 2024-07-27 RX ORDER — SODIUM CHLORIDE 0.9 % (FLUSH) 0.9 %
5-40 SYRINGE (ML) INJECTION PRN
Status: DISCONTINUED | OUTPATIENT
Start: 2024-07-27 | End: 2024-07-30 | Stop reason: HOSPADM

## 2024-07-27 RX ORDER — INSULIN LISPRO 100 [IU]/ML
0-4 INJECTION, SOLUTION INTRAVENOUS; SUBCUTANEOUS NIGHTLY
Status: DISCONTINUED | OUTPATIENT
Start: 2024-07-27 | End: 2024-07-29

## 2024-07-27 RX ORDER — SODIUM CHLORIDE 0.9 % (FLUSH) 0.9 %
10 SYRINGE (ML) INJECTION ONCE
Status: COMPLETED | OUTPATIENT
Start: 2024-07-27 | End: 2024-07-27

## 2024-07-27 RX ORDER — LANOLIN ALCOHOL/MO/W.PET/CERES
100 CREAM (GRAM) TOPICAL DAILY
Status: DISCONTINUED | OUTPATIENT
Start: 2024-07-28 | End: 2024-07-30 | Stop reason: HOSPADM

## 2024-07-27 RX ORDER — RANOLAZINE 500 MG/1
500 TABLET, EXTENDED RELEASE ORAL 2 TIMES DAILY
Status: DISCONTINUED | OUTPATIENT
Start: 2024-07-28 | End: 2024-07-30 | Stop reason: HOSPADM

## 2024-07-27 RX ORDER — LOSARTAN POTASSIUM 25 MG/1
25 TABLET ORAL DAILY
Status: DISCONTINUED | OUTPATIENT
Start: 2024-07-28 | End: 2024-07-30 | Stop reason: HOSPADM

## 2024-07-27 RX ORDER — POLYETHYLENE GLYCOL 3350 17 G/17G
17 POWDER, FOR SOLUTION ORAL DAILY PRN
Status: DISCONTINUED | OUTPATIENT
Start: 2024-07-27 | End: 2024-07-30 | Stop reason: HOSPADM

## 2024-07-27 RX ORDER — ONDANSETRON 4 MG/1
4 TABLET, ORALLY DISINTEGRATING ORAL EVERY 8 HOURS PRN
Status: DISCONTINUED | OUTPATIENT
Start: 2024-07-27 | End: 2024-07-30 | Stop reason: HOSPADM

## 2024-07-27 RX ORDER — ONDANSETRON 2 MG/ML
4 INJECTION INTRAMUSCULAR; INTRAVENOUS ONCE
Status: COMPLETED | OUTPATIENT
Start: 2024-07-27 | End: 2024-07-27

## 2024-07-27 RX ORDER — SODIUM CHLORIDE 0.9 % (FLUSH) 0.9 %
5-40 SYRINGE (ML) INJECTION EVERY 12 HOURS SCHEDULED
Status: DISCONTINUED | OUTPATIENT
Start: 2024-07-27 | End: 2024-07-30 | Stop reason: HOSPADM

## 2024-07-27 RX ORDER — SODIUM CHLORIDE 0.9 % (FLUSH) 0.9 %
5-40 SYRINGE (ML) INJECTION PRN
Status: DISCONTINUED | OUTPATIENT
Start: 2024-07-27 | End: 2024-07-27 | Stop reason: HOSPADM

## 2024-07-27 RX ORDER — 0.9 % SODIUM CHLORIDE 0.9 %
1000 INTRAVENOUS SOLUTION INTRAVENOUS ONCE
Status: COMPLETED | OUTPATIENT
Start: 2024-07-27 | End: 2024-07-27

## 2024-07-27 RX ORDER — SODIUM CHLORIDE 9 MG/ML
INJECTION, SOLUTION INTRAVENOUS PRN
Status: DISCONTINUED | OUTPATIENT
Start: 2024-07-27 | End: 2024-07-30 | Stop reason: HOSPADM

## 2024-07-27 RX ORDER — CARVEDILOL 6.25 MG/1
6.25 TABLET ORAL 2 TIMES DAILY WITH MEALS
Status: DISCONTINUED | OUTPATIENT
Start: 2024-07-28 | End: 2024-07-30 | Stop reason: HOSPADM

## 2024-07-27 RX ORDER — ATORVASTATIN CALCIUM 40 MG/1
80 TABLET, FILM COATED ORAL NIGHTLY
Status: DISCONTINUED | OUTPATIENT
Start: 2024-07-27 | End: 2024-07-28

## 2024-07-27 RX ORDER — SODIUM CHLORIDE 0.9 % (FLUSH) 0.9 %
10 SYRINGE (ML) INJECTION ONCE
Status: DISCONTINUED | OUTPATIENT
Start: 2024-07-27 | End: 2024-07-27 | Stop reason: HOSPADM

## 2024-07-27 RX ORDER — DEXTROSE MONOHYDRATE 100 MG/ML
INJECTION, SOLUTION INTRAVENOUS CONTINUOUS PRN
Status: DISCONTINUED | OUTPATIENT
Start: 2024-07-27 | End: 2024-07-30 | Stop reason: HOSPADM

## 2024-07-27 RX ORDER — GLUCAGON 1 MG/ML
1 KIT INJECTION PRN
Status: DISCONTINUED | OUTPATIENT
Start: 2024-07-27 | End: 2024-07-30 | Stop reason: HOSPADM

## 2024-07-27 RX ORDER — ZINC SULFATE 50(220)MG
50 CAPSULE ORAL DAILY
Status: DISCONTINUED | OUTPATIENT
Start: 2024-07-28 | End: 2024-07-30 | Stop reason: HOSPADM

## 2024-07-27 RX ORDER — ENOXAPARIN SODIUM 100 MG/ML
40 INJECTION SUBCUTANEOUS DAILY
Status: DISCONTINUED | OUTPATIENT
Start: 2024-07-28 | End: 2024-07-30 | Stop reason: HOSPADM

## 2024-07-27 RX ORDER — FOLIC ACID 1 MG/1
1 TABLET ORAL DAILY
Status: DISCONTINUED | OUTPATIENT
Start: 2024-07-28 | End: 2024-07-30 | Stop reason: HOSPADM

## 2024-07-27 RX ADMIN — SODIUM CHLORIDE, PRESERVATIVE FREE 10 ML: 5 INJECTION INTRAVENOUS at 21:38

## 2024-07-27 RX ADMIN — Medication 24 MG: at 18:07

## 2024-07-27 RX ADMIN — ONDANSETRON 4 MG: 2 INJECTION INTRAMUSCULAR; INTRAVENOUS at 18:17

## 2024-07-27 RX ADMIN — SODIUM CHLORIDE 1000 ML: 9 INJECTION, SOLUTION INTRAVENOUS at 18:16

## 2024-07-27 RX ADMIN — ATORVASTATIN CALCIUM 80 MG: 40 TABLET, FILM COATED ORAL at 21:37

## 2024-07-27 RX ADMIN — IOPAMIDOL 75 ML: 755 INJECTION, SOLUTION INTRAVENOUS at 17:38

## 2024-07-27 RX ADMIN — SODIUM CHLORIDE, PRESERVATIVE FREE 10 ML: 5 INJECTION INTRAVENOUS at 18:05

## 2024-07-27 ASSESSMENT — LIFESTYLE VARIABLES
HOW MANY STANDARD DRINKS CONTAINING ALCOHOL DO YOU HAVE ON A TYPICAL DAY: PATIENT DOES NOT DRINK
HOW OFTEN DO YOU HAVE A DRINK CONTAINING ALCOHOL: NEVER

## 2024-07-27 ASSESSMENT — PAIN - FUNCTIONAL ASSESSMENT
PAIN_FUNCTIONAL_ASSESSMENT: NONE - DENIES PAIN

## 2024-07-27 NOTE — ED PROVIDER NOTES
identified.      Electronically signed by [employer]os[employer]      CT HEAD WO CONTRAST   Final Result   1.No acute intracranial abnormality.                     Electronically signed by [employer]          LABS: (none if blank)  Labs Reviewed   CBC WITH AUTO DIFFERENTIAL - Abnormal; Notable for the following components:       Result Value    MCH 31.2 (*)     Monocytes % 9.5 (*)     Immature Neutrophil % 1.4 (*)     All other components within normal limits   COMPREHENSIVE METABOLIC PANEL W/ REFLEX TO MG FOR LOW K - Abnormal; Notable for the following components:    Chloride 98 (*)     Anion Gap 18 (*)     Glucose 253 (*)     Est, Glom Filt Rate 57 (*)     Total Protein 8.6 (*)     All other components within normal limits   POCT GLUCOSE - Abnormal; Notable for the following components:    POC Glucose 230 (*)     All other components within normal limits   PROTIME-INR   TROPONIN   MAGNESIUM   BRAIN NATRIURETIC PEPTIDE   TROPONIN   URINALYSIS   URINE DRUG SCREEN   POCT GLUCOSE   POCT GLUCOSE       FINAL IMPRESSION      Final diagnoses:   Cerebrovascular accident (CVA), unspecified mechanism (HCC)     Condition: serious  Dispo: Transfer      This transcription was electronically signed. Parts of this transcriptions may have been dictated by use of voice recognition software and electronically transcribed, and parts may have been transcribed with the assistance of an ED scribe and may contain errors related to that system including errors in grammar, punctuation, and spelling, as well as words and phrases that may be inappropriate.  The transcription may contain errors not detected in proofreading.  Efforts were made to edit the dictations.    Electronically Signed: Thanh Davey MD, 07/27/24, 6:54 PM    I am the Primary Clinician of Record.      Clinical Impression:  1. Cerebrovascular accident (CVA), unspecified mechanism (HCC)      Disposition referral (if applicable):  No follow-up provider specified.  Disposition  medications (if applicable):  New Prescriptions    No medications on file     ED Provider Disposition Time  DISPOSITION Decision To Transfer 07/27/2024 06:06:28 PM            Thanh Davey MD  07/27/24 [id number]

## 2024-07-27 NOTE — H&P
History and Physical 24        NAME: Franko Odom  : 1945  MRN: [id number]      Assessment/Plan:  Franko Odom is a 78 y.o. male with a history of hypertension, hyper lipidemia, diabetes, CAD, CABG who presented to [facility] 2024 with strokelike symptoms, status post TNK.  Admitted to the ICU post management.      Problem list  Strokelike symptoms  Dizziness  CAD  Diabetes  Hypertension  Hyperlipidemia  GERD        Neuro: Presented with new onset dizziness, blurred/double vision, and balance issues associated with 1 episode of nonbilious none bloody emesis while in the waiting room.  Last known well less than 4 hours prior to arrival.  NIH stroke scale on admission 1, status post TNK.  CT head, CTA head and neck negative for any acute findings.  Every hour neurochecks.  Repeat CT MRI in the a.m.  Neurology consulted, recs appreciated.  Cardio: Known hypertensive, CAD, hemodynamically stable.  Monitor vitals adjust as needed.  SBP goal less than 180/110 mmHg.  Resp: On room air.  Continue inhalers and nebs as needed.  Continue pulmonary toileting.  GI: Advance diet as tolerated, GI prophylaxis.  : Creatinine of 1.3, unknown baseline.  Monitor urine output, monitor electrolytes and replenish as needed.  Heme: Hemoglobin of 16.8, platelets 241.  DVT prophylaxis SCDs for the first 24 hours, transition to Lovenox thereafter.  ID: WBC of 8.1.  Noninfectious.  Monitor off antibiotics and start if clinically warranted.  Endo: Known diabetic.  POC BG ISS as needed.  Blood sugar goal 140 to 180 mg/dL.  MSK: DTI prevention, PT/OT.    Tubes/Lines/Drains: PIV    Code Status: Full         Chief Complaint:    Dizziness    History of Present Illness:    78-year-old male with multiple comorbidities who presented to the emergency department with new onset dizziness, blurred vision, emesis.  Stated symptoms started about 2 to 3 hours prior to arrival.  Sudden onset.  He otherwise denies any fevers,  cavernous ICA segment unremarkable. Left M1, M2 segments and their branches unremarkable. Left A1 and A2 segments unremarkable. CTA NECK: The cervical segments of the vertebral arteries are unremarkable Right CCA unremarkable. Minimal atherosclerotic change of the proximal right ICA. Left CCA unremarkable. Minimal atherosclerotic changes of the proximal left ICA. Soft tissues are unremarkable. There is a large probable calcified granuloma in the left lung apex. Sternotomy changes are noted in the visualized sternum. There are no sclerotic or lytic lesions identified.     No critical stenosis, aneurysm or occlusion identified. Electronically signed by [employer]    CT HEAD WO CONTRAST    Result Date: 7/27/2024  EXAMINATION: CT CODE NEURO HEAD WO CONTRAST, 7/27/2024 5:36 PM HISTORY: Stroke Symptoms COMPARISON:  No comparisons available.  Technique: Axial images obtained without contrast. One or more of the following dose reduction techniques were used: automated exposure control, adjustment of the mA and/or kV according to patient size, use of iterative reconstruction technique. Findings: No acute infarct or parenchymal hemorrhage. No abnormal mass or mass effect. No midline shift. No extra-axial fluid collections. No hydrocephalus. Mastoid air cells unremarkable. Sinuses and orbits unremarkable. No acute fracture. No significant facial or scalp soft tissue swelling evident. No radiopaque foreign body is seen.     1.No acute intracranial abnormality. Electronically signed by [employer]    XR CHEST PORTABLE    Result Date: 7/27/2024  EXAMINATION: XR CHEST PORTABLE, 7/27/2024 5:46 PM HISTORY: stroke symptoms COMPARISON:  No comparisons available.  Technique: Single view. Findings: The lungs are clear, no effusion. No pneumothorax. Heart is normal size. Mediastinal and hilar contours are within normal limits. Poststernotomy     No acute cardiopulmonary abnormality. Electronically signed by [employer]      CBC:   Recent

## 2024-07-27 NOTE — ED NOTES
[place] Supervisor Estrada MONSIVAIS notified of stroke patient being sent over to [facility]. Per Estrada, plenty of beds available once he receives bed request.

## 2024-07-28 ENCOUNTER — APPOINTMENT (OUTPATIENT)
Dept: MRI IMAGING | Age: 79
End: 2024-07-28
Attending: STUDENT IN AN ORGANIZED HEALTH CARE EDUCATION/TRAINING PROGRAM
Payer: MEDICARE

## 2024-07-28 PROBLEM — I63.9 STROKE DETERMINED BY CLINICAL ASSESSMENT (HCC): Status: ACTIVE | Noted: 2024-07-28

## 2024-07-28 LAB
ASPIRIN: 566 ARU
CHOLEST SERPL-MCNC: 140 MG/DL
ESTIMATED AVERAGE GLUCOSE: 169 MG/DL
GLUCOSE BLD-MCNC: 160 MG/DL (ref 70–99)
GLUCOSE BLD-MCNC: 162 MG/DL (ref 70–99)
GLUCOSE BLD-MCNC: 168 MG/DL (ref 70–99)
GLUCOSE BLD-MCNC: 181 MG/DL (ref 70–99)
GLUCOSE BLD-MCNC: 222 MG/DL (ref 70–99)
HBA1C MFR BLD: 7.5 % (ref 4.2–6.3)
HDLC SERPL-MCNC: 40 MG/DL
LDLC SERPL CALC-MCNC: 86 MG/DL
REASON FOR REJECTION: NORMAL
REJECTED TEST: NORMAL
TRIGL SERPL-MCNC: 72 MG/DL

## 2024-07-28 PROCEDURE — 70551 MRI BRAIN STEM W/O DYE: CPT

## 2024-07-28 PROCEDURE — 97166 OT EVAL MOD COMPLEX 45 MIN: CPT

## 2024-07-28 PROCEDURE — 6370000000 HC RX 637 (ALT 250 FOR IP): Performed by: NURSE PRACTITIONER

## 2024-07-28 PROCEDURE — 82962 GLUCOSE BLOOD TEST: CPT

## 2024-07-28 PROCEDURE — 6360000002 HC RX W HCPCS: Performed by: STUDENT IN AN ORGANIZED HEALTH CARE EDUCATION/TRAINING PROGRAM

## 2024-07-28 PROCEDURE — 94761 N-INVAS EAR/PLS OXIMETRY MLT: CPT

## 2024-07-28 PROCEDURE — 83036 HEMOGLOBIN GLYCOSYLATED A1C: CPT

## 2024-07-28 PROCEDURE — 80061 LIPID PANEL: CPT

## 2024-07-28 PROCEDURE — 97530 THERAPEUTIC ACTIVITIES: CPT

## 2024-07-28 PROCEDURE — 94150 VITAL CAPACITY TEST: CPT

## 2024-07-28 PROCEDURE — 97112 NEUROMUSCULAR REEDUCATION: CPT

## 2024-07-28 PROCEDURE — 2700000000 HC OXYGEN THERAPY PER DAY

## 2024-07-28 PROCEDURE — 97116 GAIT TRAINING THERAPY: CPT

## 2024-07-28 PROCEDURE — 6370000000 HC RX 637 (ALT 250 FOR IP): Performed by: STUDENT IN AN ORGANIZED HEALTH CARE EDUCATION/TRAINING PROGRAM

## 2024-07-28 PROCEDURE — 97163 PT EVAL HIGH COMPLEX 45 MIN: CPT

## 2024-07-28 PROCEDURE — 2580000003 HC RX 258: Performed by: STUDENT IN AN ORGANIZED HEALTH CARE EDUCATION/TRAINING PROGRAM

## 2024-07-28 PROCEDURE — 2000000000 HC ICU R&B

## 2024-07-28 PROCEDURE — 99223 1ST HOSP IP/OBS HIGH 75: CPT | Performed by: STUDENT IN AN ORGANIZED HEALTH CARE EDUCATION/TRAINING PROGRAM

## 2024-07-28 PROCEDURE — 85576 BLOOD PLATELET AGGREGATION: CPT

## 2024-07-28 RX ORDER — CLOPIDOGREL BISULFATE 75 MG/1
75 TABLET ORAL DAILY
Status: DISCONTINUED | OUTPATIENT
Start: 2024-07-28 | End: 2024-07-30 | Stop reason: HOSPADM

## 2024-07-28 RX ORDER — ROSUVASTATIN CALCIUM 20 MG/1
40 TABLET, COATED ORAL NIGHTLY
Status: DISCONTINUED | OUTPATIENT
Start: 2024-07-28 | End: 2024-07-30 | Stop reason: HOSPADM

## 2024-07-28 RX ADMIN — ROSUVASTATIN CALCIUM 40 MG: 20 TABLET, FILM COATED ORAL at 21:16

## 2024-07-28 RX ADMIN — CARVEDILOL 6.25 MG: 6.25 TABLET, FILM COATED ORAL at 08:51

## 2024-07-28 RX ADMIN — Medication 100 MG: at 08:51

## 2024-07-28 RX ADMIN — ENOXAPARIN SODIUM 40 MG: 100 INJECTION SUBCUTANEOUS at 21:16

## 2024-07-28 RX ADMIN — SODIUM CHLORIDE, PRESERVATIVE FREE 10 ML: 5 INJECTION INTRAVENOUS at 21:34

## 2024-07-28 RX ADMIN — CLOPIDOGREL BISULFATE 75 MG: 75 TABLET ORAL at 21:19

## 2024-07-28 RX ADMIN — ZINC SULFATE 220 MG (50 MG) CAPSULE 50 MG: CAPSULE at 08:51

## 2024-07-28 RX ADMIN — LOSARTAN POTASSIUM 25 MG: 25 TABLET, FILM COATED ORAL at 08:51

## 2024-07-28 RX ADMIN — FOLIC ACID 1 MG: 1 TABLET ORAL at 08:51

## 2024-07-28 RX ADMIN — CARVEDILOL 6.25 MG: 6.25 TABLET, FILM COATED ORAL at 17:50

## 2024-07-28 RX ADMIN — SODIUM CHLORIDE, PRESERVATIVE FREE 10 ML: 5 INJECTION INTRAVENOUS at 08:51

## 2024-07-28 RX ADMIN — RANOLAZINE 500 MG: 500 TABLET, EXTENDED RELEASE ORAL at 08:50

## 2024-07-28 RX ADMIN — Medication 1 TABLET: at 08:51

## 2024-07-28 RX ADMIN — ASPIRIN 81 MG 81 MG: 81 TABLET ORAL at 21:16

## 2024-07-28 RX ADMIN — RANOLAZINE 500 MG: 500 TABLET, EXTENDED RELEASE ORAL at 21:16

## 2024-07-28 NOTE — CONSULTS
Neurology Service Consult Note  [facility]   Patient Name: Franko Odom  : 1945        Subjective:   Reason for consult:   78 y.o. -male presenting to [facility] from [facility]. He is alert and oriented and able to give most of the history other history obtained through chart review. This is a pleasant 77 y/o male with prior medical history of Arthritis, CAD with Cabg, HLD, HTN, DM, chronic back pain and GERD. He tells me he was at the [employer] yesterday drinking a coke. He went to the restroom had a bowel movement and when he came back around 445 pm he was having difficulty walking and double vision. He states he sat down and when he went to get up felt like he was going to fall. He noticed when watching TV everything was double. He still has double vision today and when he covers one eye double vision is corrected. He was given TNK at [facility] last night and transferred to [facility].     On exam today he has some right sided dysmetria and left sided nystagmus and continues to endorse diplopia. He had rotary nystagmus last night. Otherwise he is starting to feel better. He denies a headache, dizziness, dysarthria, unilateral weakness, palpitations, chest pain or sob.       On antiplatelet aspirin 81 mg and Crestor 20 mg prior to admission. Hemoglobin A1 C 7.5, and LDL 86, HDL 40.       He denies fever, chills, cephalagia, unilateral weakness, dizziness, palpitations, chest pain or sob.     Past Medical History:   Diagnosis Date    Acid reflux     Anemia     Arthritis     \"Wrists, Knees, Ankles And Spine\"    Belching     \"All The Time\"    CAD (coronary artery disease)     Sees Dr. Tara Cotter    Chronic back pain     Diabetes mellitus (HCC) Dx 's    \"I Go To [facility] For Diabetic Care, I See Georgia Toscano CNP\"    Difficult intubation     Diverticulitis Dx Early 's    Echocardiogram 2019    EF 55-60%, Grade 1 diastolic dysfunction, No signifiant  and recent imaging, obtaining history, examining the patient, and and documentation.

## 2024-07-28 NOTE — PROGRESS NOTES
V2.0    [facility] Progress Note      Name:  Franko Odom /Age/Sex: 1945  (78 y.o. male)   MRN & CSN:  [id number] & [id number] Encounter Date/Time: 2024 12:38 PM EDT   Location:  -A PCP: Ariel Meyer MD     Attending:Hellen Hernandez MD       Hospital Day: 2    Assessment and Recommendations   Franko Odom is a 78 y.o. male with pmh of hypertension, hyper lipidemia, diabetes, CAD status post CABG  who presents with Acute ischemic stroke (HCC)      # Dizziness /strokelike symptoms status post TNK: Presented with  dizziness, blurred/double vision, and balance issues associated with 1 episode of nonbilious none bloody emesis while in the waiting room.  Last known well less than 4 hours prior to arrival.  NIH stroke scale on admission 1,   CT head, CTA head and neck negative for any acute findings, status post TNK, admitted in ICU for every hour neurochecks.  Repeat CT, MRI.  Neurology consulted, pending evaluation.  Continue management per CCM.    # CAD s/p CABG: Hemodynamically stable,  # Diabetes: A1c 7.5% on 2024, continue sliding scale insulin, hypoglycemic protocol and diabetic diet  # Hypertension: On losartan and Coreg continue  # Hyperlipidemia: On rosuvastatin continue  # GERD: On PPI continue     Comment: Please note this report has been produced using speech recognition software and may contain errors related to that system including errors in grammar, punctuation, and spelling, as well as words and phrases that may be inappropriate. If there are any questions or concerns please feel free to contact the dictating provider for clarification.       Diet ADULT DIET; Regular; 4 carb choices (60 gm/meal)   DVT Prophylaxis [x] Lovenox, []  Heparin, [] SCDs, [] Ambulation,  [] Eliquis, [] Xarelto  [] Coumadin   Code Status Full Code   Disposition From: Home  Expected Disposition: Home versus SNF versus ARU  Estimated Date of Discharge: TBD  Patient requires continued

## 2024-07-28 NOTE — PROGRESS NOTES
ADL/Self-care training, IADL training, Cognitive/Perceptual training, Endurance training, Wheelchair mobility training, Gait training, Stair training, Neuromuscular re-education, Pain management, Positioning, Equipment evaluation, education, & procurement, Patient/Caregiver education & training, Safety education & training, Home exercise program, Therapeutic activities  Safety Devices  Type of Devices: All fall risk precautions in place, Patient at risk for falls, Left in chair, Call light within reach, Chair alarm in place, Nurse notified, Gait belt     Restrictions  Restrictions/Precautions  Restrictions/Precautions: General Precautions, Fall Risk     Subjective   General  Chart Reviewed: Yes  Patient assessed for rehabilitation services?: Yes  Family / Caregiver Present: Yes  Follows Commands: Within Functional Limits  Subjective  Subjective: pain: denies; 0/10         Social/Functional History  Social/Functional History  Lives With: Spouse  Type of Home: House  Home Layout: One level  Home Access: Stairs to enter with rails  Entrance Stairs - Number of Steps: 13  Bathroom Shower/Tub: Walk-in shower  Bathroom Toilet: Standard  Bathroom Equipment: Grab bars in shower  Home Equipment: Cane, Walker - Rolling  ADL Assistance: Independent  Ambulation Assistance: Independent  Transfer Assistance: Independent  Active : Yes  Vision/Hearing  Vision  Vision: Impaired (intermittent double/blurry vision but patient reports that it is improving)  Hearing  Hearing: Within functional limits    Cognition   Orientation  Overall Orientation Status: Within Functional Limits  Cognition  Overall Cognitive Status: Exceptions  Arousal/Alertness: Appears intact  Following Commands: Appears intact  Attention Span: Appears intact  Safety Judgement: Decreased awareness of need for safety;Decreased awareness of need for assistance  Problem Solving: Impaired  Insights: Impaired  Initiation: Impaired  Sequencing: Impaired     Objective  placement, awareness, and balance.    Activities performed today included bed mobility training, sup-sit, sit-stand, SPT.      AM[facility] - Mobility    AM-[facility] Basic Mobility - Inpatient   How much help is needed turning from your back to your side while in a flat bed without using bedrails?: A Lot  How much help is needed moving from lying on your back to sitting on the side of a flat bed without using bedrails?: A Lot  How much help is needed moving to and from a bed to a chair?: A Lot  How much help is needed standing up from a chair using your arms?: A Lot  How much help is needed walking in hospital room?: A Lot  How much help is needed climbing 3-5 steps with a railing?: Total  AM-PAC Inpatient Mobility Raw Score : 11  AM-PAC Inpatient T-Scale Score : 33.86  Mobility Inpatient CMS 0-100% Score: 72.57  Mobility Inpatient CMS G-Code Modifier : CL             Goals  Long Term Goals  Time Frame for Long Term Goals : In one week:  Long Term Goal 1: Pt will complete all bed mobility with SBAx1  Long Term Goal 2: Pt will complete sit <> stand transfers with CGAx1  Long Term Goal 3: Pt will ambulate 100 feet with minAx1 with LRAD (chair follow for safety at this time)  Long Term Goal 4: Pt will ascend/descend 6 steps with a handrail with modAx2  Long Term Goal 5: Pt will independently complete 3 sets of 10 reps of BLE AROM exercises       Education  Patient Education  Education Given To: Patient  Education Provided: Role of Therapy;IADL Safety;ADL Adaptive Strategies;Transfer Training;Fall Prevention Strategies;Energy Conservation;Equipment;Family Education;Plan of Care  Education Method: Demonstration;Verbal  Education Outcome: Demonstrated understanding;Verbalized understanding;Continued education needed      Time In: 1103  Time Out: 1138  Total Treatment Time: 35  Timed Code Treatment Minutes: 25        Dimitris Celaya, PT, DPT  License #: [id number]

## 2024-07-28 NOTE — PROGRESS NOTES
4 Eyes Skin Assessment     NAME:  Franko Odom  YOB: 1945  MEDICAL RECORD NUMBER:  [id number]    The patient is being assessed for  Admission    I agree that at least one RN has performed a thorough Head to Toe Skin Assessment on the patient. ALL assessment sites listed below have been assessed.      Areas assessed by both nurses:    Head, Face, Ears, Shoulders, Back, Chest, Arms, Elbows, Hands, Sacrum. Buttock, Coccyx, Ischium, Legs. Feet and Heels, and Under Medical Devices         Does the Patient have a Wound? No noted wound(s)       Joseph Prevention initiated by RN: Yes  Wound Care Orders initiated by RN: No    Pressure Injury (Stage 3,4, Unstageable, DTI, NWPT, and Complex wounds) if present, place Wound referral order by RN under : No    New Ostomies, if present place, Ostomy referral order under : No     Nurse 1 eSignature: Electronically signed by Nanci Baron RN on 7/27/24 at 10:11 PM EDT    **SHARE this note so that the co-signing nurse can place an eSignature**    Nurse 2 eSignature: Electronically signed by Kevin Barrett RN on 7/28/24 at 1:14 AM EDT

## 2024-07-28 NOTE — PROGRESS NOTES
V2.0  [facility] Hospital[facility] Progress Note      Name:  Franko Odom /Age/Sex: 1945  (78 y.o. male)   MRN & CSN:  [id number] & [id number] Encounter Date/Time: 2024 11:02 AM EDT    Location:  -A PCP: Ariel Meyer MD       Hospital Day: 2    Assessment and Plan:   Franko Odom is a 78 y.o. male with pmh of   hypertension, hyperlipidemia, diabetes, CAD, CABG who presented to [facility] 2024 with strokelike symptoms, status post TNK.  Admitted to the ICU post management.     Plan:  Strokelike symptoms s/p TNK ~1807 hrs   Dizziness, ataxia, vomiting, blurry vision  CAD s/p CABG  Diabetes-2  Hypertension  Hyperlipidemia  GERD    Neuro: Presented with new onset dizziness, blurred/double vision, and balance issues associated with 1 episode of nonbilious non bloody emesis while in the waiting room.  Last known well less than 4 hours prior to arrival.  NIH stroke scale on admission 1, status post TNK at 1807 hrs on ,  CT head, CTA head and neck negative for any acute findings.  Every hour neurochecks per protocol for the first 24 hrs,   Repeat CT and  MRI in the a.m.  Neurology consulted, recs appreciated.  Stroke secondary prevention, lipid control, on statin, mainatin euglycemia, aspirin+plavix x 21 days  Cardio: blood pressure goal < 180/105 mm of hg per post TNK protocol, Known hypertensive, CAD, hemodynamically stable.  Monitor vitals adjust as needed.  SBP goal less than 180/110 mmHg.  Resp: On room air.  Continue inhalers and nebs as needed.  Continue pulmonary toileting, maintain O2 sats > 94%  GI: Advance diet as tolerated, GI prophylaxis.  : Creatinine of 1.3, unknown baseline.  Monitor urine output, monitor electrolytes and replenish as needed.  Heme: Hemoglobin of 16.8, platelets 241k.  DVT prophylaxis SCDs for the first 24 hours, transition to Lovenox thereafter.  ID: WBC of 8.1.  Noninfectious.  Monitor off antibiotics and start if clinically warranted.  Endo: Known    SPECIMEN REJECTION    Collection Time: 07/28/24  4:30 AM   Result Value Ref Range    Rejected Test A1C     Reason for Rejection DUPLICATE ORDER    Hemoglobin A1C    Collection Time: 07/28/24  8:30 AM   Result Value Ref Range    Hemoglobin A1C 7.5 (H) 4.2 - 6.3 %    Estimated Avg Glucose 169 mg/dL        Imaging/Diagnostics Last 24 Hours   CTA HEAD NECK W CONTRAST    Result Date: 7/27/2024  BLAKE LOWE EXAMINATION:  CTA CODE NEURO HEAD AND NECK W CONT COMPARISON:  None  HISTORY: Stroke Symptoms TECHNIQUE: Axial images were obtained  post administration of IV contrast. 3-D MIP reconstruction images  were obtained on an independent workstation using CT angiography protocol. CT scan performed  using dose optimization techniques including the following automated exposure control; adjustment of mA and/or kV; use of iterative reconstruction technique.  Automatic exposure control was used to reduce radiation dose. Permanent radiation dose record is archived to PACS. FINDINGS: CTA brain: The basilar artery is unremarkable. The right posterior cerebral artery is unremarkable. The left posterior cerebral artery is unremarkable. Right cavernous ICA unremarkable. Right M1, M2 segments and their branches unremarkable. Right A1 and A2 segments unremarkable. Left cavernous ICA segment unremarkable. Left M1, M2 segments and their branches unremarkable. Left A1 and A2 segments unremarkable. CTA NECK: The cervical segments of the vertebral arteries are unremarkable Right CCA unremarkable. Minimal atherosclerotic change of the proximal right ICA. Left CCA unremarkable. Minimal atherosclerotic changes of the proximal left ICA. Soft tissues are unremarkable. There is a large probable calcified granuloma in the left lung apex. Sternotomy changes are noted in the visualized sternum. There are no sclerotic or lytic lesions identified.     No critical stenosis, aneurysm or occlusion identified. Electronically signed by Dionisio

## 2024-07-28 NOTE — PROGRESS NOTES
Patient arrived from [facility] ED  Alert and oriented  On oxygen inhalation at 2LPM via nasal cannula  Peripheral IV on left antecubital  Status post thrombolysis (TNK)  Still feeling lightheaded  On bedrest for 8 hours post thrombolysis

## 2024-07-28 NOTE — PROGRESS NOTES
Occupational Therapy  [facility] ACUTE CARE OCCUPATIONAL THERAPY EVALUATION    Franko Odom, 1945, 2121/2121-A, 7/28/2024    Discharge Recommendation: Encourage facility for intensive rehabilitation, anticipate 3 hours per day and 5 days per week.        History:  [place]:  There were no encounter diagnoses.  Past Medical History:   Diagnosis Date    Acid reflux     Anemia     Arthritis     \"Wrists, Knees, Ankles And Spine\"    Belching     \"All The Time\"    CAD (coronary artery disease)     Sees Dr. Tara Cotter    Chronic back pain     Diabetes mellitus (HCC) Dx 1990's    \"I Go To [facility] For Diabetic Care, I See Georgia Toscano CNP\"    Difficult intubation     Diverticulitis Dx Early 1970's    Echocardiogram 01/22/2019    EF 55-60%, Grade 1 diastolic dysfunction, No signifiant valvular disease note. No pericardial effusion.    H/O echocardiogram 07/25/2016    The left ventricular size is normal. There is mild concentric LVH. Trace to mild MR     H/O exercise stress test 07/25/2016    Patient tolerated well without focal complaints. Heart rate response Appropriate. Normal findings.     H/O percutaneous left heart catheterization 09/02/2016    LAD 80%, Cx 80%, RCA 50% prox, PDA 90% mid    Hyperlipidemia     Hypertension     Lexiscan stress test 01/22/2019    EF 71%, Moderate size medium inferior ischemia. 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    [place] to LAD & D1, SVG PDA & Cx M1    Shortness of breath on exertion     Wears glasses          Subjective:  Patient states: \"I'm pretty wobbly\"  Pain:  denies  Communication with other providers: co-eval w/ PT, handoff to RN  Restrictions: General Precautions, Fall Risk    Home Setup/Prior level of function:  Social/Functional History  Lives With: Spouse  Type of Home: House  Home Layout: One level  Home Access: Stairs to enter with rails  Entrance Stairs - Number of Steps:  functional deficits. Pt would benefit from continued acute care OT services w/ discharge to facility for intensive rehabilitation, anticipate 3 hours per day and 5 days per week.      Complexity: Moderate  Prognosis: Good, no significant barriers to participation at this time.   Occupational Therapy Plan  Times Per Week: 4+         Goals:  Pt will complete all aspects of bed mobility for EOB/OOB ADLs w/ mod I.  Pt will complete UB ADLs w/ mod I.  Pt will complete LB ADLs w/ mod I.  Pt will complete all functional transfers to and from bed, chair, toilet, shower chair w/ mod I.  Pt will ambulate functional household distance w/ mod I.  Pt will complete all aspects of toileting task w/ mod I.  Pt will perform therex/theract in order to increase strength and functional activity tolerance necessary for increased independence w/ ADL routine.    Pt goal: go home, get stronger  Time Frame for STGs: discharge    Equipment: Continue to assess at next LOC    Time:   Time in: 1103  Time out: 1138  Total time: 35  Timed treatment minutes: 25        Electronically signed by:      ESTEPHANIA Steven/CASTRO  [id number]

## 2024-07-29 ENCOUNTER — TELEPHONE (OUTPATIENT)
Age: 79
End: 2024-07-29

## 2024-07-29 LAB
ANION GAP SERPL CALCULATED.3IONS-SCNC: 16 MMOL/L (ref 7–16)
BASOPHILS ABSOLUTE: 0.1 K/CU MM
BASOPHILS RELATIVE PERCENT: 0.9 % (ref 0–1)
BUN SERPL-MCNC: 24 MG/DL (ref 6–23)
CALCIUM IONIZED: NORMAL MMOL/L (ref 1.12–1.32)
CALCIUM SERPL-MCNC: 9.3 MG/DL (ref 8.3–10.6)
CHLORIDE BLD-SCNC: 102 MMOL/L (ref 99–110)
CO2: 21 MMOL/L (ref 21–32)
CREAT SERPL-MCNC: 0.9 MG/DL (ref 0.9–1.3)
DIFFERENTIAL TYPE: ABNORMAL
EOSINOPHILS ABSOLUTE: 0.1 K/CU MM
EOSINOPHILS RELATIVE PERCENT: 1.4 % (ref 0–3)
GFR, ESTIMATED: 88 ML/MIN/1.73M2
GLUCOSE BLD-MCNC: 201 MG/DL (ref 70–99)
GLUCOSE BLD-MCNC: 240 MG/DL (ref 70–99)
GLUCOSE BLD-MCNC: 252 MG/DL (ref 70–99)
GLUCOSE SERPL-MCNC: 169 MG/DL (ref 70–99)
HCT VFR BLD CALC: 45.9 % (ref 42–52)
HEMOGLOBIN: 15.4 GM/DL (ref 13.5–18)
IMMATURE NEUTROPHIL %: 0.7 % (ref 0–0.43)
LYMPHOCYTES ABSOLUTE: 2.4 K/CU MM
LYMPHOCYTES RELATIVE PERCENT: 24.4 % (ref 24–44)
MAGNESIUM: 2.2 MG/DL (ref 1.8–2.4)
MCH RBC QN AUTO: 31.1 PG (ref 27–31)
MCHC RBC AUTO-ENTMCNC: 33.6 % (ref 32–36)
MCV RBC AUTO: 92.7 FL (ref 78–100)
MONOCYTES ABSOLUTE: 0.9 K/CU MM
MONOCYTES RELATIVE PERCENT: 8.9 % (ref 0–4)
NEUTROPHILS ABSOLUTE: 6.2 K/CU MM
NEUTROPHILS RELATIVE PERCENT: 63.7 % (ref 36–66)
NUCLEATED RBC %: 0 %
PDW BLD-RTO: 12.7 % (ref 11.7–14.9)
PHOSPHORUS: 3.7 MG/DL (ref 2.5–4.9)
PLATELET # BLD: 198 K/CU MM (ref 140–440)
PMV BLD AUTO: 9.7 FL (ref 7.5–11.1)
POTASSIUM SERPL-SCNC: 4.3 MMOL/L (ref 3.5–5.1)
RBC # BLD: 4.95 M/CU MM (ref 4.6–6.2)
SODIUM BLD-SCNC: 139 MMOL/L (ref 135–145)
TOTAL IMMATURE NEUTOROPHIL: 0.07 K/CU MM
TOTAL NUCLEATED RBC: 0 K/CU MM
WBC # BLD: 9.7 K/CU MM (ref 4–10.5)

## 2024-07-29 PROCEDURE — 6370000000 HC RX 637 (ALT 250 FOR IP): Performed by: STUDENT IN AN ORGANIZED HEALTH CARE EDUCATION/TRAINING PROGRAM

## 2024-07-29 PROCEDURE — 92610 EVALUATE SWALLOWING FUNCTION: CPT

## 2024-07-29 PROCEDURE — 6370000000 HC RX 637 (ALT 250 FOR IP): Performed by: NURSE PRACTITIONER

## 2024-07-29 PROCEDURE — 84100 ASSAY OF PHOSPHORUS: CPT

## 2024-07-29 PROCEDURE — 2580000003 HC RX 258: Performed by: STUDENT IN AN ORGANIZED HEALTH CARE EDUCATION/TRAINING PROGRAM

## 2024-07-29 PROCEDURE — 82330 ASSAY OF CALCIUM: CPT

## 2024-07-29 PROCEDURE — 6370000000 HC RX 637 (ALT 250 FOR IP): Performed by: PHYSICIAN ASSISTANT

## 2024-07-29 PROCEDURE — 80048 BASIC METABOLIC PNL TOTAL CA: CPT

## 2024-07-29 PROCEDURE — 85025 COMPLETE CBC W/AUTO DIFF WBC: CPT

## 2024-07-29 PROCEDURE — 83735 ASSAY OF MAGNESIUM: CPT

## 2024-07-29 PROCEDURE — 97530 THERAPEUTIC ACTIVITIES: CPT

## 2024-07-29 PROCEDURE — 1200000000 HC SEMI PRIVATE

## 2024-07-29 PROCEDURE — 94761 N-INVAS EAR/PLS OXIMETRY MLT: CPT

## 2024-07-29 PROCEDURE — 99233 SBSQ HOSP IP/OBS HIGH 50: CPT | Performed by: NURSE PRACTITIONER

## 2024-07-29 PROCEDURE — 6360000002 HC RX W HCPCS: Performed by: STUDENT IN AN ORGANIZED HEALTH CARE EDUCATION/TRAINING PROGRAM

## 2024-07-29 PROCEDURE — 97112 NEUROMUSCULAR REEDUCATION: CPT

## 2024-07-29 PROCEDURE — 82962 GLUCOSE BLOOD TEST: CPT

## 2024-07-29 RX ORDER — INSULIN LISPRO 100 [IU]/ML
0-4 INJECTION, SOLUTION INTRAVENOUS; SUBCUTANEOUS NIGHTLY
Status: DISCONTINUED | OUTPATIENT
Start: 2024-07-29 | End: 2024-07-30 | Stop reason: HOSPADM

## 2024-07-29 RX ORDER — INSULIN LISPRO 100 [IU]/ML
0-16 INJECTION, SOLUTION INTRAVENOUS; SUBCUTANEOUS
Status: DISCONTINUED | OUTPATIENT
Start: 2024-07-29 | End: 2024-07-30 | Stop reason: HOSPADM

## 2024-07-29 RX ADMIN — Medication 1 TABLET: at 08:59

## 2024-07-29 RX ADMIN — INSULIN LISPRO 8 UNITS: 100 INJECTION, SOLUTION INTRAVENOUS; SUBCUTANEOUS at 17:10

## 2024-07-29 RX ADMIN — Medication 100 MG: at 09:01

## 2024-07-29 RX ADMIN — ROSUVASTATIN CALCIUM 40 MG: 20 TABLET, FILM COATED ORAL at 21:38

## 2024-07-29 RX ADMIN — ENOXAPARIN SODIUM 40 MG: 100 INJECTION SUBCUTANEOUS at 21:38

## 2024-07-29 RX ADMIN — CARVEDILOL 6.25 MG: 6.25 TABLET, FILM COATED ORAL at 17:10

## 2024-07-29 RX ADMIN — SODIUM CHLORIDE, PRESERVATIVE FREE 10 ML: 5 INJECTION INTRAVENOUS at 08:59

## 2024-07-29 RX ADMIN — RANOLAZINE 500 MG: 500 TABLET, EXTENDED RELEASE ORAL at 21:39

## 2024-07-29 RX ADMIN — SODIUM CHLORIDE, PRESERVATIVE FREE 10 ML: 5 INJECTION INTRAVENOUS at 21:38

## 2024-07-29 RX ADMIN — LOSARTAN POTASSIUM 25 MG: 25 TABLET, FILM COATED ORAL at 09:00

## 2024-07-29 RX ADMIN — CARVEDILOL 6.25 MG: 6.25 TABLET, FILM COATED ORAL at 09:00

## 2024-07-29 RX ADMIN — ZINC SULFATE 220 MG (50 MG) CAPSULE 50 MG: CAPSULE at 09:01

## 2024-07-29 RX ADMIN — RANOLAZINE 500 MG: 500 TABLET, EXTENDED RELEASE ORAL at 08:59

## 2024-07-29 RX ADMIN — CLOPIDOGREL BISULFATE 75 MG: 75 TABLET ORAL at 09:00

## 2024-07-29 RX ADMIN — ASPIRIN 81 MG 81 MG: 81 TABLET ORAL at 08:59

## 2024-07-29 RX ADMIN — FOLIC ACID 1 MG: 1 TABLET ORAL at 09:01

## 2024-07-29 ASSESSMENT — PAIN SCALES - GENERAL
PAINLEVEL_OUTOF10: 0
PAINLEVEL_OUTOF10: 0
PAINLEVEL_OUTOF10: 2

## 2024-07-29 NOTE — PROGRESS NOTES
accurately identified 5/5 common objects around his room. Speech intelligibility is WFL. Pt's son reports pt's speech was slurred upon admission, but this improved yesterday evening and has continued to improve today. Pragmatic skills are WFL in informal conversation. No services for speech-language-cognition warranted at this time as pt's skills appear functional for navigation of the acute care environment.     Recommendations: Recommend Franko Odom consume a regular diet and thin liquids with medications PO provided use of aspiration precautions. SLP to sign-off as pt's swallow appears WFL at this time.    Date of Eval: 7/29/2024  Evaluating Therapist: RONNY Hinson    Current Diet level:  Current Diet : Regular  Current Liquid Diet : Thin    Primary Complaint  Pt does not complain of difficulty swallow or hx of dysphagia    Pain:  Pain Assessment  Pain Assessment: None - Denies Pain  Pain Level: 0    Reason for Referral  Franko Odom was referred for a bedside swallow evaluation to assess the efficiency of his swallow function, identify signs and symptoms of aspiration and make recommendations regarding safe dietary consistencies, effective compensatory strategies, and safe eating environment.    Impression  Dysphagia Diagnosis: Swallow function appears WFL  Dysphagia Outcome Severity Scale: Level 7: Normal in all situations     Treatment Plan  Requires SLP Intervention: No  Duration of Treatment: N/A  D/C Recommendations: No follow up therapy recommended post discharge       Recommended Diet and Intervention  Diet Solids Recommendation: Regular  Liquid Consistency Recommendation: Thin  Recommended Form of Meds: PO  Recommendations: Self feed       Treatment/Goals  Short-term Goals  Timeframe for Short-term Goals: N/A    General  Chart Reviewed: Yes  Behavior/Cognition: Alert;Cooperative;Pleasant mood  Respiratory Status: Room air  O2 Device: None (Room air)  Communication Observation:

## 2024-07-29 NOTE — PROGRESS NOTES
I have personally seen and examined the patient independently. I have reviewed the patient's available data including pertinent medical history and recent test results. Reviewed and discussed note as documented by MINE.  I agree with the physical exam findings, assessment and plan. My documented MDM is a substantive portion of the supervisory note.     A posterior stroke, status post TNK 7/27/2024, resolution of symptoms  Coronary artery disease prior CABG  Diabetes mellitus type 2  Hypertension  Hyperlipidemia  GERD      Given the patient's overall clinical improvement and resolution of strokelike symptoms (ataxia, vomiting, altered vision), the patient may transfer out of critical care unit setting  Continuation of aspirin statin  Continuation of other medical measures as provided for comorbid medical conditions.    Complex decisions required for evaluation management, reviewed during critical care rounds.    E Cordasco  [phone]

## 2024-07-29 NOTE — PROGRESS NOTES
Neurology Service Progress Note  [facility]   Patient Name: Franko Odom  : 1945        Subjective:   Reason for consult: Stroke like symptoms s/p TNK  Chart was reviewed in detail, patient was seen and assessed. He is up in chair today, son is at bedside. Patient continue to have mild right facial weakness, speech is slurred but patient and his son feel it is improving. He is no longer experiencing double vision but does report continued \"hazy\" vision. He does feel off balance with ambulation. Reviewed MRI imaging with the patient and his son and they understand that there is evidence of a small acute stroke in the right middle cerebellar peduncle. Verify now aspirin was completed and patient is not a responder. Plavix has been initiated. He is awaiting PT/OT recommendations for therapy at discharge.     Past Medical History:   Diagnosis Date    Acid reflux     Anemia     Arthritis     \"Wrists, Knees, Ankles And Spine\"    Belching     \"All The Time\"    CAD (coronary artery disease)     Sees Dr. Tara Cotter    Chronic back pain     Diabetes mellitus (HCC) Dx 's    \"I Go To [facility] For Diabetic Care, I See Georgia Toscano CNP\"    Difficult intubation     Diverticulitis Dx Early 's    Echocardiogram 2019    EF 55-60%, Grade 1 diastolic dysfunction, No signifiant valvular disease note. No pericardial effusion.    H/O echocardiogram 2016    The left ventricular size is normal. There is mild concentric LVH. Trace to mild MR     H/O exercise stress test 2016    Patient tolerated well without focal complaints. Heart rate response Appropriate. Normal findings.     H/O percutaneous left heart catheterization 2016    LAD 80%, Cx 80%, RCA 50% prox, PDA 90% mid    Hyperlipidemia     Hypertension     Lexiscan stress test 2019    EF 71%, Moderate size medium inferior ischemia. Abn stress    Mononucleosis Dx     Pleurisy Dx     Ringing in the ears      rashes  Neuro: Denies new numbness or tingling or weakness  Endocrine: Denies unexpected weight loss  Heme: Denies bleeding disorders    Physical Exam:      Vitals:    07/29/24 1111   BP:    Pulse: 79   Resp:    Temp:    SpO2:        Vitals:    07/29/24 1111   BP:    Pulse: 79   Resp:    Temp:    SpO2:        Wt Readings from Last 3 Encounters:   07/27/24 97 kg (213 lb 13.5 oz)   07/27/24 97 kg (213 lb 13.5 oz)   07/27/24 96.6 kg (213 lb)     Temp Readings from Last 3 Encounters:   07/29/24 98 °F (36.7 °C) (Oral)   07/27/24 97.6 °F (36.4 °C) (Oral)   02/28/22 97.9 °F (36.6 °C) (Oral)     BP Readings from Last 3 Encounters:   07/29/24 119/80   07/27/24 (!) 146/86   07/12/24 136/88     Pulse Readings from Last 3 Encounters:   07/29/24 79   07/27/24 75   07/12/24 87        Gen: A&O x 4, NAD, cooperative  HEENT: NC/AT, no nystagmus,  PERRL, mmm, neck supple, no meningeal signs;    Heart: NSR per monitor   Lungs: Respirations  even and unlabored  Ext: no edema, no calf tenderness b/l  Psych: normal mood and affect  Skin: no rashes or lesions    NEUROLOGIC EXAM:    Mental Status: A&O to self, location, month and year, NAD, speech slurred/bulbar, language fluent, repetition and naming intact, follows commands appropriately    Cranial Nerve Exam:   CN II-XII:    PERRL, VFF, no nystagmus, no gaze paresis, sensation V1-V3 intact b/l, muscles of facial expression asymmetric on right; hearing intact to conversational tone, palate elevates symmetrically, shoulder elevation symmetric and tongue protrudes midline with movement side to side.    Motor Exam:       Strength 5/5 UE's/LE's b/l  Tone and bulk normal   No pronator drift    Deep Tendon Reflexes: 2/4 biceps, triceps, brachioradialis, patellar, and achilles b/l; flexor plantar responses b/l    Sensation: Intact light touch/pinprick/vibration UE's/LE's b/l    Coordination/Cerebellum:       Tremors--none      Rapidly alternating movements: no dysdiadochokinesia b/l

## 2024-07-29 NOTE — CARE COORDINATION
Met with patient and wife  and discussed ARU.  Explained to patient the required 3 hours of therapy a day.  Also explained the average length of stay is 11 days, could be longer or shorter depending on recommendations of therapy and Dr. Colmenares.  Patient expresses his understanding and states he's agreeable to admit to ARU.    Per patient and spouse goal is for patient to return home at discharge from ARU.    Discussed patients discharge plan from ARU.  Per patient he wants to discharge home from ARU. Discussed the difference between ARU vs. SNF.  Patient expresses no interest in discharging to SNF.    Patients primary insurance is [employer] medicare.  Received approval for admission to ARU.   Auth# [id number].      Patient meets criteria and is approved to come to ARU.   Patient able to admit once medically stable and after ARU Medical Director and  sign the pre-admission screen (PAS), pending bed availability.         Notified MD of approval. Per MD patient not medically ready for discharge to ARU today.  Will follow up tomorrow for medical stability.

## 2024-07-29 NOTE — PROGRESS NOTES
V2.0    [facility] Progress Note      Name:  Franko Odom /Age/Sex: 1945  (78 y.o. male)   MRN & CSN:  [id number] & [id number] Encounter Date/Time: 2024 12:38 PM EDT   Location:  -A PCP: Ariel Meyer MD     Attending:Ozzy Azevedo Jr., *       Hospital Day: 3    Assessment and Recommendations   Franko Odom is a 78 y.o. male with pmh of hypertension, hyper lipidemia, diabetes, CAD status post CABG  who presents with Acute ischemic stroke (HCC)      # Acute ischemic stroke status post TNK: Presented with  dizziness, blurred/double vision, and balance issues associated with 1 episode of nonbilious none bloody emesis while in the waiting room.  Last known well less than 4 hours prior to arrival.  NIH stroke scale on admission 1,   CT head, CTA head and neck negative for any acute findings, status post TNK, admitted in ICU for every hour neurochecks.  MRI showed acute infarct in the right middle cerebellar peduncle neurology consulted, evaluated and started on aspirin, Plavix, continue rosuvastatin, PT/OT evaluated and recommended ARU, obtain echocardiogram, continue management per CCM.    # CAD s/p CABG: Hemodynamically stable,  # Diabetes: A1c 7.5% on 2024, continue sliding scale insulin, hypoglycemic protocol and diabetic diet  # Hypertension: On losartan and Coreg continue  # Hyperlipidemia: On rosuvastatin continue  # GERD: On PPI continue     Comment: Please note this report has been produced using speech recognition software and may contain errors related to that system including errors in grammar, punctuation, and spelling, as well as words and phrases that may be inappropriate. If there are any questions or concerns please feel free to contact the dictating provider for clarification.       Diet ADULT DIET; Regular; 4 carb choices (60 gm/meal)   DVT Prophylaxis [x] Lovenox, []  Heparin, [] SCDs, [] Ambulation,  [] Eliquis, [] Xarelto  [] Coumadin   Code Status Full  unremarkable. Minimal atherosclerotic changes of the proximal left ICA. Soft tissues are unremarkable. There is a large probable calcified granuloma in the left lung apex. Sternotomy changes are noted in the visualized sternum. There are no sclerotic or lytic lesions identified.     No critical stenosis, aneurysm or occlusion identified. Electronically signed by Calendargod    CT HEAD WO CONTRAST    Result Date: 7/27/2024  EXAMINATION: CT CODE NEURO HEAD WO CONTRAST, 7/27/2024 5:36 PM HISTORY: Stroke Symptoms COMPARISON:  No comparisons available.  Technique: Axial images obtained without contrast. One or more of the following dose reduction techniques were used: automated exposure control, adjustment of the mA and/or kV according to patient size, use of iterative reconstruction technique. Findings: No acute infarct or parenchymal hemorrhage. No abnormal mass or mass effect. No midline shift. No extra-axial fluid collections. No hydrocephalus. Mastoid air cells unremarkable. Sinuses and orbits unremarkable. No acute fracture. No significant facial or scalp soft tissue swelling evident. No radiopaque foreign body is seen.     1.No acute intracranial abnormality. Electronically signed by Calendargod    XR CHEST PORTABLE    Result Date: 7/27/2024  EXAMINATION: XR CHEST PORTABLE, 7/27/2024 5:46 PM HISTORY: stroke symptoms COMPARISON:  No comparisons available.  Technique: Single view. Findings: The lungs are clear, no effusion. No pneumothorax. Heart is normal size. Mediastinal and hilar contours are within normal limits. Poststernotomy     No acute cardiopulmonary abnormality. Electronically signed by Calendargod      CBC:   Recent Labs     07/27/24  1723 07/29/24  0445   WBC 8.1 9.7   HGB 16.8 15.4    198     BMP:    Recent Labs     07/27/24  1723 07/29/24  0445    139   K 4.0 4.3   CL 98* 102   CO2 22 21   BUN 19 24*   CREATININE 1.3 0.9   GLUCOSE 253* 169*     Hepatic:   Recent Labs     07/27/24 1723

## 2024-07-29 NOTE — TELEPHONE ENCOUNTER
Patient will need a follow up in the stroke clinic once discharged from hospital with either Yessica or Nyasia.

## 2024-07-29 NOTE — CARE COORDINATION
Cm reviewed chart and met with pt and family at bedside. Cm introduced self and role of case management. Pt lives in a one story house, but with 13 steps to enter house from basement. Pt's home does have a front door with three steps, and a rail on the right hand side. Pt still drives. Pt's wife has a rolling walker and cane at home that she does not use. Pt's older son, Musa, lives at home with pt and is wife and is able to assist with home needs. Cm spoke with family about therapy's recommendation for intensive therapy. Pt and family is agreeable to ARU. No other questions or needs stated at this time. Cm to follow.    8142 cm made referral to ARU.     07/29/24 1227   Service Assessment   Patient Orientation Alert and Oriented   Cognition Alert   History Provided By Patient;Medical Record;Child/Family;Spouse   Primary Caregiver Self   Accompanied By/Relationship Wife Georgia and son Matteo   Support Systems Spouse/Significant Other;Children   Patient's Healthcare Decision Maker is: Legal Next of Kin  (Wife, Georgia)   PCP Verified by CM No   Prior Functional Level Independent in ADLs/IADLs   Current Functional Level Assistance with the following:;Toileting;Mobility  (per hospital policy)   Can patient return to prior living arrangement Yes   Ability to make needs known: Good   Family able to assist with home care needs: Yes   Would you like for me to discuss the discharge plan with any other family members/significant others, and if so, who? Yes  (Legal next of kin and family as needed)   Financial Resources Medicare   Community Resources None   CM/SW Referral Other (see comment)  (Discharge planning assessment)

## 2024-07-29 NOTE — PROGRESS NOTES
V2.0  [facility] [place]      Name:  Franko Odom /Age/Sex: 1945  (78 y.o. male)   MRN & CSN:  [id number] & [id number] Encounter Date/Time: 2024 11:02 AM EDT    Location:  -A PCP: Ariel Meyer MD       Hospital Day: 3    Assessment and Plan:   Franko Odom is a 78 y.o. male with pmh of   hypertension, hyperlipidemia, diabetes, CAD, CABG who presented to [facility] 2024 with strokelike symptoms, status post TNK.  Admitted to the ICU post management.     Plan:  Strokelike symptoms s/p TNK ~1807 hrs   Dizziness, ataxia, vomiting, blurry vision  CAD s/p CABG  Diabetes-2  Hypertension  Hyperlipidemia  GERD    Neuro: Presented with new onset dizziness, blurred/diplopia, and balance issues associated with 1 episode of nonbilious non bloody emesis while in the waiting room.  Last known well less than 4 hours prior to arrival.  NIH stroke scale on admission 1, status post TNK at 1807 hrs on ,  CT head, CTA head and neck negative for any acute findings.  Every hour neurochecks per protocol for the first 24 hrs, relaxed to q 4 hrs this am, MRI brain e/o acute right middle cerebellar  ischemic stroke in the a.m. Neurology consulted, recs appreciated.  Stroke secondary prevention, lipid control, on statin, mainatin euglycemia, aspirin+plavix x 21 days  Cardio: blood pressure goal < 180/105 mm of hg per post TNK protocol x 24 hrs, now goal /90 mmHg, known hypertensive, CAD, hemodynamically stable.  Monitor vitals adjust as needed.  Started on DAPT, resumed home Cozaar, Ranexa, Coreg   Resp: On room air.  Continue inhalers and nebs as needed.  Continue pulmonary toileting, maintain O2 sats > 94%  GI: Advance diet as tolerated, GI prophylaxis: diet  : Creatinine of 0.9, unknown baseline.  Monitor urine output, monitor electrolytes and replenish as needed.  Heme: Hemoglobin of 15.4, platelets 198k.  DVT prophylaxis SCDs for the first 24 hours, transition to

## 2024-07-29 NOTE — PROGRESS NOTES
Physical Therapy    Physical Therapy Treatment Note  Name: Franko Odom MRN: [id number] :   1945   Date:  2024   Admission Date: 2024 Room:  [address] [place] [address]   Restrictions/Precautions:  Restrictions/Precautions  Restrictions/Precautions: General Precautions, Fall Risk         Communication with other providers:  nurse oks tx  Subjective:  Patient states:  agreeeable  Pain:   Location, Type, Intensity (0/10 to 10/10):  denies    Objective:    Observation:  in chair  Objective Measures:  HR 86, SpO2 100%  Treatment, including education/measures:  Seated LAQ, marches x20  Standing marches for R SL wb facilitation, DL STS squats /c no UE use s90yise.  Vc for proper form. modA for hip abd d/t excess R lean/sway and instability. Vc for proper form.  STS and SPT /c Diallo using FWW  Gait training using FWW x~20, 20' /c modA throughout for R SL instability.   Neuro re-ed training /c FWW, amb on line for proper LE placement and stability fwd and laterally to L/R. modA throughout.  Safety  Patient left safely in the chair, with call light/phone in reach with alarm applied. Gait belt was used for transfers and gait.    Assessment / Impression:      Patient's tolerance of treatment:  good   Adverse Reaction: na  Significant change in status and impact:  na  Barriers to improvement:  weakness and endurance  Plan for Next Session:    Cont neuro re-ed, gait training                 Time in:  1023  Time out:  1056  Timed treatment minutes:  33  Total treatment time:  33    Previously filed items:  Social/Functional History  Lives With: Spouse  Type of Home: House  Home Layout: One level  Home Access: Stairs to enter with rails  Entrance Stairs - Number of Steps: 13  Bathroom Shower/Tub: Walk-in shower  Bathroom Toilet: Standard  Bathroom Equipment: Grab bars in shower  Home Equipment: Cane, Walker - Rolling  ADL Assistance: Independent  Ambulation Assistance: Independent  Transfer Assistance: Independent  Active  : Yes     Long Term Goals  Time Frame for Long Term Goals : In one week:  Long Term Goal 1: Pt will complete all bed mobility with SBAx1  Long Term Goal 2: Pt will complete sit <> stand transfers with CGAx1  Long Term Goal 3: Pt will ambulate 100 feet with minAx1 with LRAD (chair follow for safety at this time)  Long Term Goal 4: Pt will ascend/descend 6 steps with a handrail with modAx2  Long Term Goal 5: Pt will independently complete 3 sets of 10 reps of BLE AROM exercises       Electronically signed by:    Fran Mercer, PTA  7/29/2024, 10:36 AM

## 2024-07-30 ENCOUNTER — HOSPITAL ENCOUNTER (INPATIENT)
Age: 79
DRG: 057 | End: 2024-07-30
Attending: PHYSICAL MEDICINE & REHABILITATION | Admitting: PHYSICAL MEDICINE & REHABILITATION
Payer: MEDICARE

## 2024-07-30 ENCOUNTER — APPOINTMENT (OUTPATIENT)
Dept: NON INVASIVE DIAGNOSTICS | Age: 79
End: 2024-07-30
Attending: STUDENT IN AN ORGANIZED HEALTH CARE EDUCATION/TRAINING PROGRAM
Payer: MEDICARE

## 2024-07-30 VITALS
WEIGHT: 209.6 LBS | RESPIRATION RATE: 18 BRPM | SYSTOLIC BLOOD PRESSURE: 140 MMHG | OXYGEN SATURATION: 99 % | DIASTOLIC BLOOD PRESSURE: 86 MMHG | HEART RATE: 84 BPM | HEIGHT: 71 IN | TEMPERATURE: 97.8 F | BODY MASS INDEX: 29.34 KG/M2

## 2024-07-30 PROBLEM — I63.9 ACUTE CVA (CEREBROVASCULAR ACCIDENT) (HCC): Status: ACTIVE | Noted: 2024-07-30

## 2024-07-30 LAB
ECHO AO ROOT DIAM: 3.1 CM
ECHO AO ROOT INDEX: 1.44 CM/M2
ECHO AV AREA PEAK VELOCITY: 2.3 CM2
ECHO AV AREA VTI: 2.5 CM2
ECHO AV AREA/BSA PEAK VELOCITY: 1.1 CM2/M2
ECHO AV AREA/BSA VTI: 1.2 CM2/M2
ECHO AV MEAN GRADIENT: 2 MMHG
ECHO AV MEAN VELOCITY: 0.7 M/S
ECHO AV PEAK GRADIENT: 4 MMHG
ECHO AV PEAK VELOCITY: 1.1 M/S
ECHO AV VELOCITY RATIO: 0.73
ECHO AV VTI: 19.6 CM
ECHO BSA: 2.2 M2
ECHO EST RA PRESSURE: 3 MMHG
ECHO IVC PROX: 1.2 CM
ECHO LA AREA 4C: 11.3 CM2
ECHO LA DIAMETER INDEX: 1.4 CM/M2
ECHO LA DIAMETER: 3 CM
ECHO LA MAJOR AXIS: 4.6 CM
ECHO LA TO AORTIC ROOT RATIO: 0.97
ECHO LA VOL MOD A4C: 20 ML (ref 18–58)
ECHO LA VOLUME INDEX MOD A4C: 9 ML/M2 (ref 16–34)
ECHO LV E' LATERAL VELOCITY: 8 CM/S
ECHO LV E' SEPTAL VELOCITY: 6 CM/S
ECHO LV EDV A4C: 66 ML
ECHO LV EDV INDEX A4C: 31 ML/M2
ECHO LV EJECTION FRACTION A4C: 51 %
ECHO LV ESV A4C: 32 ML
ECHO LV ESV INDEX A4C: 15 ML/M2
ECHO LV FRACTIONAL SHORTENING: 30 % (ref 28–44)
ECHO LV INTERNAL DIMENSION DIASTOLE INDEX: 2.05 CM/M2
ECHO LV INTERNAL DIMENSION DIASTOLIC: 4.4 CM (ref 4.2–5.9)
ECHO LV INTERNAL DIMENSION SYSTOLIC INDEX: 1.44 CM/M2
ECHO LV INTERNAL DIMENSION SYSTOLIC: 3.1 CM
ECHO LV IVSD: 1 CM (ref 0.6–1)
ECHO LV MASS 2D: 137.8 G (ref 88–224)
ECHO LV MASS INDEX 2D: 64.1 G/M2 (ref 49–115)
ECHO LV POSTERIOR WALL DIASTOLIC: 0.9 CM (ref 0.6–1)
ECHO LV RELATIVE WALL THICKNESS RATIO: 0.41
ECHO LVOT AREA: 3.1 CM2
ECHO LVOT AV VTI INDEX: 0.82
ECHO LVOT DIAM: 2 CM
ECHO LVOT MEAN GRADIENT: 2 MMHG
ECHO LVOT PEAK GRADIENT: 3 MMHG
ECHO LVOT PEAK VELOCITY: 0.8 M/S
ECHO LVOT STROKE VOLUME INDEX: 23.4 ML/M2
ECHO LVOT SV: 50.2 ML
ECHO LVOT VTI: 16 CM
ECHO MV A VELOCITY: 0.64 M/S
ECHO MV E VELOCITY: 0.42 M/S
ECHO MV E/A RATIO: 0.66
ECHO MV E/E' LATERAL: 5.25
ECHO MV E/E' RATIO (AVERAGED): 6.13
ECHO MV E/E' SEPTAL: 7
ECHO RIGHT VENTRICULAR SYSTOLIC PRESSURE (RVSP): 22 MMHG
ECHO RV MID DIMENSION: 2.9 CM
ECHO TV REGURGITANT MAX VELOCITY: 2.19 M/S
ECHO TV REGURGITANT PEAK GRADIENT: 19 MMHG
GLUCOSE BLD-MCNC: 209 MG/DL (ref 70–99)
GLUCOSE BLD-MCNC: 245 MG/DL (ref 70–99)
GLUCOSE BLD-MCNC: 264 MG/DL (ref 70–99)
GLUCOSE BLD-MCNC: 297 MG/DL (ref 70–99)

## 2024-07-30 PROCEDURE — 6370000000 HC RX 637 (ALT 250 FOR IP): Performed by: STUDENT IN AN ORGANIZED HEALTH CARE EDUCATION/TRAINING PROGRAM

## 2024-07-30 PROCEDURE — 94150 VITAL CAPACITY TEST: CPT

## 2024-07-30 PROCEDURE — 99222 1ST HOSP IP/OBS MODERATE 55: CPT | Performed by: PHYSICAL MEDICINE & REHABILITATION

## 2024-07-30 PROCEDURE — 6360000002 HC RX W HCPCS: Performed by: PHYSICAL MEDICINE & REHABILITATION

## 2024-07-30 PROCEDURE — 94761 N-INVAS EAR/PLS OXIMETRY MLT: CPT

## 2024-07-30 PROCEDURE — 1280000000 HC REHAB R&B

## 2024-07-30 PROCEDURE — 6370000000 HC RX 637 (ALT 250 FOR IP): Performed by: PHYSICIAN ASSISTANT

## 2024-07-30 PROCEDURE — 93306 TTE W/DOPPLER COMPLETE: CPT | Performed by: INTERNAL MEDICINE

## 2024-07-30 PROCEDURE — 97530 THERAPEUTIC ACTIVITIES: CPT

## 2024-07-30 PROCEDURE — 2580000003 HC RX 258: Performed by: STUDENT IN AN ORGANIZED HEALTH CARE EDUCATION/TRAINING PROGRAM

## 2024-07-30 PROCEDURE — 99232 SBSQ HOSP IP/OBS MODERATE 35: CPT | Performed by: NURSE PRACTITIONER

## 2024-07-30 PROCEDURE — 82962 GLUCOSE BLOOD TEST: CPT

## 2024-07-30 PROCEDURE — 6370000000 HC RX 637 (ALT 250 FOR IP)

## 2024-07-30 PROCEDURE — 93306 TTE W/DOPPLER COMPLETE: CPT

## 2024-07-30 PROCEDURE — 6370000000 HC RX 637 (ALT 250 FOR IP): Performed by: PHYSICAL MEDICINE & REHABILITATION

## 2024-07-30 RX ORDER — INSULIN LISPRO 100 [IU]/ML
0-4 INJECTION, SOLUTION INTRAVENOUS; SUBCUTANEOUS
Status: DISCONTINUED | OUTPATIENT
Start: 2024-07-30 | End: 2024-07-30 | Stop reason: SDUPTHER

## 2024-07-30 RX ORDER — ONDANSETRON 2 MG/ML
4 INJECTION INTRAMUSCULAR; INTRAVENOUS EVERY 6 HOURS PRN
Status: CANCELLED | OUTPATIENT
Start: 2024-07-30

## 2024-07-30 RX ORDER — INSULIN LISPRO 100 [IU]/ML
0-4 INJECTION, SOLUTION INTRAVENOUS; SUBCUTANEOUS NIGHTLY
Status: DISCONTINUED | OUTPATIENT
Start: 2024-07-30 | End: 2024-07-30 | Stop reason: SDUPTHER

## 2024-07-30 RX ORDER — ONDANSETRON 4 MG/1
4 TABLET, ORALLY DISINTEGRATING ORAL EVERY 8 HOURS PRN
Status: CANCELLED | OUTPATIENT
Start: 2024-07-30

## 2024-07-30 RX ORDER — INSULIN LISPRO 100 [IU]/ML
0-16 INJECTION, SOLUTION INTRAVENOUS; SUBCUTANEOUS
Status: DISPENSED | OUTPATIENT
Start: 2024-07-30

## 2024-07-30 RX ORDER — ASPIRIN 81 MG/1
81 TABLET, CHEWABLE ORAL DAILY
Status: DISPENSED | OUTPATIENT
Start: 2024-07-31 | End: 2024-08-21

## 2024-07-30 RX ORDER — POLYETHYLENE GLYCOL 3350 17 G/17G
17 POWDER, FOR SOLUTION ORAL DAILY PRN
Status: ACTIVE | OUTPATIENT
Start: 2024-07-30

## 2024-07-30 RX ORDER — ALOGLIPTIN 12.5 MG/1
25 TABLET, FILM COATED ORAL DAILY
Status: DISPENSED | OUTPATIENT
Start: 2024-07-30

## 2024-07-30 RX ORDER — CARVEDILOL 6.25 MG/1
6.25 TABLET ORAL 2 TIMES DAILY WITH MEALS
Status: CANCELLED | OUTPATIENT
Start: 2024-07-30

## 2024-07-30 RX ORDER — ALOGLIPTIN 12.5 MG/1
25 TABLET, FILM COATED ORAL DAILY
Status: CANCELLED | OUTPATIENT
Start: 2024-07-30

## 2024-07-30 RX ORDER — ROSUVASTATIN CALCIUM 20 MG/1
40 TABLET, COATED ORAL NIGHTLY
Status: DISCONTINUED | OUTPATIENT
Start: 2024-07-30 | End: 2024-07-30

## 2024-07-30 RX ORDER — ACETAMINOPHEN 325 MG/1
650 TABLET ORAL EVERY 4 HOURS PRN
Status: DISPENSED | OUTPATIENT
Start: 2024-07-30

## 2024-07-30 RX ORDER — GLUCAGON 1 MG/ML
1 KIT INJECTION PRN
Status: ACTIVE | OUTPATIENT
Start: 2024-07-30

## 2024-07-30 RX ORDER — SODIUM CHLORIDE 9 MG/ML
INJECTION, SOLUTION INTRAVENOUS PRN
Status: ACTIVE | OUTPATIENT
Start: 2024-07-30

## 2024-07-30 RX ORDER — POLYETHYLENE GLYCOL 3350 17 G/17G
17 POWDER, FOR SOLUTION ORAL DAILY PRN
Status: CANCELLED | OUTPATIENT
Start: 2024-07-30

## 2024-07-30 RX ORDER — DEXTROSE MONOHYDRATE 100 MG/ML
INJECTION, SOLUTION INTRAVENOUS CONTINUOUS PRN
Status: ACTIVE | OUTPATIENT
Start: 2024-07-30

## 2024-07-30 RX ORDER — ROSUVASTATIN CALCIUM 20 MG/1
40 TABLET, COATED ORAL NIGHTLY
Status: CANCELLED | OUTPATIENT
Start: 2024-07-30

## 2024-07-30 RX ORDER — ZINC SULFATE 50(220)MG
50 CAPSULE ORAL DAILY
Status: DISPENSED | OUTPATIENT
Start: 2024-07-31

## 2024-07-30 RX ORDER — ONDANSETRON 4 MG/1
4 TABLET, ORALLY DISINTEGRATING ORAL EVERY 8 HOURS PRN
Status: ACTIVE | OUTPATIENT
Start: 2024-07-30

## 2024-07-30 RX ORDER — M-VIT,TX,IRON,MINS/CALC/FOLIC 27MG-0.4MG
1 TABLET ORAL DAILY
Status: DISPENSED | OUTPATIENT
Start: 2024-07-31

## 2024-07-30 RX ORDER — ONDANSETRON 2 MG/ML
4 INJECTION INTRAMUSCULAR; INTRAVENOUS EVERY 6 HOURS PRN
Status: ACTIVE | OUTPATIENT
Start: 2024-07-30

## 2024-07-30 RX ORDER — LOSARTAN POTASSIUM 25 MG/1
25 TABLET ORAL DAILY
Status: DISPENSED | OUTPATIENT
Start: 2024-07-31

## 2024-07-30 RX ORDER — GLUCAGON 1 MG/ML
1 KIT INJECTION PRN
Status: CANCELLED | OUTPATIENT
Start: 2024-07-30

## 2024-07-30 RX ORDER — M-VIT,TX,IRON,MINS/CALC/FOLIC 27MG-0.4MG
1 TABLET ORAL DAILY
Status: CANCELLED | OUTPATIENT
Start: 2024-07-31

## 2024-07-30 RX ORDER — SODIUM CHLORIDE 0.9 % (FLUSH) 0.9 %
5-40 SYRINGE (ML) INJECTION PRN
Status: CANCELLED | OUTPATIENT
Start: 2024-07-30

## 2024-07-30 RX ORDER — ASPIRIN 300 MG/1
300 SUPPOSITORY RECTAL DAILY
Status: CANCELLED | OUTPATIENT
Start: 2024-07-31 | End: 2024-08-21

## 2024-07-30 RX ORDER — RANOLAZINE 500 MG/1
500 TABLET, EXTENDED RELEASE ORAL 2 TIMES DAILY
Status: CANCELLED | OUTPATIENT
Start: 2024-07-30

## 2024-07-30 RX ORDER — CLOPIDOGREL BISULFATE 75 MG/1
75 TABLET ORAL DAILY
Status: DISPENSED | OUTPATIENT
Start: 2024-07-31

## 2024-07-30 RX ORDER — LANOLIN ALCOHOL/MO/W.PET/CERES
100 CREAM (GRAM) TOPICAL DAILY
Status: DISPENSED | OUTPATIENT
Start: 2024-07-31

## 2024-07-30 RX ORDER — SODIUM CHLORIDE 0.9 % (FLUSH) 0.9 %
5-40 SYRINGE (ML) INJECTION PRN
Status: ACTIVE | OUTPATIENT
Start: 2024-07-30

## 2024-07-30 RX ORDER — CLOPIDOGREL BISULFATE 75 MG/1
75 TABLET ORAL DAILY
Status: CANCELLED | OUTPATIENT
Start: 2024-07-31

## 2024-07-30 RX ORDER — ASPIRIN 81 MG/1
81 TABLET, CHEWABLE ORAL DAILY
Status: CANCELLED | OUTPATIENT
Start: 2024-07-31 | End: 2024-08-21

## 2024-07-30 RX ORDER — INSULIN LISPRO 100 [IU]/ML
0-4 INJECTION, SOLUTION INTRAVENOUS; SUBCUTANEOUS NIGHTLY
Status: DISPENSED | OUTPATIENT
Start: 2024-07-30

## 2024-07-30 RX ORDER — SODIUM CHLORIDE 0.9 % (FLUSH) 0.9 %
5-40 SYRINGE (ML) INJECTION EVERY 12 HOURS SCHEDULED
Status: CANCELLED | OUTPATIENT
Start: 2024-07-30

## 2024-07-30 RX ORDER — LOSARTAN POTASSIUM 25 MG/1
25 TABLET ORAL DAILY
Status: CANCELLED | OUTPATIENT
Start: 2024-07-31

## 2024-07-30 RX ORDER — SODIUM CHLORIDE 0.9 % (FLUSH) 0.9 %
5-40 SYRINGE (ML) INJECTION EVERY 12 HOURS SCHEDULED
Status: DISCONTINUED | OUTPATIENT
Start: 2024-07-30 | End: 2024-08-03

## 2024-07-30 RX ORDER — DEXTROSE MONOHYDRATE 100 MG/ML
INJECTION, SOLUTION INTRAVENOUS CONTINUOUS PRN
Status: CANCELLED | OUTPATIENT
Start: 2024-07-30

## 2024-07-30 RX ORDER — ROSUVASTATIN CALCIUM 20 MG/1
20 TABLET, COATED ORAL NIGHTLY
Status: DISPENSED | OUTPATIENT
Start: 2024-07-30

## 2024-07-30 RX ORDER — SODIUM CHLORIDE 9 MG/ML
INJECTION, SOLUTION INTRAVENOUS PRN
Status: CANCELLED | OUTPATIENT
Start: 2024-07-30

## 2024-07-30 RX ORDER — ENOXAPARIN SODIUM 100 MG/ML
40 INJECTION SUBCUTANEOUS EVERY EVENING
Status: DISPENSED | OUTPATIENT
Start: 2024-07-30

## 2024-07-30 RX ORDER — FOLIC ACID 1 MG/1
1 TABLET ORAL DAILY
Status: CANCELLED | OUTPATIENT
Start: 2024-07-31

## 2024-07-30 RX ORDER — ASPIRIN 300 MG/1
300 SUPPOSITORY RECTAL DAILY
Status: DISCONTINUED | OUTPATIENT
Start: 2024-07-31 | End: 2024-07-30

## 2024-07-30 RX ORDER — FOLIC ACID 1 MG/1
1 TABLET ORAL DAILY
Status: DISPENSED | OUTPATIENT
Start: 2024-07-31

## 2024-07-30 RX ORDER — LANOLIN ALCOHOL/MO/W.PET/CERES
100 CREAM (GRAM) TOPICAL DAILY
Status: CANCELLED | OUTPATIENT
Start: 2024-07-31

## 2024-07-30 RX ORDER — CARVEDILOL 6.25 MG/1
6.25 TABLET ORAL 2 TIMES DAILY WITH MEALS
Status: DISPENSED | OUTPATIENT
Start: 2024-07-30

## 2024-07-30 RX ORDER — SENNOSIDES A AND B 8.6 MG/1
1 TABLET, FILM COATED ORAL DAILY PRN
Status: ACTIVE | OUTPATIENT
Start: 2024-07-30

## 2024-07-30 RX ORDER — RANOLAZINE 500 MG/1
500 TABLET, EXTENDED RELEASE ORAL 2 TIMES DAILY
Status: DISPENSED | OUTPATIENT
Start: 2024-07-30

## 2024-07-30 RX ORDER — ZINC SULFATE 50(220)MG
50 CAPSULE ORAL DAILY
Status: CANCELLED | OUTPATIENT
Start: 2024-07-31

## 2024-07-30 RX ADMIN — EMPAGLIFLOZIN 20 MG: 10 TABLET, FILM COATED ORAL at 17:23

## 2024-07-30 RX ADMIN — ALOGLIPTIN 25 MG: 12.5 TABLET, FILM COATED ORAL at 17:23

## 2024-07-30 RX ADMIN — CLOPIDOGREL BISULFATE 75 MG: 75 TABLET ORAL at 07:53

## 2024-07-30 RX ADMIN — Medication 100 MG: at 07:53

## 2024-07-30 RX ADMIN — INSULIN LISPRO 4 UNITS: 100 INJECTION, SOLUTION INTRAVENOUS; SUBCUTANEOUS at 07:54

## 2024-07-30 RX ADMIN — CARVEDILOL 6.25 MG: 6.25 TABLET, FILM COATED ORAL at 07:53

## 2024-07-30 RX ADMIN — ENOXAPARIN SODIUM 40 MG: 100 INJECTION SUBCUTANEOUS at 17:25

## 2024-07-30 RX ADMIN — METFORMIN HYDROCHLORIDE 1000 MG: 500 TABLET, FILM COATED ORAL at 17:23

## 2024-07-30 RX ADMIN — FOLIC ACID 1 MG: 1 TABLET ORAL at 07:53

## 2024-07-30 RX ADMIN — SODIUM CHLORIDE, PRESERVATIVE FREE 10 ML: 5 INJECTION INTRAVENOUS at 07:55

## 2024-07-30 RX ADMIN — RANOLAZINE 500 MG: 500 TABLET, EXTENDED RELEASE ORAL at 20:47

## 2024-07-30 RX ADMIN — Medication 1 TABLET: at 07:54

## 2024-07-30 RX ADMIN — INSULIN LISPRO 1 UNITS: 100 INJECTION, SOLUTION INTRAVENOUS; SUBCUTANEOUS at 20:48

## 2024-07-30 RX ADMIN — ASPIRIN 81 MG 81 MG: 81 TABLET ORAL at 07:53

## 2024-07-30 RX ADMIN — ROSUVASTATIN CALCIUM 20 MG: 20 TABLET, FILM COATED ORAL at 20:47

## 2024-07-30 RX ADMIN — SODIUM CHLORIDE, PRESERVATIVE FREE 10 ML: 5 INJECTION INTRAVENOUS at 20:48

## 2024-07-30 RX ADMIN — ZINC SULFATE 220 MG (50 MG) CAPSULE 50 MG: CAPSULE at 07:53

## 2024-07-30 RX ADMIN — INSULIN LISPRO 8 UNITS: 100 INJECTION, SOLUTION INTRAVENOUS; SUBCUTANEOUS at 17:52

## 2024-07-30 RX ADMIN — LOSARTAN POTASSIUM 25 MG: 25 TABLET, FILM COATED ORAL at 07:54

## 2024-07-30 RX ADMIN — CARVEDILOL 6.25 MG: 6.25 TABLET, FILM COATED ORAL at 17:23

## 2024-07-30 RX ADMIN — RANOLAZINE 500 MG: 500 TABLET, EXTENDED RELEASE ORAL at 07:53

## 2024-07-30 RX ADMIN — INSULIN LISPRO 8 UNITS: 100 INJECTION, SOLUTION INTRAVENOUS; SUBCUTANEOUS at 12:41

## 2024-07-30 ASSESSMENT — PAIN SCALES - GENERAL: PAINLEVEL_OUTOF10: 0

## 2024-07-30 NOTE — PROGRESS NOTES
07/30/24 1501   Encounter Summary   Encounter Overview/Reason Follow-up   Service Provided For Patient and family together   Referral/Consult From Nurse   Support System Spouse;Family members   Last Encounter  07/30/24  ( received consult for \"Spiritual Distress\" for this PT. The Pt and his wife we in no distress as far as I could assess and seemed to be in a very good mood. Expressed gratitude for the visit. -Sreekanth)   Complexity of Encounter Low   Begin Time 1450   End Time  1504   Total Time Calculated 14 min   Spiritual/Emotional needs   Type Spiritual Support   Assessment/Intervention/Outcome   Assessment Calm;Hopeful;Peaceful   Intervention Active listening;Discussed meaning/purpose   Outcome Coping   Plan and Referrals   Plan/Referrals Continue to visit, (comment)

## 2024-07-30 NOTE — PLAN OF CARE
well as                bathroom activities and hygiene.  [] Adjustment   [x] Other:   Nursing interventions may include bowel/bladder training, education for medical assistive devices, medication education, O2 saturation management, energy conservation, stress management techniques, fall prevention, alarms protocol, seating and positioning, skin/wound care, pressure relief instruction,dressing changes,  infection protection, DVT prophylaxis, and/or assistance with in room safety with transfers to bed, toilet, wheelchair, shower as well as bathroom activities and hygiene.     Patient/caregiver education for:   [] Disease/sustained injury/management      [] Medication Use   [] Surgical intervention   [] Safety/Precautions   [] Body mechanics and or joint protection   [] Health maintenance       PHYSICAL THERAPY:  Goals:                  Short Term Goals  Time Frame for Short Term Goals: 14 days STG=LTG  Short Term Goal 1: Pt will perform bed mobiltiy with mod I  Short Term Goal 2: Pt will perform all functional transfers with mod I  Short Term Goal 3: Pt will perform gait with 2ww 50' on level surfaces and 10' on unlevel surfaces with mod I, 150' with supervision on level surfaces  Short Term Goal 4: Pt will negotiate curb step with 2ww and 12 steps with R rail ascending/L descending with supervision,  Short Term Goal 5: Pt will  light object from floor with 2ww and reacher with mod I               These goals were reviewed with this patient at the time of assessment and Franko Odom is in agreement.     Plan of Care: Pt to be seen 5 days per week for a minimum of 60 minutes for 14 days.                Current Treatment Recommendations: Strengthening, ROM, Balance training, Functional mobility training, Transfer training, ADL/Self-care training, IADL training, Cognitive/Perceptual training, Endurance training, Wheelchair mobility training, Gait training, Stair training, Neuromuscular re-education, Pain  PM      Dietician: Leilani Joseph RD, CHRISTIE 07/31/2024  14:00    ADMIT DATE:7/30/2024

## 2024-07-30 NOTE — DISCHARGE SUMMARY
V2.0  Discharge Summary    Name:  Franko Odom /Age/Sex: 1945 (78 y.o. male)   Admit Date: 2024  Discharge Date: 24    MRN & CSN:  [id number] & [id number] Encounter Date and Time 24 10:24 AM EDT    Attending:  Juanito Franco MD Discharging Provider: Juanito Franco MD       Hospital Course:     Brief HPI: Franko Odom is a 78 y.o. male who presented with pmh of hypertension, hyper lipidemia, diabetes, CAD status post CABG  who presents with Acute ischemic stroke (HCC)       Brief Problem Based Course:   # Acute ischemic stroke status post TNK: Presented with  dizziness, blurred/double vision, and balance issues associated with 1 episode of nonbilious none bloody emesis while in the waiting room.  Last known well less than 4 hours prior to arrival.  NIH stroke scale on admission 1,   CT head, CTA head and neck negative for any acute findings, status post TNK, admitted in ICU for every hour neurochecks.  MRI showed acute infarct in the right middle cerebellar peduncle neurology consulted, evaluated and started on aspirin, Plavix, continue rosuvastatin, PT/OT evaluated and recommended ARU, obtained echocardiogram once approved discharged patient in a stable condition to ARU.     # CAD s/p CABG: Hemodynamically stable,  # Diabetes: A1c 7.5% on 2024, continue sliding scale insulin, hypoglycemic protocol and diabetic diet  # Hypertension: On losartan and Coreg continue  # Hyperlipidemia: On rosuvastatin continue  # GERD: On PPI continue     Comment: Please note this report has been produced using speech recognition software and may contain errors related to that system including errors in grammar, punctuation, and spelling, as well as words and phrases that may be inappropriate. If there are any questions or concerns please feel free to contact the dictating provider for clarification.          The patient expressed appropriate understanding of, and agreement with the  4.3   CL 98* 102   CO2 22 21   BUN 19 24*   CREATININE 1.3 0.9   GLUCOSE 253* 169*     Hepatic:   Recent Labs     07/27/24  1723   AST 24   ALT 20   BILITOT 0.6   ALKPHOS 57     Lipids:   Lab Results   Component Value Date/Time    CHOL 140 07/28/2024 04:30 AM    HDL 40 07/28/2024 04:30 AM    TRIG 72 07/28/2024 04:30 AM     Hemoglobin A1C:   Lab Results   Component Value Date/Time    LABA1C 7.5 07/28/2024 08:30 AM     TSH: No results found for: \"TSH\"  Troponin: No results found for: \"TROPONINT\"  Lactic Acid: No results for input(s): \"LACTA\" in the last 72 hours.  BNP:   Recent Labs     07/27/24  1724   PROBNP 52.38     UA:  Lab Results   Component Value Date/Time    NITRU NEGATIVE 09/08/2016 07:45 AM    COLORU LT YELLOW 09/08/2016 07:45 AM    PHUR 5.0 09/08/2016 07:45 AM    WBCUA <1 09/08/2016 07:45 AM    RBCUA <1 09/08/2016 07:45 AM    BACTERIA NEGATIVE 09/08/2016 07:45 AM    CLARITYU CLEAR 09/08/2016 07:45 AM    LEUKOCYTESUR NEGATIVE 09/08/2016 07:45 AM    UROBILINOGEN NORMAL 09/08/2016 07:45 AM    BILIRUBINUR NEGATIVE 09/08/2016 07:45 AM    BLOODU NEGATIVE 09/08/2016 07:45 AM    GLUCOSEU >500 09/08/2016 07:45 AM    KETUA NEGATIVE 09/08/2016 07:45 AM     Urine Cultures: No results found for: \"LABURIN\"  Blood Cultures: No results found for: \"BC\"  No results found for: \"BLOODCULT2\"  Organism: No results found for: \"ORG\"    Time Spent Discharging patient 35 minutes    Electronically signed by Juanito Franco MD on 7/30/2024 at 10:24 AM

## 2024-07-30 NOTE — PROGRESS NOTES
07/30/24 1414   Encounter Summary   Encounter Overview/Reason Volunteer Encounter   Service Provided For Patient   Referral/Consult From Volunteer   Support System Family members   Last Encounter  07/30/24  (Visit by Spiritual Care Partner Rossi, charted by  Mohsen)   Complexity of Encounter Low   Begin Time 1000   End Time  1010   Total Time Calculated 10 min   Spiritual/Emotional needs   Type Spiritual Support   Rituals, Rites and Sacraments   Type Blessings   Assessment/Intervention/Outcome   Assessment Calm   Intervention Active listening   Outcome Coping   Plan and Referrals   Plan/Referrals Continue Support (comment)        Post-Care Instructions: I reviewed with the patient in detail post-care instructions. Patient is not to engage in any heavy lifting, exercise, or swimming for the next 14 days. Should the patient develop any fevers, chills, bleeding, severe pain patient will contact the office immediately.

## 2024-07-30 NOTE — PROGRESS NOTES
Physical Therapy    Physical Therapy Treatment Note  Name: Franko Odom MRN: [id number] :   1945   Date:  2024   Admission Date: 2024 Room:  [address] [place], [place] [id number]   Restrictions/Precautions:  Restrictions/Precautions  Restrictions/Precautions: General Precautions, Fall Risk         Communication with other providers:    Subjective:  Patient states:  agreeeable  Pain:   Location, Type, Intensity (0/10 to 10/10):  denies    Objective:    Observation:  in chair  Treatment, including education/measures:  STS and SPT /c Diallo/CGA using FWW  Gait training using FWW x~90' /c Diallo throughout for R SL instability. Small step length which allows pt to maintain better stability during gait cycle.  Safety  Patient left safely in the wheelchair, with call light, nurse prepping to take pt to ARU. Gait belt was used for transfers and gait.    Assessment / Impression:      Patient's tolerance of treatment:  good   Adverse Reaction: na  Significant change in status and impact:  na  Barriers to improvement:  weakness and endurance  Plan for Next Session:    Cont neuro re-ed, gait training                 Time in:  1320  Time out:  1332  Timed treatment minutes:  12  Total treatment time:  12    Previously filed items:  Social/Functional History  Lives With: Spouse  Type of Home: House  Home Layout: One level  Home Access: Stairs to enter with rails  Entrance Stairs - Number of Steps: 13  Bathroom Shower/Tub: Walk-in shower  Bathroom Toilet: Standard  Bathroom Equipment: Grab bars in shower  Home Equipment: Cane, Walker - Rolling  ADL Assistance: Independent  Ambulation Assistance: Independent  Transfer Assistance: Independent  Active : Yes     Long Term Goals  Time Frame for Long Term Goals : In one week:  Long Term Goal 1: Pt will complete all bed mobility with SBAx1  Long Term Goal 2: Pt will complete sit <> stand transfers with CGAx1  Long Term Goal 3: Pt will ambulate 100 feet with minAx1 with LRAD (chair follow

## 2024-07-30 NOTE — CARE COORDINATION
Per MD patient medically ready for discharge to ARU today.     Patient meets criteria and is approved to come to ARU.   Patient able to admit after ARU Medical Director and  sign the pre-admission screen (PAS).      Will notify ARU team of admission.

## 2024-07-30 NOTE — PROGRESS NOTES
ARU Admission Assessment - [facility]      A Complete drug regimen review was completed for this patient this date.   [x]  No clinically significant medication issue was identified   []  Yes, a clinically significant medication issue was identified     []  Adverse Drug Event:    []  Allergy:    []  Side Effect:    []  Ineffective Therapy:    []  Drug interaction:     []  Duplicate Therapy:    []  Untreated Indication:    []  Non-adherence:    []  Other:  Nursing contacted the physician:       Date:                Time:    Actions recommended by physician were completed:   Date:                 Time:  Action(s) Taken:             []  New Physician Order Received    []  Issue Noted by Physician; However No Action Required    []  Other:        Ethnicity  \"Are you of , /a, or Tajik origin?\"  Check all that apply:  [x] A.  No, not of , /a, or Tajik Origin  [] B.  Yes, Uruguayan, Uruguayan American, Chicano/a  [] C.  Yes, Samoan  [] D.  Yes, Ajit  [] E.  Yes, another , , or Tajik origin  [] X.  Patient unable to respond    Race  \"What is your race?\"  Check all that apply:  [x] A.  White  [] B.  Black or   [] C.   or   [] D.   Irish  [] E.  Chinese  [] F.  St [place]ian  [] G. Armenian  [] H.  Lithuanian  [] I.  Romanian  [] J.  Other   [] K.    [] L.  Sao Tomean or Faby  [] M.  South African  [] N.  Other   [] X.  Patient unable to respond    Language  A.  \"What is your preferred language?\"   English     B.  \"Do you need or want an  to communicate with a doctor or health care staff?\"  Check only one:  [x] 0.  No  [] 1.  Yes  [] 9.  Unable to determine    Transportation  \"In the past 6-12 months, has lack of transportation kept you from medical appointments, meetings, work, or from getting things needed for daily living?\"  Check all that apply:  [] A.  Yes, it has kept me from

## 2024-07-30 NOTE — PROGRESS NOTES
4 Eyes Skin Assessment     NAME:  Franko Odom  YOB: 1945  MEDICAL RECORD NUMBER:  [id number]    The patient is being assessed for  Admission    I agree that at least one RN has performed a thorough Head to Toe Skin Assessment on the patient. ALL assessment sites listed below have been assessed.      Areas assessed by both nurses:    Head, Face, Ears, Shoulders, Back, Chest, Arms, Elbows, Hands, Sacrum. Buttock, Coccyx, Ischium, and Legs. Feet and Heels        Does the Patient have a Wound? No noted wound(s)       Joseph Prevention initiated by RN: Yes  Wound Care Orders initiated by RN: No    Pressure Injury (Stage 3,4, Unstageable, DTI, NWPT, and Complex wounds) if present, place Wound referral order by RN under : No    New Ostomies, if present place, Ostomy referral order under : No     Nurse 1 eSignature: Electronically signed by Rosanna James RN on 7/30/24 at 6:41 PM EDT    **SHARE this note so that the co-signing nurse can place an eSignature**    Nurse 2 eSignature: Electronically signed by LEXIE JULIO on 7/30/24 at 7:55 PM EDT

## 2024-07-30 NOTE — PROGRESS NOTES
Neurology Service Progress Note  [facility]   Patient Name: Franko Odom  : 1945        Subjective:   Reason for consult: Stroke like symptoms s/p TNK  Chart was reviewed in detail, patient was seen and assessed. Patient continues to do well today. He is again up to chair. He tells me vision is intact. He continues to feel like his balance and coordination on right are impaired. Plan is for ARU at discharge.     Past Medical History:   Diagnosis Date    Acid reflux     Anemia     Arthritis     \"Wrists, Knees, Ankles And Spine\"    Belching     \"All The Time\"    CAD (coronary artery disease)     Sees Dr. Tara Cotter    Chronic back pain     Diabetes mellitus (HCC) Dx     \"I Go To [facility] For Diabetic Care, I See Georgia Toscano CNP\"    Difficult intubation     Diverticulitis Dx Early     Echocardiogram 2019    EF 55-60%, Grade 1 diastolic dysfunction, No signifiant valvular disease note. No pericardial effusion.    H/O echocardiogram 2016    The left ventricular size is normal. There is mild concentric LVH. Trace to mild MR     H/O exercise stress test 2016    Patient tolerated well without focal complaints. Heart rate response Appropriate. Normal findings.     H/O percutaneous left heart catheterization 2016    LAD 80%, Cx 80%, RCA 50% prox, PDA 90% mid    Hyperlipidemia     Hypertension     Lexiscan stress test 2019    EF 71%, Moderate size medium inferior ischemia. Abn stress    Mononucleosis Dx     Pleurisy Dx 1964    Ringing in the ears     S/P CABG x 4 2016    Lima to LAD & D1, SVG PDA & Cx M1    Shortness of breath on exertion     Wears glasses     :   Past Surgical History:   Procedure Laterality Date    CARDIAC CATHETERIZATION  2016    CARDIAC SURGERY  2016    CABG x 4 Lima to the LAD and D1, SVG to PDA, SVG to CX M1    COLONOSCOPY  Early     ENDOSCOPY, COLON, DIAGNOSTIC  Late     LEG SURGERY Right  Units 07/28/24 04:30   Cholesterol, Total <200 MG/   HDL Cholesterol >40 MG/DL 40 (L)   LDL Cholesterol <100 MG/DL 86   Triglycerides <150 MG/DL 72   (L): Data is abnormally low  Last Liver Function Results:      Latest Reference Range & Units 07/27/24 17:23   Albumin 3.4 - 5.0 GM/DL 4.3   Alkaline Phosphatase 40 - 129 IU/L 57   ALT 10 - 40 U/L 20   AST 15 - 37 IU/L 24   Total Bilirubin 0.0 - 1.0 MG/DL 0.6   Total Protein 6.4 - 8.2 GM/DL 8.6 (H)   (H): Data is abnormally high    IMAGING:    CT head w/o contrast:  IMPRESSION:  1.No acute intracranial abnormality.    CTA head and neck:   No critical stenosis, aneurysm or occlusion identified.    MRI brain w/o contrast:  IMPRESSION:     Acute infarct in the right middle cerebellar peduncle.     Chronic microvascular ischemia.    All imaging was personally reviewed      ASSESSMENT/PLAN:   Patient is doing well today. Reports continued resolution of diplopia, no evidence of nystagmus on exam. Does have right facial weakness and mild slurred/bulbar speech. Does feel unsteady with ambulation and feels less coordinated with left extremities.    Acute onset ataxia, nystagmus and binocular diplopia secondary to acute stroke s/p TNK  Neuro imaging as above  CT head w/o contrast - non acute  CTA head and neck no LVO  MRI brain w/o contrast - acute infarct in the right middle cerebellar peduncle  Continue DAPT x 21 days followed by monotherapy with Plavix alone  Verify now  566 patient is not a responder  Continue rosuvastatin 40 mg for secondary stroke prevention  LDL 86, goal < 70, agree with increased dose  Echocardiogram pending final read  PT/OT/ST as recommended  Plan for ARU at discharge  From neurology standpoint patient is stable to transfer out of ICU  Stroke risk factors: HTN, HLD, DM  A1C 7.5  Follow up in stroke clinic at discharge, referral placed and AVS updated  No further recommendations, we will sign off at this time, please contact us with any new

## 2024-07-30 NOTE — H&P
Physical Medicine and Rehabilitation H&P    Name:  Franko Odom Date/Time of Admission: 2024  1:50 PM    CSN: [id number] Attending Provider: Omer Vail MD   Room/Bed:  1019/1019-A : 1945 Age: 78 y.o.   Date: 2024 Time: 3:58 PM     ADMITTING DIAGNOSIS/REASON FOR REHAB ADMISSION:   Acute right middle cerebellar peduncle stroke    COMORBID DIAGNOSES IMPACTING REHAB:  CAD, chronic back pain, diabetes, hyperlipidemia, hypertension    CHIEF COMPLAINT:  I had a stroke    HPI  Franko Odom is a 78 y.o. right-handed male who is admitted to the [facility] Acute Rehabilitation Unit. Franko Odom first presented to the ED on 2024 with dizziness and blurred vision.  He threw up in the ER.  He was found to have some nystagmus and stroke alert was paged.  His NIH was 0.  Neurology was consulted with telestroke and they recommended TNK.  TNK was ordered and initiated.  Patient transferred to [place] for neurocritical care after TNK.  CT of the head was unremarkable.  Patient transferred to [facility] for critical care.  MRI showed acute infarct in the right middle cerebellar peduncle.  Neurology started the patient on aspirin and Plavix and continue the rosuvastatin.  Patient was found to be in aspirin nonresponder and Plavix was continued.  He was otherwise medically stable and his symptoms were improving.  He was evaluated by therapy.    Franko Odom was evaluated by therapy and was found to have significant functional deficits warranting IRF admission. IRF admission was deemed to be reasonable and necessary. Franko Odom was admitted to [facility]'s Acute Rehabilitation Unit on 2024  1:50 PM.     Now, patient seen in ARU with wife present.  He reports his balance remains poor.  He reports his right side still feels off.  He denies any pain.  He reports eating well and sleeping well.  He had a recent BM.  He denies any dysuria.  He denies any    Prostate Cancer    Heart Disease Father         Open Heart Surgery    Heart Disease Brother     Arthritis Brother     Diabetes Son     Other Son         \"2 Surgeries On Pancreas\"     SOCIAL HISTORY  Social History     Socioeconomic History    Marital status:    Tobacco Use    Smoking status: Former     Current packs/day: 0.00     Average packs/day: 1.5 packs/day for 6.0 years (9.0 ttl pk-yrs)     Types: Cigarettes     Start date:      Quit date: 1970     Years since quittin.6    Smokeless tobacco: Never   Vaping Use    Vaping Use: Never used   Substance and Sexual Activity    Alcohol use: Yes     Comment: \"Very Seldom, Maybe Once Every Month Or More\"    Drug use: No    Sexual activity: Not Currently     Social Determinants of Health     Food Insecurity: No Food Insecurity (2024)    Hunger Vital Sign     Worried About Running Out of Food in the Last Year: Never true     Ran Out of Food in the Last Year: Never true   Transportation Needs: No Transportation Needs (2024)    PRAPARE - Transportation     Lack of Transportation (Medical): No     Lack of Transportation (Non-Medical): No   Housing Stability: Low Risk  (2024)    Housing Stability Vital Sign     Unable to Pay for Housing in the Last Year: No     Number of Places Lived in the Last Year: 1     Unstable Housing in the Last Year: No   , lives with wife in a 1 level home but does have 13 steps to enter from the garage.  Wife cannot provide much physical assistance.  Patient with minimal DME.    SURGICAL HISTORY  Past Surgical History:   Procedure Laterality Date    CARDIAC CATHETERIZATION  2016    CARDIAC SURGERY  2016    CABG x 4 Lima to the LAD and D1, SVG to PDA, SVG to CX M1    COLONOSCOPY  Early     ENDOSCOPY, COLON, DIAGNOSTIC  Late     LEG SURGERY Right     Gangrene Right Lower Leg    OTHER SURGICAL HISTORY Bilateral Last Done In     \"Multiple Ear Surgeries To Patch Ear Drum\"

## 2024-07-31 LAB
GLUCOSE BLD-MCNC: 164 MG/DL (ref 70–99)
GLUCOSE BLD-MCNC: 166 MG/DL (ref 70–99)
GLUCOSE BLD-MCNC: 181 MG/DL (ref 70–99)
GLUCOSE BLD-MCNC: 181 MG/DL (ref 70–99)

## 2024-07-31 PROCEDURE — 82962 GLUCOSE BLOOD TEST: CPT

## 2024-07-31 PROCEDURE — 97163 PT EVAL HIGH COMPLEX 45 MIN: CPT

## 2024-07-31 PROCEDURE — 1280000000 HC REHAB R&B

## 2024-07-31 PROCEDURE — 94761 N-INVAS EAR/PLS OXIMETRY MLT: CPT

## 2024-07-31 PROCEDURE — 6360000002 HC RX W HCPCS: Performed by: PHYSICAL MEDICINE & REHABILITATION

## 2024-07-31 PROCEDURE — 99232 SBSQ HOSP IP/OBS MODERATE 35: CPT | Performed by: PHYSICAL MEDICINE & REHABILITATION

## 2024-07-31 PROCEDURE — 6370000000 HC RX 637 (ALT 250 FOR IP): Performed by: STUDENT IN AN ORGANIZED HEALTH CARE EDUCATION/TRAINING PROGRAM

## 2024-07-31 PROCEDURE — 6370000000 HC RX 637 (ALT 250 FOR IP)

## 2024-07-31 PROCEDURE — 97116 GAIT TRAINING THERAPY: CPT

## 2024-07-31 PROCEDURE — 97167 OT EVAL HIGH COMPLEX 60 MIN: CPT

## 2024-07-31 PROCEDURE — 2580000003 HC RX 258: Performed by: STUDENT IN AN ORGANIZED HEALTH CARE EDUCATION/TRAINING PROGRAM

## 2024-07-31 PROCEDURE — 97530 THERAPEUTIC ACTIVITIES: CPT

## 2024-07-31 PROCEDURE — 92523 SPEECH SOUND LANG COMPREHEN: CPT

## 2024-07-31 PROCEDURE — 97535 SELF CARE MNGMENT TRAINING: CPT

## 2024-07-31 RX ADMIN — Medication 1 TABLET: at 08:27

## 2024-07-31 RX ADMIN — ALOGLIPTIN 25 MG: 12.5 TABLET, FILM COATED ORAL at 08:23

## 2024-07-31 RX ADMIN — CLOPIDOGREL BISULFATE 75 MG: 75 TABLET ORAL at 08:23

## 2024-07-31 RX ADMIN — METFORMIN HYDROCHLORIDE 1000 MG: 500 TABLET, FILM COATED ORAL at 17:36

## 2024-07-31 RX ADMIN — CARVEDILOL 6.25 MG: 6.25 TABLET, FILM COATED ORAL at 08:23

## 2024-07-31 RX ADMIN — SODIUM CHLORIDE, PRESERVATIVE FREE 10 ML: 5 INJECTION INTRAVENOUS at 20:22

## 2024-07-31 RX ADMIN — ZINC SULFATE 220 MG (50 MG) CAPSULE 50 MG: CAPSULE at 08:22

## 2024-07-31 RX ADMIN — EMPAGLIFLOZIN 20 MG: 10 TABLET, FILM COATED ORAL at 12:25

## 2024-07-31 RX ADMIN — Medication 100 MG: at 08:22

## 2024-07-31 RX ADMIN — ASPIRIN 81 MG 81 MG: 81 TABLET ORAL at 08:22

## 2024-07-31 RX ADMIN — RANOLAZINE 500 MG: 500 TABLET, EXTENDED RELEASE ORAL at 20:22

## 2024-07-31 RX ADMIN — ENOXAPARIN SODIUM 40 MG: 100 INJECTION SUBCUTANEOUS at 17:36

## 2024-07-31 RX ADMIN — ROSUVASTATIN CALCIUM 20 MG: 20 TABLET, FILM COATED ORAL at 20:22

## 2024-07-31 RX ADMIN — CARVEDILOL 6.25 MG: 6.25 TABLET, FILM COATED ORAL at 17:36

## 2024-07-31 RX ADMIN — FOLIC ACID 1 MG: 1 TABLET ORAL at 08:22

## 2024-07-31 RX ADMIN — RANOLAZINE 500 MG: 500 TABLET, EXTENDED RELEASE ORAL at 08:22

## 2024-07-31 RX ADMIN — METFORMIN HYDROCHLORIDE 1000 MG: 500 TABLET, FILM COATED ORAL at 08:23

## 2024-07-31 RX ADMIN — LOSARTAN POTASSIUM 25 MG: 25 TABLET, FILM COATED ORAL at 08:23

## 2024-07-31 NOTE — PROGRESS NOTES
within 5 days  Temporal Orientation: Day: Correct  Able to recall \"sock”: Yes, no cue required  Able to recall \"blue\": Yes, no cue required  Able to recall \"bed\": Yes, after cueing (\"a piece of furniture\")  BIMS Summary Score: 14  IRF-CAM (Confusion Assessment Method): Confusion Assessment Method (CAM)  Is there evidence of an acute change in mental status from the patient's baseline?: No  Inattention: Behavior not present  Disorganized thinking: Behavior not present  Altered level of consciousness: Behavior not present    Objective:  Observation/Palpation  Observation: Pt in recliner on approach, quite pleasant and motivated to participate.  Body Mechanics: R lateral lean c dynamic tasks in stance especially             Vision  Vision Exceptions: Wears glasses at all times  Vision - Basic Assessment  Vision Comments: Pt reports diplopia he was initially experiencing has resolved, only intermittently notices slight blurriness of vision  Hearing  Hearing: Exceptions to WFL  Hearing Exceptions: Hard of hearing/hearing concerns, Bilateral hearing aid    ROM:      LUE AROM (degrees)  LUE AROM : WFL     Left Hand AROM (degrees)  Left Hand AROM: WFL     RUE AROM (degrees)  RUE AROM : WFL     Right Hand AROM (degrees)  Right Hand AROM: WFL    Strength:    LUE Strength  Gross LUE Strength: WFL  L Hand General: 4+/5  LUE Strength Comment: 4+/5 grossly  RUE Strength  Gross RUE Strength: WFL  R Hand General: 5/5  RUE Strength Comment: 5/5 grossly    Quality of Movement:  Tone RUE  RUE Tone: Normotonic  Tone LUE  LUE Tone: Normotonic  Coordination  Movements Are Fluid And Coordinated: No  Coordination and Movement Description: Fine motor impairments, Decreased speed, Decreased accuracy, Right UE  Quality of Movement Other  Comment: Pt is R hand dominant.  Educated on importance of forced use RUE.  Pt's finger to nose testing accurate with vision occluded, however in function dysmetria repeatedly observed    Administered [address]

## 2024-07-31 NOTE — DISCHARGE INSTR - ACTIVITY
[facility] Stroke Survivor and Caregiver Support Group  First Thursday of each month  5:30 PM - 8:00 PM [place]  Ava [email or web address]  [facility]

## 2024-07-31 NOTE — PROGRESS NOTES
Comprehensive Nutrition Assessment    Type and Reason for Visit:  Initial, Consult (general nutrition management)    Nutrition Recommendations/Plan:   Continue carb controlled diet   Will continue to follow up during stay      Malnutrition Assessment:  Malnutrition Status:  No malnutrition (07/31/24 0628)    Context:  Acute Illness       Nutrition Assessment:    Admit to acute rehab unit with CVA. Able to tolerate regular diet consistency, thin liquids as per speech therapy. Currently on carb controlled diet with hx DM. Meal intake % so far. Reports good appetite, usually eats 3 meals each day. No significant weight loss in past 7 months. Some weight loss after CABG with change to diet. Patient content with current meals at this time. Will follow at low nutrition risk. May offer higher calorie meal plan as needed.    Nutrition Related Findings:    up in chair after lunch, hx DM, CAD, HTN  HbA1c 7.5%   glucose POCT some over 200 mg/dL   meds noted: OHAs, folvite, insulin, multivitamin, zinc, thiamine Wound Type: None       Current Nutrition Intake & Therapies:    Average Meal Intake: %  Average Supplements Intake: None Ordered  ADULT DIET; Regular; 4 carb choices (60 gm/meal)    Anthropometric Measures:  Height: 180 cm (5' 10.87\")  Ideal Body Weight (IBW): 171 lbs (78 kg)       Current Body Weight: 94.8 kg (208 lb 15.9 oz), 122.2 % IBW. Weight Source: Bed Scale  Current BMI (kg/m2): 29.3  Usual Body Weight: 97.5 kg (215 lb) (Dec 2023)  % Weight Change (Calculated): -2.8  Weight Adjustment For: No Adjustment                 BMI Categories: Overweight (BMI 25.0-29.9)    Estimated Daily Nutrient Needs:  Energy Requirements Based On: Formula  Weight Used for Energy Requirements: Current  Energy (kcal/day): 9388-5890 (miffl[address] st jeor)  Weight Used for Protein Requirements: Ideal  Protein (g/day): 78-94 (1-1.2 g/kg)  Method Used for Fluid Requirements: 1 ml/kcal  Fluid (ml/day): 2000    Nutrition Diagnosis:

## 2024-07-31 NOTE — PROGRESS NOTES
Physical Therapy  [facility] ARU PHYSICAL THERAPY EVALUATION    Chart Review:  Past Medical History:   Diagnosis Date    Acid reflux     Anemia     Arthritis     \"Wrists, Knees, Ankles And Spine\"    Belching     \"All The Time\"    CAD (coronary artery disease)     Sees Dr. Tara Cotter    Chronic back pain     Diabetes mellitus (HCC) Dx 1990's    \"I Go To [facility] For Diabetic Care, I See Georgia Toscano CNP\"    Difficult intubation     Diverticulitis Dx Early 1970's    Echocardiogram 01/22/2019    EF 55-60%, Grade 1 diastolic dysfunction, No signifiant valvular disease note. No pericardial effusion.    H/O echocardiogram 07/25/2016    The left ventricular size is normal. There is mild concentric LVH. Trace to mild MR     H/O exercise stress test 07/25/2016    Patient tolerated well without focal complaints. Heart rate response Appropriate. Normal findings.     H/O percutaneous left heart catheterization 09/02/2016    LAD 80%, Cx 80%, RCA 50% prox, PDA 90% mid    Hyperlipidemia     Hypertension     Lexiscan stress test 01/22/2019    EF 71%, Moderate size medium inferior ischemia. 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    Lima to LAD & D1, SVG PDA & Cx M1    Shortness of breath on exertion     Wears glasses      Past Surgical History:   Procedure Laterality Date    CARDIAC CATHETERIZATION  09/02/2016    CARDIAC SURGERY  09/13/2016    CABG x 4 Lima to the LAD and D1, SVG to PDA, SVG to CX M1    COLONOSCOPY  Early 1970's    ENDOSCOPY, COLON, DIAGNOSTIC  Late 1980's    LEG SURGERY Right 2000's    Gangrene Right Lower Leg    OTHER SURGICAL HISTORY Bilateral Last Done In 1990's    \"Multiple Ear Surgeries To Patch Ear Drum\"    WRIST SURGERY Right 1998 Or 1999    \"Lump Removed Top Of Right Wrist, Pinched Ulnar Nerve Also Done\"     Fall History: Has the patient had two or more falls in the past year or any fall with injury in the past year?: No  Social History:   will perform all functional transfers with mod I  Short Term Goal 3: Pt will perform gait with 2ww 50' on level surfaces and 10' on unlevel surfaces with mod I, 150' with supervision on level surfaces  Short Term Goal 4: Pt will negotiate curb step with 2ww and 12 steps with R rail ascending/L descending with supervision,  Short Term Goal 5: Pt will  light object from floor with 2ww and reacher with mod I     Plan:       Pt will be seen at least 60 minutes per day for a minimum of 5 days per week, plus group therapy as appropriate  Physical Therapy Plan  Current Treatment Recommendations: Strengthening, ROM, Balance training, Functional mobility training, Transfer training, ADL/Self-care training, IADL training, Cognitive/Perceptual training, Endurance training, Wheelchair mobility training, Gait training, Stair training, Neuromuscular re-education, Pain management, Positioning, Equipment evaluation, education, & procurement, Patient/Caregiver education & training, Safety education & training, Home exercise program, Therapeutic activities, Group Therapy    PT Individual Minutes  Time In: 1100  Time Out: 1200  Minutes: 60                 PT  Individual Evaluation 15   Individual tx performed as shown below   Gait Training 30   Therapeutic Exercise    Neuro Re-Ed    Therapeutic Activity 15   Wheelchair Propulsion    Group    Variance and Reason    TOTAL 60       Electronically signed by:    Rachelle Calderon, PT, PT   7/31/2024, @NOW

## 2024-07-31 NOTE — PROGRESS NOTES
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  DATE ONSET: 24    Date of Eval: 2024   Evaluating Therapist: RONNY Recio    RECENT RESULTS  CT OF HEAD/MRI:  FINDINGS:     Focal area of restricted diffusion in the right middle cerebellar peduncle. Mild  scattered areas of T2/FLAIR intensity in the supratentorial white matter,  chronic.     The ventricles are normal in size.  There are no extra-axial fluid collections.   There are no intracranial masses.  There are no bony lesions.  The sinuses are  clear.     IMPRESSION:     Acute infarct in the right middle cerebellar peduncle.     Chronic microvascular ischemia.    Primary Complaint: No complaints voiced    Pain:  Pain Assessment  Pain Assessment: None - Denies Pain  Pain Level: 0    Vision/ Hearing  Vision  Vision Exceptions: Wears glasses at all times  Hearing  Hearing Exceptions: Hard of hearing/hearing concerns;Bilateral hearing aid    Assessment:  Per PAS: Franko Odom, 78 y.o. -male with h/o HTN, DM, and CAD who presented to [facility] on   from [facility]. Patient presented with blurred vision, dizziness, and ataxia.  Per patient he was [place]  dr[place] a [place]. He went to the restroom had a bowel movement and when he came back around 445 pm he was having difficulty walking and double vision. He states he sat down and when he went to get up felt like he was going to fall. He noticed when watching TV everything was double.  Patient was given TNK at [facility] last night  Mental Index Score:   MIS scoring Total  9     Number of words recalled spontaneously   2  multiplied by   3  6     Number of words recalled with a category cue   1  multiplied by   2  2     Number of words recalled with a multiple choice cue   2   multiplied by   1   1     Total MIS (add all points)   9/15          Franko Odom scored  25/30 indicating mild memory deficits for new info.  Strengths: language, mental flexibility, reading, writing, calculations, visual spatial fx  Weaknesses:  STM for new info.               Prognosis:  Speech Therapy Prognosis  Prognosis: Good  Prognosis Considerations: Potential;Participation Level;Previous Level of Function;Family/Community Support  Additional Comments: spouse present during eval    Education:  Patient Education: Results and recommendations; rehab expectations  Patient Education Response: Verbalizes understanding          Therapy Time:   Individual Concurrent Group Co-treatment   Time In 1410         Time Out 1440         Minutes 30                 Electronically signed by Bell Das MA, CCC-SLP on 7/31/2024 at 3:19 PM

## 2024-07-31 NOTE — PLAN OF CARE
Problem: Discharge Planning  Goal: Discharge to home or other facility with appropriate resources  7/30/2024 2052 by Namita Najera  Outcome: Progressing     Problem: Safety - Adult  Goal: Free from fall injury  7/31/2024 1000 by Rosanna James RN  Outcome: Progressing  7/30/2024 2052 by Namita Najera  Outcome: Progressing     Problem: Pain  Goal: Verbalizes/displays adequate comfort level or baseline comfort level  7/31/2024 1000 by Rosanna James RN  Outcome: Progressing  7/30/2024 2052 by Namita Najera  Outcome: Progressing

## 2024-07-31 NOTE — PROGRESS NOTES
Physical Medicine and Rehabilitation Progress Note    Name:  Franko Odom Date/Time of Admission: 2024  1:50 PM    CSN: [id number] Attending Provider: Omer Vail MD   Room/Bed:  1019/1019-A : 1945 Age: 78 y.o.   Date: 2024 Time: 12:55 PM     IMPRESSION  Franko Odom is a 78 y.o. male with acute right middle cerebellar peduncle stroke leading to right-sided coordination loss and nystagmus     Strengths for the patient: Was independent prior to stroke, initial NIHSS was low, short hospital stay, some degree of support system by living with wife     Limitations/barriers for the patient: Brainstem strokes may have limited ability to fully recover     Comorbid Conditions:  CAD status post CABG  Diabetes  Hypertension  Hyperlipidemia  GERD  Chronic back pain        PLAN  Functional Impairments -Acute inpatient rehabilitation to proceed with PT/OT/ minutes 5 out of every 7 days. Work on gait, transfers, ADLs, iADLs, safety awareness, equipment management, medication management, patient/family teaching.  Caregiver training will be offered. Expected length of stay prior to a supervised level of function for discharge home with a walker and [facility] OT/PT is 10 days  Brainstem stroke - no primary precipitant found, continue aspirin and Plavix x 21 days for DAPT then Plavix monotherapy, patient found to be in aspirin nonresponder, continue Crestor as well  Diabetes - monitor Accu-Cheks, patient on alogliptin, Jardiance, and metformin.  May need to add sliding scale  Hypertension - minimal need for any permissive hypertension due to small stroke location, continue Coreg and losartan, monitor BP with therapy as well  CAD s/p CABG history - continue Plavix and Crestor long-term, continue Ranexa as well  Bowel/Bladder - currently no issues. Monitor for constipation.  DVT PPx - on Lovenox, increased risk until more ambulatory, early/progressive ambulation.   Disposition - Follow-up with PCP, stroke

## 2024-07-31 NOTE — CARE COORDINATION
Case Management Admission Note    Patient:Franko Odom      :1945  MRN:[id number]  Rehab Dx/Hx: Cerebral infarction, unspecified [I63.9]  Acute CVA (cerebrovascular accident) (HCC) [I63.9]    Chief Complaint:   Past Medical History:   Diagnosis Date    Acid reflux     Anemia     Arthritis     \"Wrists, Knees, Ankles And Spine\"    Belching     \"All The Time\"    CAD (coronary artery disease)     Sees Dr. Tara Cotter    Chronic back pain     Diabetes mellitus (HCC) Dx     \"I Go To [facility] For Diabetic Care, I See Georgia Toscano CNP\"    Difficult intubation     Diverticulitis Dx Early     Echocardiogram 2019    EF 55-60%, Grade 1 diastolic dysfunction, No signifiant valvular disease note. No pericardial effusion.    H/O echocardiogram 2016    The left ventricular size is normal. There is mild concentric LVH. Trace to mild MR     H/O exercise stress test 2016    Patient tolerated well without focal complaints. Heart rate response Appropriate. Normal findings.     H/O percutaneous left heart catheterization 2016    LAD 80%, Cx 80%, RCA 50% prox, PDA 90% mid    Hyperlipidemia     Hypertension     Lexiscan stress test 2019    EF 71%, Moderate size medium inferior ischemia. Abn stress    Mononucleosis Dx     Pleurisy Dx     Ringing in the ears     S/P CABG x 4 2016    [place] to LAD & D1, SVG PDA & Cx M1    Shortness of breath on exertion     Wears glasses      Past Surgical History:   Procedure Laterality Date    CARDIAC CATHETERIZATION  2016    CARDIAC SURGERY  2016    CABG x 4 [place] to the LAD and D1, SVG to PDA, SVG to CX M1    COLONOSCOPY  Early     ENDOSCOPY, COLON, DIAGNOSTIC  Late     LEG SURGERY Right 's    Gangrene Right Lower Leg    OTHER SURGICAL HISTORY Bilateral Last Done In     \"Multiple Ear Surgeries To Patch Ear Drum\"    WRIST SURGERY Right  Or     \"Lump Removed Top Of Right Wrist, Pinched Ulnar Nerve  Also Done\"     Allergies   Allergen Reactions    Exenatide Itching and Rash     Byetta injection    Lisinopril Hives    Sulfa Antibiotics Rash     Precautions: falls    Date of Admit: 7/30/2024  Room #: 1019/1019-A    Current functional status at time of admit:  Home Living/DME Available:  Type of Home: House  Home Access: Stairs to enter with rails  Bathroom Shower/Tub: Walk-in shower  Bathroom Toilet: Handicap height (uses nearby vanity to assist with transfer)  Bathroom Equipment: Grab bars in shower  Home Equipment: Walker - Rolling (RW is wife's (she's not using))    IADL Hx:  Homemaking Responsibilities: Yes  Active : Yes  Mode of Transportation: Truck  Occupation: Retired  Leisure & Hobbies: [facility], works on lawnmowers, yardwork    Spouse: Georgia  Family: Patient reported that his family is supportive. Son works full-time.     Patient's Goal: work on \"right side weakness\" and return home     Would you like Case Management to discuss the discharge plan with any other family members/significant others, and if so, who? \"my wife\"    Family's Goal: MAHENDRA spoke with patient's spouse. Her goal is for patient to \"get better, work on stairs, and build energy\" and return home.     MAHENDRA met with patient for admission assessment. Patient lives with spouse and son in a 2 level home in [place]. Laundry is in the basement. There is a level entry from garage to basement and then 13 steps to enter the main level of home. Patient has PCP, was independent in ADLs PTA, and uses [facility] in [place] for prescriptions. Home DME includes a 2WW (belongs to spouse) and no pre-existing HHC. Patient reports access to food, utilities, and shelter and feels safe in their home environment. BIMS completed in initial assessment. Room whiteboard updated.     Discussed the required 3 hours of therapy a day and the average length of stay is 7 to 11 days, could vary depending on recommendations of the interdisciplinary

## 2024-07-31 NOTE — PATIENT CARE CONFERENCE
ACUTE REHAB TEAM CONFERENCE SUMMARY   [facility]    NAME: Franko Odom  : 1945 ADMIT DATE: 2024    Rehab Admitting Dx:Cerebral infarction, unspecified [I63.9]  Acute CVA (cerebrovascular accident) (HCC) [I63.9]  Patient Comorbid Conditions:   Active Hospital Problems    Diagnosis Date Noted    Acute CVA (cerebrovascular accident) (HCC) [I63.9] 2024     Date: 2024    CASE MANAGEMENT  Current issues/needs regarding patient and family discharge status: 2 level, level entry from garage to basement and then 13 steps to enter the main level of home, Home DME: 2WW   Patient and family goal: Home w/ spouse & son    PHYSICAL THERAPY (Updated in QI)  Short Term Goals  Time Frame for Short Term Goals: 14 days STG=LTG  Short Term Goal 1: Pt will perform bed mobiltiy with mod I  Short Term Goal 2: Pt will perform all functional transfers with mod I  Short Term Goal 3: Pt will perform gait with 2ww 50' on level surfaces and 10' on unlevel surfaces with mod I, 150' with supervision on level surfaces  Short Term Goal 4: Pt will negotiate curb step with 2ww and 12 steps with R rail ascending/L descending with supervision,  Short Term Goal 5: Pt will  light object from floor with 2ww and reacher with mod I          Impairments/deficits, barriers:    Body Structures, Functions, Activity Limitations Requiring Skilled Therapeutic Intervention: Decreased functional mobility , Decreased endurance, Decreased ADL status, Decreased cognition, Decreased coordination, Decreased balance, Decreased high-level IADLs, Decreased strength, Decreased safe awareness, Decreased body mechanics, Decreased sensation, Decreased posture     Therapy Prognosis: Good  Decision Making: High Complexity  Clinical Presentation: unpredictable characteristics  Equipment needed at discharge:2ww      PT IRF-LY scores since initial assessment  Bed Mobility:   Roll Left and Right  Assistance Needed:  bladder     [] Is incontinent of bowel and bladder    [x] Has had an adequate number of bowel movements   [] Urinates with no urinary retention >300ml in bladder   [] Targeting bladder protocol with bailey removal   [x] Maintaining O2 SATs at 92% or greater   [x] Has pain managed while on ARU         [] Has had no skin breakdown while on ARU   [] Has improved skin integrity via wound measurements   [] Has no signs/symptoms of infection at the wound site   [] Pressure wounds Stage/Location:    [] Arrived on unit with pressure wound  [x] Has been free from injury during hospitalization   [] Has experienced a fall during hospitalization  [] Ongoing education with patient/family with understanding demonstrated for:  [] Receives IV Fluids  [] Other:        NUTRITION  Weight - Scale: 94.8 kg (208 lb 15.9 oz) / Body mass index is 29.26 kg/m².  Current diet: ADULT DIET; Regular; 4 carb choices (60 gm/meal)  Intake: Good appetite, meal intake %      Medical improvements/barriers: R lean, Coord issues, reduced R STR, balance control on turns reduced, ataxia          Team goals for next treatment period/Intervention for current barriers:   [x] Pt will increase activity tolerance for daily tasks.  [] Pt will improve bed mobility with reduced assist.  [x] Pt will improve safety in fx tasks with reduced cues/assist  [x] Pt will improve transfers with reduced assist  [x] Pt will improve toileting with reduced assist  [x] Pt will improve ADL's with use of adaptive equipment with reduced assist  [] Pt will improve pain mgmt for maximum participation in tx program  [] Pt will improve communication to get basic needs met on unit  [] Pt will improve swallowing for safe diet advancement with use of strategies  []  Plan for discharge to home.  [x]  Overall team goal being targeted this week: Pt will improve balance in mobility tasks with reduced R lean.     Patient Strengths: Family support, Motivated, Cooperative, and

## 2024-08-01 LAB
GLUCOSE BLD-MCNC: 141 MG/DL (ref 70–99)
GLUCOSE BLD-MCNC: 150 MG/DL (ref 70–99)
GLUCOSE BLD-MCNC: 168 MG/DL (ref 70–99)

## 2024-08-01 PROCEDURE — 94664 DEMO&/EVAL PT USE INHALER: CPT

## 2024-08-01 PROCEDURE — 97116 GAIT TRAINING THERAPY: CPT

## 2024-08-01 PROCEDURE — 6360000002 HC RX W HCPCS: Performed by: PHYSICAL MEDICINE & REHABILITATION

## 2024-08-01 PROCEDURE — 97112 NEUROMUSCULAR REEDUCATION: CPT

## 2024-08-01 PROCEDURE — 6370000000 HC RX 637 (ALT 250 FOR IP): Performed by: STUDENT IN AN ORGANIZED HEALTH CARE EDUCATION/TRAINING PROGRAM

## 2024-08-01 PROCEDURE — 2580000003 HC RX 258: Performed by: STUDENT IN AN ORGANIZED HEALTH CARE EDUCATION/TRAINING PROGRAM

## 2024-08-01 PROCEDURE — 6370000000 HC RX 637 (ALT 250 FOR IP)

## 2024-08-01 PROCEDURE — 94150 VITAL CAPACITY TEST: CPT

## 2024-08-01 PROCEDURE — 97110 THERAPEUTIC EXERCISES: CPT

## 2024-08-01 PROCEDURE — 82962 GLUCOSE BLOOD TEST: CPT

## 2024-08-01 PROCEDURE — 94761 N-INVAS EAR/PLS OXIMETRY MLT: CPT

## 2024-08-01 PROCEDURE — 99232 SBSQ HOSP IP/OBS MODERATE 35: CPT | Performed by: PHYSICAL MEDICINE & REHABILITATION

## 2024-08-01 PROCEDURE — 1280000000 HC REHAB R&B

## 2024-08-01 PROCEDURE — 97535 SELF CARE MNGMENT TRAINING: CPT

## 2024-08-01 RX ADMIN — FOLIC ACID 1 MG: 1 TABLET ORAL at 07:58

## 2024-08-01 RX ADMIN — ALOGLIPTIN 25 MG: 12.5 TABLET, FILM COATED ORAL at 07:57

## 2024-08-01 RX ADMIN — RANOLAZINE 500 MG: 500 TABLET, EXTENDED RELEASE ORAL at 07:58

## 2024-08-01 RX ADMIN — Medication 100 MG: at 07:58

## 2024-08-01 RX ADMIN — RANOLAZINE 500 MG: 500 TABLET, EXTENDED RELEASE ORAL at 20:51

## 2024-08-01 RX ADMIN — CLOPIDOGREL BISULFATE 75 MG: 75 TABLET ORAL at 07:58

## 2024-08-01 RX ADMIN — Medication 1 TABLET: at 07:59

## 2024-08-01 RX ADMIN — LOSARTAN POTASSIUM 25 MG: 25 TABLET, FILM COATED ORAL at 07:58

## 2024-08-01 RX ADMIN — METFORMIN HYDROCHLORIDE 1000 MG: 500 TABLET, FILM COATED ORAL at 07:57

## 2024-08-01 RX ADMIN — ROSUVASTATIN CALCIUM 20 MG: 20 TABLET, FILM COATED ORAL at 20:51

## 2024-08-01 RX ADMIN — SODIUM CHLORIDE, PRESERVATIVE FREE 10 ML: 5 INJECTION INTRAVENOUS at 07:57

## 2024-08-01 RX ADMIN — ZINC SULFATE 220 MG (50 MG) CAPSULE 50 MG: CAPSULE at 07:57

## 2024-08-01 RX ADMIN — CARVEDILOL 6.25 MG: 6.25 TABLET, FILM COATED ORAL at 07:57

## 2024-08-01 RX ADMIN — CARVEDILOL 6.25 MG: 6.25 TABLET, FILM COATED ORAL at 17:14

## 2024-08-01 RX ADMIN — ENOXAPARIN SODIUM 40 MG: 100 INJECTION SUBCUTANEOUS at 17:14

## 2024-08-01 RX ADMIN — METFORMIN HYDROCHLORIDE 1000 MG: 500 TABLET, FILM COATED ORAL at 17:14

## 2024-08-01 RX ADMIN — ASPIRIN 81 MG 81 MG: 81 TABLET ORAL at 07:57

## 2024-08-01 RX ADMIN — EMPAGLIFLOZIN 20 MG: 10 TABLET, FILM COATED ORAL at 07:58

## 2024-08-01 ASSESSMENT — PAIN SCALES - GENERAL
PAINLEVEL_OUTOF10: 0
PAINLEVEL_OUTOF10: 0

## 2024-08-01 NOTE — CARE COORDINATION
SCOTTW met with patient and spouse Provided written communication following Care Conference. LISW informed patient of recommendations for 2WW, SC, HHC PT, OT. Notified them that insurance does not cover SC. Patient reported that he has a 2WW at home he can use. Patient verbalized understanding and will choose an agency closer to discharge. Whiteboard updated.     Patient provided with list of [facility] participating C in the geographic area of the patient served. Patient selected [facility] and was provided with a comparative data handout from [facility]’s website. The patient (and/or Family) was educated on the quality outcomes for each provider. Patient (and/or Family) demonstrated understanding. Per patient/family request, referral made to [facility].      D/C Plan:  Estimated Date: Aug 9  DME: has recommended DME  HHC:  PT, OT (Agency [facility])  To:  Home with spouse (spouse will transport)

## 2024-08-01 NOTE — PROGRESS NOTES
Physical Rehabilitation: OCCUPATIONAL THERAPY     [x] daily progress note       [] discharge       Patient Name:  Franko Odom   :  1945 MRN: [id number]  Room:  [address] [place], [place] [address] Date of Admission: 2024  Rehabilitation Diagnosis:   Cerebral infarction, unspecified [I63.9]  Acute CVA (cerebrovascular accident) (HCC) [I63.9]       Date 2024       Day of ARU Week:  3   Time IN/-930  0882-8157   Individual Tx Minutes 60+60   Group Tx Minutes    Co-Treat Minutes    Concurrent Tx Minutes    TOTAL Tx Time Mins 120   Variance Time    Variance Reason []   Refusal due to:     []   Medical hold/reason:    []   Illness   []   Off Unit for test/procedure  []   Extra time needed to complete task  []   Therapeutic need  []   Make up mins were attempted but pt unable to complete due to (specify)  []   Other (specify):   Restrictions Restrictions/Precautions: General Precautions, Fall Risk         Communication with other providers: [x]   OK to see per nursing:     []   Spoke with team member regarding:      Subjective observations and cognitive status: Pt sitting in recliner chair upon arrival and agreeable to therapy in am and pm.     Pain level/location:    /10       Location:    Discharge recommendations  Anticipated discharge date:  TBD  Destination: []home alone   []home alone w assist prn   [] home w/ family    [] Continuous supervision       []SNF    [] Assisted living     [] Other:   Continued therapy: []HHC OT  []OUTPATIENT  OT   [] No Further OT  Equipment needs: TBD       ADLs:    Eating: Eating  Assistance Needed: Independent  Comment: x  CARE Score: 6  Discharge Goal: Independent       Oral Hygiene: Oral Hygiene  Assistance Needed: Supervision or touching assistance  Comment: while seated in w/c  CARE Score: 4  Discharge Goal: Independent    UB/LB Bathing: Shower/Bathe Self  Assistance Needed: Supervision or touching assistance  Comment: Sup- CGA in stance for dina hygiene in shower  CARE

## 2024-08-01 NOTE — PROGRESS NOTES
Physical Therapy  [x] daily progress note       [] discharge       Patient Name:  Franko Odom   :  1945 MRN: [id number]  Room:  [address] [place] [address] Date of Admission: 2024  Rehabilitation Diagnosis:   Cerebral infarction, unspecified [I63.9]  Acute CVA (cerebrovascular accident) (HCC) [I63.9]       Date 2024       Day of ARU Week:  3   Time IN/OUT 1435/1535   Individual Tx Minutes 60   Group Tx Minutes    Co-Treat Minutes    Concurrent Tx Minutes    TOTAL Tx Time Mins 60   Variance Time    Variance Time []   Refusal due to:     []   Medical hold/reason:    []   Illness   []   Off Unit for test/procedure  []   Extra time needed to complete task  []   Therapeutic need  []   Other (specify):   Restrictions Restrictions/Precautions  Restrictions/Precautions: General Precautions, Fall Risk      Interdisciplinary communication [x]   Cleared for therapy per nursing     []   RN notified about issues during session  []   RN updated on pt performance  []   Spoke with   []   Spoke with OT  []   Spoke with MD  []   Other:    Subjective observations and cognitive status: Pt ambulating with aide from bathroom to recliner upon entering. PT took over for aide.      Pain level/location:   0 /10       Location: occasional odd feeling in hip   Discharge recommendations  Anticipated discharge date:    Destination: []home alone   []home alone with assist PRN     [x] home w/ family      [] Continuous supervision  []SNF    [] Assisted living     [] Other:  Continued therapy: [x]HHC PT  []OUTPATIENT  PT   [] No Further PT  []SNF PT  Caregiver training recommended: [x]Yes  [] No   Equipment needs: Franko Odom requires the assistance of a wheeled walker to successfully ambulate from room to room at home to allow completion of daily living tasks such as: bathing, toileting, dressing and grooming.  A wheeled walker is necessary due to the patient's unsteady gait, upper body weakness, inability to  a

## 2024-08-02 LAB
GLUCOSE BLD-MCNC: 146 MG/DL (ref 70–99)
GLUCOSE BLD-MCNC: 173 MG/DL (ref 70–99)
GLUCOSE BLD-MCNC: 185 MG/DL (ref 70–99)
GLUCOSE BLD-MCNC: 186 MG/DL (ref 70–99)

## 2024-08-02 PROCEDURE — 99232 SBSQ HOSP IP/OBS MODERATE 35: CPT | Performed by: PHYSICAL MEDICINE & REHABILITATION

## 2024-08-02 PROCEDURE — 1280000000 HC REHAB R&B

## 2024-08-02 PROCEDURE — 94761 N-INVAS EAR/PLS OXIMETRY MLT: CPT

## 2024-08-02 PROCEDURE — 6360000002 HC RX W HCPCS: Performed by: PHYSICAL MEDICINE & REHABILITATION

## 2024-08-02 PROCEDURE — 97112 NEUROMUSCULAR REEDUCATION: CPT

## 2024-08-02 PROCEDURE — 97116 GAIT TRAINING THERAPY: CPT

## 2024-08-02 PROCEDURE — 6370000000 HC RX 637 (ALT 250 FOR IP): Performed by: STUDENT IN AN ORGANIZED HEALTH CARE EDUCATION/TRAINING PROGRAM

## 2024-08-02 PROCEDURE — 6370000000 HC RX 637 (ALT 250 FOR IP): Performed by: PHYSICAL MEDICINE & REHABILITATION

## 2024-08-02 PROCEDURE — 82962 GLUCOSE BLOOD TEST: CPT

## 2024-08-02 PROCEDURE — 97110 THERAPEUTIC EXERCISES: CPT

## 2024-08-02 PROCEDURE — 97530 THERAPEUTIC ACTIVITIES: CPT

## 2024-08-02 PROCEDURE — 97535 SELF CARE MNGMENT TRAINING: CPT

## 2024-08-02 PROCEDURE — 94150 VITAL CAPACITY TEST: CPT

## 2024-08-02 PROCEDURE — 6370000000 HC RX 637 (ALT 250 FOR IP)

## 2024-08-02 RX ADMIN — ALOGLIPTIN 25 MG: 12.5 TABLET, FILM COATED ORAL at 08:20

## 2024-08-02 RX ADMIN — CLOPIDOGREL BISULFATE 75 MG: 75 TABLET ORAL at 08:20

## 2024-08-02 RX ADMIN — ROSUVASTATIN CALCIUM 20 MG: 20 TABLET, FILM COATED ORAL at 21:23

## 2024-08-02 RX ADMIN — Medication 100 MG: at 08:21

## 2024-08-02 RX ADMIN — ASPIRIN 81 MG 81 MG: 81 TABLET ORAL at 08:20

## 2024-08-02 RX ADMIN — RANOLAZINE 500 MG: 500 TABLET, EXTENDED RELEASE ORAL at 21:23

## 2024-08-02 RX ADMIN — CARVEDILOL 6.25 MG: 6.25 TABLET, FILM COATED ORAL at 08:20

## 2024-08-02 RX ADMIN — Medication 1 TABLET: at 08:22

## 2024-08-02 RX ADMIN — ENOXAPARIN SODIUM 40 MG: 100 INJECTION SUBCUTANEOUS at 17:21

## 2024-08-02 RX ADMIN — METFORMIN HYDROCHLORIDE 1000 MG: 500 TABLET, FILM COATED ORAL at 08:20

## 2024-08-02 RX ADMIN — EMPAGLIFLOZIN 20 MG: 10 TABLET, FILM COATED ORAL at 08:20

## 2024-08-02 RX ADMIN — CARVEDILOL 6.25 MG: 6.25 TABLET, FILM COATED ORAL at 17:21

## 2024-08-02 RX ADMIN — ZINC SULFATE 220 MG (50 MG) CAPSULE 50 MG: CAPSULE at 08:21

## 2024-08-02 RX ADMIN — RANOLAZINE 500 MG: 500 TABLET, EXTENDED RELEASE ORAL at 08:20

## 2024-08-02 RX ADMIN — FOLIC ACID 1 MG: 1 TABLET ORAL at 08:21

## 2024-08-02 RX ADMIN — ACETAMINOPHEN 650 MG: 325 TABLET ORAL at 06:33

## 2024-08-02 RX ADMIN — METFORMIN HYDROCHLORIDE 1000 MG: 500 TABLET, FILM COATED ORAL at 17:21

## 2024-08-02 RX ADMIN — LOSARTAN POTASSIUM 25 MG: 25 TABLET, FILM COATED ORAL at 08:20

## 2024-08-02 ASSESSMENT — PAIN SCALES - GENERAL
PAINLEVEL_OUTOF10: 0

## 2024-08-02 NOTE — PROGRESS NOTES
Physical Therapy      [x] daily progress note       [] discharge       Patient Name:  Franko Odom   :  1945 MRN: [id number]  Room:  [address] [place] [address] Date of Admission: 2024  Rehabilitation Diagnosis:   Cerebral infarction, unspecified [I63.9]  Acute CVA (cerebrovascular accident) (HCC) [I63.9]       Date 2024       Day of ARU Week:  4   Time IN/OUT 2564-3969  0679-1923   Individual Tx Minutes 65+55   TOTAL Tx Time Mins 120   Variance Time    Variance Time []   Refusal due to:     []   Medical hold/reason:    []   Illness   []   Off Unit for test/procedure  []   Extra time needed to complete task  []   Therapeutic need  []   Other (specify):   Restrictions Restrictions/Precautions  Restrictions/Precautions: General Precautions, Fall Risk      Communication with other providers: [x]   OK to see per nursing:     []   Spoke with team member regarding:      Subjective observations and cognitive status: Pt up in recliner, pleasant and receptive to tx. VS at rest: /65, HR 81     Pain level/location: 0/10       Location:    Discharge recommendations  Anticipated discharge date:    Destination: []home alone   []home alone with assist PRN     [x] home w/ family      [] Continuous supervision  []SNF    [] Assisted living     [] Other:  Continued therapy: [x]HHC PT  []OUTPATIENT  PT   [] No Further PT  []SNF PT  Caregiver training recommended: [x]Yes  [] No   Equipment needs: Franko Odom requires the assistance of a wheeled walker to successfully ambulate from room to room at home to allow completion of daily living tasks such as: bathing, toileting, dressing and grooming.  A wheeled walker is necessary due to the patient's unsteady gait, upper body weakness, inability to  a standard walker.  This patient can ambulate only by pushing a walker instead of using a lesser assistive device such as a cane or crutch.      Bed Mobility:           [x]   Pt received out of bed     Transfers:   is level entry), then has full flight up to main level of house.  Entrance Stairs - Rails: Right  Bathroom Shower/Tub: Walk-in shower  Bathroom Toilet: Handicap height (uses nearby vanity to assist with transfer)  Bathroom Equipment: Grab bars in shower  Bathroom Accessibility: Walker accessible  Home Equipment: Walker - Rolling (RW is wife's (she's not using))  Has the patient had two or more falls in the past year or any fall with injury in the past year?: No  ADL Assistance: Independent  Homemaking Responsibilities: Yes  Meal Prep Responsibility: Secondary  Laundry Responsibility: Secondary (wife typically does laundry but he carries basket upstairs)  Cleaning Responsibility: Secondary  Bill Paying/Finance Responsibility: Primary  Shopping Responsibility: Secondary (typically goes with wife)  Dependent Care Responsibility: No  Health Care Management: Primary (uses a pillbox)  Ambulation Assistance: Independent  Transfer Assistance: Independent  Active : Yes  Mode of Transportation: Truck  Education: 2yrs college  Occupation: Retired  Type of Occupation: [employer] 32 years  Leisure & Hobbies: [facility], works on lawnmowers, yardwork, pt sets up meds in 7 day med box, spouse takes care of bills, computer internet searches and games. Out to eat, [employer], [employer], Grocery, no pets.  Additional Comments: Pt typically sleeps in a flat, regular bed at home    Objective                                                                                    Goals:  (Update in navigator)  Short Term Goals  Time Frame for Short Term Goals: 14 days STG=LTG  Short Term Goal 1: Pt will perform bed mobiltiy with mod I  Short Term Goal 2: Pt will perform all functional transfers with mod I  Short Term Goal 3: Pt will perform gait with 2ww 50' on level surfaces and 10' on unlevel surfaces with mod I, 150' with supervision on level surfaces  Short Term Goal 4: Pt will negotiate curb step with 2ww and 12 steps

## 2024-08-02 NOTE — PROGRESS NOTES
Franko Odom    : 1945  Acct #: [id number]  MRN: [id number]              PM&R Progress Note      Admitting diagnosis: Acute right middle cerebellar peduncle stroke     COMORBID DIAGNOSES IMPACTING REHAB:  CAD, chronic back pain, diabetes, hyperlipidemia, hypertension     Chief complaint: Off-balance when weightbearing.    Prior (baseline) level of function: Independent.    Current level of function:         Current  IRF-LY and Goals:   Occupational Therapy:    Short Term Goals  Time Frame for Short Term Goals: STGs=LTGs :   Long Term Goals  Time Frame for Long Term Goals : 10 days or until d/c  Long Term Goal 1: Pt will complete grooming tasks Ind  Long Term Goal 2: Pt will complete total body bathing mod I  Long Term Goal 3: Pt will complete UB dressing Ind  Long Term Goal 4: Pt will complete LB dressing Ind  Long Term Goal 5: Pt will doff/don footwear Ind  Additional Goals?: Yes  Long Term Goal 6: Pt will complete toileting mod I  Long Term Goal 7: Pt will complete functional transfers (bed, chair, toilet, shower) c DME PRN and mod I  Long Term Goal 8: Pt will perform therex/therax to facilitate increased strength/endurance/ax tolerance (c emphasis on dynamic standing balance/tolerance >10 mins, RUE coordination) c SBA  Long Term Goal 9: Pt will complete simple homemaking tasks c DME PRN and mod I :                                       Eating: Eating  Assistance Needed: Independent  Comment: x  CARE Score: 6  Discharge Goal: Independent       Oral Hygiene: Oral Hygiene  Assistance Needed: Supervision or touching assistance  Comment: while seated in w/c  CARE Score: 4  Discharge Goal: Independent    UB/LB Bathing: Shower/Bathe Self  Assistance Needed: Supervision or touching assistance  Comment: Sup- CGA in stance for dina hygiene in shower  CARE Score: 4  Discharge Goal: Independent    UB Dressing: Upper Body Dressing  Assistance Needed: Supervision or touching assistance  Comment: CGA while in

## 2024-08-02 NOTE — CARE COORDINATION
MAHENDRA spoke with patient and spouse to discuss discharge plan and obtain HHC choice. Patient chose [facility] and referral will be sent closer to discharge.

## 2024-08-02 NOTE — PROGRESS NOTES
Physical Rehabilitation: OCCUPATIONAL THERAPY     [x] daily progress note       [] discharge       Patient Name:  Franko Odom   :  1945 MRN: [id number]  Room:  [address] [place], [place] [address] Date of Admission: 2024  Rehabilitation Diagnosis:   Cerebral infarction, unspecified [I63.9]  Acute CVA (cerebrovascular accident) (HCC) [I63.9]       Date 2024       Day of ARU Week:  4   Time IN/-1035   Individual Tx Minutes 60   Group Tx Minutes    Co-Treat Minutes    Concurrent Tx Minutes    TOTAL Tx Time Mins 60   Variance Time    Variance Reason []   Refusal due to:     []   Medical hold/reason:    []   Illness   []   Off Unit for test/procedure  []   Extra time needed to complete task  []   Therapeutic need  []   Make up mins were attempted but pt unable to complete due to (specify)  []   Other (specify):   Restrictions Restrictions/Precautions: General Precautions, Fall Risk         Communication with other providers: [x]   OK to see per nursing:     []   Spoke with team member regarding:      Subjective observations and cognitive status: Pt sitting in recliner chair upon arrival and agreeable to ADLs this am.     Pain level/location:    /10       Location:    Discharge recommendations  Anticipated discharge date:  TBD  Destination: []home alone   []home alone w assist prn   [] home w/ family    [] Continuous supervision       []SNF    [] Assisted living     [] Other:   Continued therapy: []HHC OT  []OUTPATIENT  OT   [] No Further OT  Equipment needs: TBD       ADLs:    UB/LB Bathing: Shower/Bathe Self  Assistance Needed: Supervision or touching assistance  Comment: cga in stance at sink  CARE Score: 4  Discharge Goal: Independent    UB Dressing: Upper Body Dressing  Assistance Needed: Supervision or touching assistance  Comment: cga  Reason if not Attempted: Not attempted due to environmental limitations  CARE Score: 4  Discharge Goal: Independent         LB Dressing: Lower Body Dressing  Assistance

## 2024-08-03 LAB
GLUCOSE BLD-MCNC: 162 MG/DL (ref 70–99)
GLUCOSE BLD-MCNC: 162 MG/DL (ref 70–99)
GLUCOSE BLD-MCNC: 177 MG/DL (ref 70–99)
GLUCOSE BLD-MCNC: 191 MG/DL (ref 70–99)

## 2024-08-03 PROCEDURE — 94664 DEMO&/EVAL PT USE INHALER: CPT

## 2024-08-03 PROCEDURE — 97530 THERAPEUTIC ACTIVITIES: CPT

## 2024-08-03 PROCEDURE — 6370000000 HC RX 637 (ALT 250 FOR IP): Performed by: STUDENT IN AN ORGANIZED HEALTH CARE EDUCATION/TRAINING PROGRAM

## 2024-08-03 PROCEDURE — 82962 GLUCOSE BLOOD TEST: CPT

## 2024-08-03 PROCEDURE — 1280000000 HC REHAB R&B

## 2024-08-03 PROCEDURE — 6360000002 HC RX W HCPCS: Performed by: PHYSICAL MEDICINE & REHABILITATION

## 2024-08-03 PROCEDURE — 6370000000 HC RX 637 (ALT 250 FOR IP)

## 2024-08-03 PROCEDURE — 94150 VITAL CAPACITY TEST: CPT

## 2024-08-03 PROCEDURE — 97110 THERAPEUTIC EXERCISES: CPT

## 2024-08-03 PROCEDURE — 94761 N-INVAS EAR/PLS OXIMETRY MLT: CPT

## 2024-08-03 PROCEDURE — 97116 GAIT TRAINING THERAPY: CPT

## 2024-08-03 RX ADMIN — METFORMIN HYDROCHLORIDE 1000 MG: 500 TABLET, FILM COATED ORAL at 08:00

## 2024-08-03 RX ADMIN — RANOLAZINE 500 MG: 500 TABLET, EXTENDED RELEASE ORAL at 20:32

## 2024-08-03 RX ADMIN — RANOLAZINE 500 MG: 500 TABLET, EXTENDED RELEASE ORAL at 08:00

## 2024-08-03 RX ADMIN — EMPAGLIFLOZIN 20 MG: 10 TABLET, FILM COATED ORAL at 07:59

## 2024-08-03 RX ADMIN — Medication 100 MG: at 08:00

## 2024-08-03 RX ADMIN — CARVEDILOL 6.25 MG: 6.25 TABLET, FILM COATED ORAL at 17:52

## 2024-08-03 RX ADMIN — CARVEDILOL 6.25 MG: 6.25 TABLET, FILM COATED ORAL at 08:00

## 2024-08-03 RX ADMIN — FOLIC ACID 1 MG: 1 TABLET ORAL at 08:00

## 2024-08-03 RX ADMIN — CLOPIDOGREL BISULFATE 75 MG: 75 TABLET ORAL at 08:00

## 2024-08-03 RX ADMIN — ALOGLIPTIN 25 MG: 12.5 TABLET, FILM COATED ORAL at 07:59

## 2024-08-03 RX ADMIN — LOSARTAN POTASSIUM 25 MG: 25 TABLET, FILM COATED ORAL at 08:00

## 2024-08-03 RX ADMIN — Medication 1 TABLET: at 08:01

## 2024-08-03 RX ADMIN — METFORMIN HYDROCHLORIDE 1000 MG: 500 TABLET, FILM COATED ORAL at 17:52

## 2024-08-03 RX ADMIN — ROSUVASTATIN CALCIUM 20 MG: 20 TABLET, FILM COATED ORAL at 20:32

## 2024-08-03 RX ADMIN — ENOXAPARIN SODIUM 40 MG: 100 INJECTION SUBCUTANEOUS at 17:52

## 2024-08-03 RX ADMIN — ZINC SULFATE 220 MG (50 MG) CAPSULE 50 MG: CAPSULE at 08:00

## 2024-08-03 RX ADMIN — ASPIRIN 81 MG 81 MG: 81 TABLET ORAL at 08:00

## 2024-08-03 NOTE — PROGRESS NOTES
Franko Odom    : 1945  Acct #: [id number]  MRN: [id number]              PM&R Progress Note      Admitting diagnosis: ***    Comorbid diagnoses impacting rehabilitation: ***    Chief complaint: ***    Prior (baseline) level of function: Independent.    Current level of function:         Current  IRF-LY and Goals:   Occupational Therapy:    Short Term Goals  Time Frame for Short Term Goals: STGs=LTGs :   Long Term Goals  Time Frame for Long Term Goals : 10 days or until d/c  Long Term Goal 1: Pt will complete grooming tasks Ind  Long Term Goal 2: Pt will complete total body bathing mod I  Long Term Goal 3: Pt will complete UB dressing Ind  Long Term Goal 4: Pt will complete LB dressing Ind  Long Term Goal 5: Pt will doff/don footwear Ind  Additional Goals?: Yes  Long Term Goal 6: Pt will complete toileting mod I  Long Term Goal 7: Pt will complete functional transfers (bed, chair, toilet, shower) c DME PRN and mod I  Long Term Goal 8: Pt will perform therex/therax to facilitate increased strength/endurance/ax tolerance (c emphasis on dynamic standing balance/tolerance >10 mins, RUE coordination) c SBA  Long Term Goal 9: Pt will complete simple homemaking tasks c DME PRN and mod I :                                       Eating: Eating  Assistance Needed: Independent  Comment: x  CARE Score: 6  Discharge Goal: Independent       Oral Hygiene: Oral Hygiene  Assistance Needed: Supervision or touching assistance  Comment: while seated in w/c  CARE Score: 4  Discharge Goal: Independent    UB/LB Bathing: Shower/Bathe Self  Assistance Needed: Supervision or touching assistance  Comment: cga in stance at sink  CARE Score: 4  Discharge Goal: Independent    UB Dressing: Upper Body Dressing  Assistance Needed: Supervision or touching assistance  Comment: cga  Reason if not Attempted: Not attempted due to environmental limitations  CARE Score: 4  Discharge Goal: Independent         LB Dressing: Lower Body

## 2024-08-03 NOTE — PLAN OF CARE
Problem: Safety - Adult  Goal: Free from fall injury  8/3/2024 0750 by Fran Toledo, RN  Outcome: Progressing  8/2/2024 1947 by Kevin Barrett RN  Outcome: Progressing

## 2024-08-03 NOTE — PROGRESS NOTES
[x] daily progress note       [] discharge       Patient Name:  Franko Odom   :  1945 MRN: [id number]  Room:  [address] [place] [address] Date of Admission: 2024  Rehabilitation Diagnosis:   Cerebral infarction, unspecified [I63.9]  Acute CVA (cerebrovascular accident) (HCC) [I63.9]       Date 8/3/2024       Day of ARU Week:  5   Time IN/OUT 1245/1345   Individual Tx Minutes 60   TOTAL Tx Time Mins 60   Variance Time    Variance Reason []   Refusal due to:     []   Medical hold/reason:    []   Illness   []   Off Unit for test/procedure  []   Extra time needed to complete task  []   Therapeutic need  []   Make up mins were attempted but pt unable to complete due to (specify)  []   Other (specify):   Restrictions Restrictions/Precautions  Restrictions/Precautions: General Precautions, Fall Risk      Communication with other providers: [x]   OK to see per nursing:     []   Spoke with team member regarding:      Subjective observations and cognitive status: Pt seated in chair upon arrival with wife present. Agreeable to therapy.    Pain level/location: 0/10        Discharge recommendations  Anticipated discharge date:    Destination: []home alone   []home alone with assist PRN     [x] home w/ family      [] Continuous supervision  []SNF    [] Assisted living     [] Other:   Continued therapy: [x]C PT  []OUTPATIENT  PT   [] No Further PT  Equipment needs: Franko Odom requires the assistance of a wheeled walker to successfully ambulate from room to room at home to allow completion of daily living tasks such as: bathing, toileting, dressing and grooming. A wheeled walker is necessary due to the patient's unsteady gait, upper body weakness, inability to  a standard walker. This patient can ambulate only by pushing a walker instead of using a lesser assistive device such as a cane or crutch.          Bed Mobility:           [x]   Pt received out of bed     Transfers:    Sit--> Stand:  SBA  Stand --> Sit:

## 2024-08-03 NOTE — PROGRESS NOTES
Physical Rehabilitation: OCCUPATIONAL THERAPY     [x] daily progress note       [] discharge       Patient Name:  Franok Odom   :  1945 MRN: [id number]  Room:  [address] [place], [place] [address] Date of Admission: 2024  Rehabilitation Diagnosis:   Cerebral infarction, unspecified [I63.9]  Acute CVA (cerebrovascular accident) (HCC) [I63.9]       Date 8/3/2024       Day of ARU Week:  5   Time IN//1030  0574-2502   Individual Tx Minutes 120   Group Tx Minutes    Co-Treat Minutes    Concurrent Tx Minutes    TOTAL Tx Time Mins 120   Variance Time    Variance Reason []   Refusal due to:     []   Medical hold/reason:    []   Illness   []   Off Unit for test/procedure  []   Extra time needed to complete task  []   Therapeutic need  []   Make up mins were attempted but pt unable to complete due to (specify)  []   Other (specify):   Restrictions Restrictions/Precautions: General Precautions, Fall Risk         Communication with other providers: []   OK to see per nursing:     []   Spoke with team member regarding:      Subjective observations and cognitive status:      Pain level/location:   0 /10       Location:    Discharge recommendations  Anticipated discharge date:    Destination: []home alone   []home alone w assist prn   [x] home w/ family    [] Continuous supervision       []SNF    [] Assisted living     [] Other:   Continued therapy: [][facility] OT  []OUTPATIENT  OT   [] No Further OT  Equipment needs: tbd       ADLs:    Eating: Eating  Assistance Needed: Independent  Comment: x  CARE Score: 6  Discharge Goal: Independent       Oral Hygiene: Oral Hygiene  Assistance Needed: Set-Up/Clean-Up Assistance  Comment: while seated in w/c  CARE Score: 5  Discharge Goal: Independent    UB/LB Bathing: Shower/Bathe Self  Assistance Needed: Supervision or touching assistance  Comment: sup seated in shower- sba/cga in stance with use of grab bars to wash bottom   CARE Score: 4  Discharge Goal: Independent    UB Dressing: Upper

## 2024-08-04 VITALS
WEIGHT: 205.69 LBS | HEIGHT: 71 IN | BODY MASS INDEX: 28.8 KG/M2 | TEMPERATURE: 98 F | DIASTOLIC BLOOD PRESSURE: 74 MMHG | HEART RATE: 77 BPM | OXYGEN SATURATION: 96 % | SYSTOLIC BLOOD PRESSURE: 113 MMHG | RESPIRATION RATE: 18 BRPM

## 2024-08-04 LAB
GLUCOSE BLD-MCNC: 133 MG/DL (ref 70–99)
GLUCOSE BLD-MCNC: 138 MG/DL (ref 70–99)
GLUCOSE BLD-MCNC: 149 MG/DL (ref 70–99)
GLUCOSE BLD-MCNC: 152 MG/DL (ref 70–99)

## 2024-08-04 PROCEDURE — 94150 VITAL CAPACITY TEST: CPT

## 2024-08-04 PROCEDURE — 6370000000 HC RX 637 (ALT 250 FOR IP): Performed by: STUDENT IN AN ORGANIZED HEALTH CARE EDUCATION/TRAINING PROGRAM

## 2024-08-04 PROCEDURE — 94761 N-INVAS EAR/PLS OXIMETRY MLT: CPT

## 2024-08-04 PROCEDURE — 1280000000 HC REHAB R&B

## 2024-08-04 PROCEDURE — 82962 GLUCOSE BLOOD TEST: CPT

## 2024-08-04 PROCEDURE — 6370000000 HC RX 637 (ALT 250 FOR IP)

## 2024-08-04 PROCEDURE — 6360000002 HC RX W HCPCS: Performed by: PHYSICAL MEDICINE & REHABILITATION

## 2024-08-04 RX ADMIN — RANOLAZINE 500 MG: 500 TABLET, EXTENDED RELEASE ORAL at 20:08

## 2024-08-04 RX ADMIN — Medication 1 TABLET: at 08:30

## 2024-08-04 RX ADMIN — EMPAGLIFLOZIN 20 MG: 10 TABLET, FILM COATED ORAL at 08:29

## 2024-08-04 RX ADMIN — METFORMIN HYDROCHLORIDE 1000 MG: 500 TABLET, FILM COATED ORAL at 08:29

## 2024-08-04 RX ADMIN — METFORMIN HYDROCHLORIDE 1000 MG: 500 TABLET, FILM COATED ORAL at 17:28

## 2024-08-04 RX ADMIN — ROSUVASTATIN CALCIUM 20 MG: 20 TABLET, FILM COATED ORAL at 20:08

## 2024-08-04 RX ADMIN — CARVEDILOL 6.25 MG: 6.25 TABLET, FILM COATED ORAL at 17:28

## 2024-08-04 RX ADMIN — CARVEDILOL 6.25 MG: 6.25 TABLET, FILM COATED ORAL at 08:29

## 2024-08-04 RX ADMIN — ZINC SULFATE 220 MG (50 MG) CAPSULE 50 MG: CAPSULE at 08:29

## 2024-08-04 RX ADMIN — FOLIC ACID 1 MG: 1 TABLET ORAL at 08:29

## 2024-08-04 RX ADMIN — LOSARTAN POTASSIUM 25 MG: 25 TABLET, FILM COATED ORAL at 08:29

## 2024-08-04 RX ADMIN — RANOLAZINE 500 MG: 500 TABLET, EXTENDED RELEASE ORAL at 08:29

## 2024-08-04 RX ADMIN — Medication 100 MG: at 08:30

## 2024-08-04 RX ADMIN — ENOXAPARIN SODIUM 40 MG: 100 INJECTION SUBCUTANEOUS at 17:28

## 2024-08-04 RX ADMIN — CLOPIDOGREL BISULFATE 75 MG: 75 TABLET ORAL at 08:29

## 2024-08-04 RX ADMIN — ALOGLIPTIN 25 MG: 12.5 TABLET, FILM COATED ORAL at 08:29

## 2024-08-04 RX ADMIN — ASPIRIN 81 MG 81 MG: 81 TABLET ORAL at 08:30

## 2024-08-04 NOTE — PLAN OF CARE
Problem: Safety - Adult  Goal: Free from fall injury  8/4/2024 0839 by Fran Toledo RN  Outcome: Progressing  8/3/2024 2032 by Namita Najera  Outcome: Progressing     Problem: Pain  Goal: Verbalizes/displays adequate comfort level or baseline comfort level  8/3/2024 2032 by Namita Najera  Outcome: Progressing

## 2024-08-05 LAB
GLUCOSE BLD-MCNC: 137 MG/DL (ref 70–99)
GLUCOSE BLD-MCNC: 175 MG/DL (ref 70–99)

## 2024-08-05 PROCEDURE — 6370000000 HC RX 637 (ALT 250 FOR IP): Performed by: STUDENT IN AN ORGANIZED HEALTH CARE EDUCATION/TRAINING PROGRAM

## 2024-08-05 PROCEDURE — 94761 N-INVAS EAR/PLS OXIMETRY MLT: CPT

## 2024-08-05 PROCEDURE — 82962 GLUCOSE BLOOD TEST: CPT

## 2024-08-05 PROCEDURE — 6370000000 HC RX 637 (ALT 250 FOR IP)

## 2024-08-05 PROCEDURE — 97530 THERAPEUTIC ACTIVITIES: CPT

## 2024-08-05 PROCEDURE — 97116 GAIT TRAINING THERAPY: CPT

## 2024-08-05 PROCEDURE — 97110 THERAPEUTIC EXERCISES: CPT

## 2024-08-05 PROCEDURE — 6360000002 HC RX W HCPCS: Performed by: PHYSICAL MEDICINE & REHABILITATION

## 2024-08-05 PROCEDURE — 97112 NEUROMUSCULAR REEDUCATION: CPT

## 2024-08-05 PROCEDURE — 1280000000 HC REHAB R&B

## 2024-08-05 PROCEDURE — 99232 SBSQ HOSP IP/OBS MODERATE 35: CPT | Performed by: PHYSICAL MEDICINE & REHABILITATION

## 2024-08-05 RX ADMIN — ROSUVASTATIN CALCIUM 20 MG: 20 TABLET, FILM COATED ORAL at 21:51

## 2024-08-05 RX ADMIN — FOLIC ACID 1 MG: 1 TABLET ORAL at 10:24

## 2024-08-05 RX ADMIN — EMPAGLIFLOZIN 20 MG: 10 TABLET, FILM COATED ORAL at 10:24

## 2024-08-05 RX ADMIN — ZINC SULFATE 220 MG (50 MG) CAPSULE 50 MG: CAPSULE at 10:29

## 2024-08-05 RX ADMIN — CARVEDILOL 6.25 MG: 6.25 TABLET, FILM COATED ORAL at 18:45

## 2024-08-05 RX ADMIN — ALOGLIPTIN 25 MG: 12.5 TABLET, FILM COATED ORAL at 10:26

## 2024-08-05 RX ADMIN — RANOLAZINE 500 MG: 500 TABLET, EXTENDED RELEASE ORAL at 10:26

## 2024-08-05 RX ADMIN — Medication 100 MG: at 10:26

## 2024-08-05 RX ADMIN — Medication 1 TABLET: at 10:24

## 2024-08-05 RX ADMIN — METFORMIN HYDROCHLORIDE 1000 MG: 500 TABLET, FILM COATED ORAL at 10:24

## 2024-08-05 RX ADMIN — ENOXAPARIN SODIUM 40 MG: 100 INJECTION SUBCUTANEOUS at 18:45

## 2024-08-05 RX ADMIN — RANOLAZINE 500 MG: 500 TABLET, EXTENDED RELEASE ORAL at 21:51

## 2024-08-05 RX ADMIN — ASPIRIN 81 MG CHEWABLE TABLET 81 MG: 81 TABLET CHEWABLE at 10:24

## 2024-08-05 RX ADMIN — METFORMIN HYDROCHLORIDE 1000 MG: 500 TABLET, FILM COATED ORAL at 18:45

## 2024-08-05 RX ADMIN — CARVEDILOL 6.25 MG: 6.25 TABLET, FILM COATED ORAL at 10:23

## 2024-08-05 RX ADMIN — LOSARTAN POTASSIUM 25 MG: 25 TABLET, FILM COATED ORAL at 10:26

## 2024-08-05 RX ADMIN — CLOPIDOGREL BISULFATE 75 MG: 75 TABLET ORAL at 10:24

## 2024-08-05 ASSESSMENT — PAIN SCALES - GENERAL
PAINLEVEL_OUTOF10: 0

## 2024-08-05 NOTE — PLAN OF CARE
Problem: Discharge Planning  Goal: Discharge to home or other facility with appropriate resources  Outcome: Progressing  Flowsheets (Taken 8/5/2024 1520)  Discharge to home or other facility with appropriate resources:   Identify barriers to discharge with patient and caregiver   Arrange for needed discharge resources and transportation as appropriate   Identify discharge learning needs (meds, wound care, etc)   Arrange for interpreters to assist at discharge as needed   Refer to discharge planning if patient needs post-hospital services based on physician order or complex needs related to functional status, cognitive ability or social support system     Problem: Safety - Adult  Goal: Free from fall injury  Outcome: Progressing     Problem: ABCDS Injury Assessment  Goal: Absence of physical injury  Outcome: Progressing     Problem: Pain  Goal: Verbalizes/displays adequate comfort level or baseline comfort level  Outcome: Progressing     Problem: Skin/Tissue Integrity  Goal: Absence of new skin breakdown  Description: 1.  Monitor for areas of redness and/or skin breakdown  2.  Assess vascular access sites hourly  3.  Every 4-6 hours minimum:  Change oxygen saturation probe site  4.  Every 4-6 hours:  If on nasal continuous positive airway pressure, respiratory therapy assess nares and determine need for appliance change or resting period.  Outcome: Progressing

## 2024-08-05 NOTE — CARE COORDINATION
Concurrent review was sent to [facility] via routed fax 9:37 AM.     Referral sent to [facility].

## 2024-08-05 NOTE — CARE COORDINATION
[facility] Liaison spoke with pt and pts spouse & both are agreeable to [facility] at discharge. Demo info reviewed. Dc 8/9.

## 2024-08-05 NOTE — PROGRESS NOTES
Physical Therapy      [x] daily progress note       [] discharge       Patient Name:  Franko Odom   :  1945 MRN: [id number]  Room:  [address] [place] [address] Date of Admission: 2024  Rehabilitation Diagnosis:   Cerebral infarction, unspecified [I63.9]  Acute CVA (cerebrovascular accident) (HCC) [I63.9]       Date 2024       Day of ARU Week:  7   Time IN/OUT 5397-0997  8931-6065   Individual Tx Minutes 60+60   TOTAL Tx Time Mins 120   Variance Time    Variance Time []   Refusal due to:     []   Medical hold/reason:    []   Illness   []   Off Unit for test/procedure  []   Extra time needed to complete task  []   Therapeutic need  []   Other (specify):   Restrictions Restrictions/Precautions  Restrictions/Precautions: General Precautions, Fall Risk      Communication with other providers: [x]   OK to see per nursing:     []   Spoke with team member regarding:      Subjective observations and cognitive status: Pt up in recliner, pleasant and agreeable to session  PM: Pt on commode with STNA present; receptive to tx plan   Pain level/location: 0/10       Location:    Discharge recommendations  Anticipated discharge date:    Destination: []home alone   []home alone with assist PRN     [x] home w/ family      [] Continuous supervision  []SNF    [] Assisted living     [] Other:   Continued therapy: [x]C PT  []OUTPATIENT  PT   [] No Further PT  Equipment needs: Franko Odom requires the assistance of a wheeled walker to successfully ambulate from room to room at home to allow completion of daily living tasks such as: bathing, toileting, dressing and grooming. A wheeled walker is necessary due to the patient's unsteady gait, upper body weakness, inability to  a standard walker. This patient can ambulate only by pushing a walker instead of using a lesser assistive device such as a cane or crutch.      Bed Mobility:           [x]   Pt received out of bed     Transfers:    Sit--> Stand:  CGA  Stand -->

## 2024-08-05 NOTE — PROGRESS NOTES
Franko Odom    : 1945  Acct #: [id number]  MRN: [id number]              PM&R Progress Note      Admitting diagnosis: Acute right middle cerebellar peduncle stroke     COMORBID DIAGNOSES IMPACTING REHAB:  CAD, chronic back pain, diabetes, hyperlipidemia, hypertension     Chief complaint: Fair sleep.  No nausea.  Occasional bowel movement.    Prior (baseline) level of function: Independent.    Current level of function:         Current  IRF-LY and Goals:   Occupational Therapy:    Short Term Goals  Time Frame for Short Term Goals: STGs=LTGs :   Long Term Goals  Time Frame for Long Term Goals : 10 days or until d/c  Long Term Goal 1: Pt will complete grooming tasks Ind  Long Term Goal 2: Pt will complete total body bathing mod I  Long Term Goal 3: Pt will complete UB dressing Ind  Long Term Goal 4: Pt will complete LB dressing Ind  Long Term Goal 5: Pt will doff/don footwear Ind  Additional Goals?: Yes  Long Term Goal 6: Pt will complete toileting mod I  Long Term Goal 7: Pt will complete functional transfers (bed, chair, toilet, shower) c DME PRN and mod I  Long Term Goal 8: Pt will perform therex/therax to facilitate increased strength/endurance/ax tolerance (c emphasis on dynamic standing balance/tolerance >10 mins, RUE coordination) c SBA  Long Term Goal 9: Pt will complete simple homemaking tasks c DME PRN and mod I :                                       Eating: Eating  Assistance Needed: Independent  Comment: x  CARE Score: 6  Discharge Goal: Independent       Oral Hygiene: Oral Hygiene  Assistance Needed: Supervision or touching assistance  Comment: while seated in w/c  CARE Score: 4  Discharge Goal: Independent    UB/LB Bathing: Shower/Bathe Self  Assistance Needed: Supervision or touching assistance  Comment: cga in stance at sink  CARE Score: 4  Discharge Goal: Independent    UB Dressing: Upper Body Dressing  Assistance Needed: Supervision or touching assistance  Comment: cga  Reason if not

## 2024-08-06 LAB
GLUCOSE BLD-MCNC: 153 MG/DL (ref 70–99)
GLUCOSE BLD-MCNC: 185 MG/DL (ref 70–99)

## 2024-08-06 PROCEDURE — 97112 NEUROMUSCULAR REEDUCATION: CPT

## 2024-08-06 PROCEDURE — 6360000002 HC RX W HCPCS: Performed by: PHYSICAL MEDICINE & REHABILITATION

## 2024-08-06 PROCEDURE — 82962 GLUCOSE BLOOD TEST: CPT

## 2024-08-06 PROCEDURE — 97535 SELF CARE MNGMENT TRAINING: CPT

## 2024-08-06 PROCEDURE — 97530 THERAPEUTIC ACTIVITIES: CPT

## 2024-08-06 PROCEDURE — 97110 THERAPEUTIC EXERCISES: CPT

## 2024-08-06 PROCEDURE — 97116 GAIT TRAINING THERAPY: CPT

## 2024-08-06 PROCEDURE — 1280000000 HC REHAB R&B

## 2024-08-06 PROCEDURE — 6370000000 HC RX 637 (ALT 250 FOR IP): Performed by: STUDENT IN AN ORGANIZED HEALTH CARE EDUCATION/TRAINING PROGRAM

## 2024-08-06 PROCEDURE — 6370000000 HC RX 637 (ALT 250 FOR IP)

## 2024-08-06 PROCEDURE — 94761 N-INVAS EAR/PLS OXIMETRY MLT: CPT

## 2024-08-06 RX ADMIN — METFORMIN HYDROCHLORIDE 1000 MG: 500 TABLET, FILM COATED ORAL at 17:16

## 2024-08-06 RX ADMIN — METFORMIN HYDROCHLORIDE 1000 MG: 500 TABLET, FILM COATED ORAL at 08:21

## 2024-08-06 RX ADMIN — CARVEDILOL 6.25 MG: 6.25 TABLET, FILM COATED ORAL at 08:22

## 2024-08-06 RX ADMIN — ROSUVASTATIN CALCIUM 20 MG: 20 TABLET, FILM COATED ORAL at 20:30

## 2024-08-06 RX ADMIN — CLOPIDOGREL BISULFATE 75 MG: 75 TABLET ORAL at 08:21

## 2024-08-06 RX ADMIN — ASPIRIN 81 MG CHEWABLE TABLET 81 MG: 81 TABLET CHEWABLE at 08:22

## 2024-08-06 RX ADMIN — ENOXAPARIN SODIUM 40 MG: 100 INJECTION SUBCUTANEOUS at 17:16

## 2024-08-06 RX ADMIN — ALOGLIPTIN 25 MG: 12.5 TABLET, FILM COATED ORAL at 08:21

## 2024-08-06 RX ADMIN — EMPAGLIFLOZIN 20 MG: 10 TABLET, FILM COATED ORAL at 08:22

## 2024-08-06 RX ADMIN — LOSARTAN POTASSIUM 25 MG: 25 TABLET, FILM COATED ORAL at 08:23

## 2024-08-06 RX ADMIN — RANOLAZINE 500 MG: 500 TABLET, EXTENDED RELEASE ORAL at 20:30

## 2024-08-06 RX ADMIN — Medication 1 TABLET: at 08:23

## 2024-08-06 RX ADMIN — ZINC SULFATE 220 MG (50 MG) CAPSULE 50 MG: CAPSULE at 08:24

## 2024-08-06 RX ADMIN — CARVEDILOL 6.25 MG: 6.25 TABLET, FILM COATED ORAL at 17:16

## 2024-08-06 RX ADMIN — Medication 100 MG: at 08:22

## 2024-08-06 RX ADMIN — RANOLAZINE 500 MG: 500 TABLET, EXTENDED RELEASE ORAL at 08:22

## 2024-08-06 RX ADMIN — FOLIC ACID 1 MG: 1 TABLET ORAL at 08:23

## 2024-08-06 ASSESSMENT — PAIN SCALES - GENERAL: PAINLEVEL_OUTOF10: 0

## 2024-08-06 NOTE — PROGRESS NOTES
Device:  RW  Height:   6\"    Uneven Surfaces:       Assistance:    CG-Min A due to R lateral lean with grades toward R. Ed provided for slower pace and steps, making turns in increments in order to compensate for R lean; pt able to navigate AD over obstacles after initial verbal and visual demonstration  Device:    RW  Surfaces Completed:   [] Carpet with bean bags beneath       [] Throw rugs          [x] Outdoor pavements      [] Grass       [] Loose gravel   [x] Carpet      [x]  Multi grades     [x] Threshold         [x] Ramp           Additional Therapeutic activities/exercises completed this date:     []   Nu-step:  Time:        Level:         #Steps:       []   Rebounder:    []  Seated     []  Standing        []   Balance training         []   Postural training    []   Supine ther ex (reps/sets):     []   Seated ther ex (reps/sets):     []   Standing ther ex (reps/sets):     [x]   Picked up different sized and weighted objects from floor, amb to each object with RW with CGA for balance, able to demo safety with AE management and reaching for each item after instruction provided.                         []   Other:   []   Other:   []   Other:      Patient/Caregiver Education and Training:   [x]    Role of PT  [x]   Education about Dx  [x]   Use of call light for assist   [x]   HEP provided and explained   [x]   Treatment plan reviewed  [x]   Home safety  []   Wheelchair mobility/management   []   Body mechanics  [x]   Bed Mobility/Transfer technique  [x]   Gait technique/sequencing  [x]   Proper use of assistive device/adaptive equipment  [x]   Stair training/Advanced mobility safety and technique  []   Reinforced patient's precautions/mobility while maintaining precautions  []   Postural awareness  []   Family/caregiver training  [x]   Progress was updated and reviewed with patient  this date.  []   Other:       Treatment Plan for Next Session: dynamic balance, gait progression including turns in small spaces,

## 2024-08-06 NOTE — PROGRESS NOTES
Occupational Therapy    Physical Rehabilitation: OCCUPATIONAL THERAPY     [x] daily progress note       [] discharge       Patient Name:  Franko Odom   :  1945 MRN: [id number]  Room:  [address] [place] [address] Date of Admission: 2024  Rehabilitation Diagnosis:   Cerebral infarction, unspecified [I63.9]  Acute CVA (cerebrovascular accident) (HCC) [I63.9]       Date 2024       Day of ARU Week:  1   Time IN/OUT 5204-5481   Individual Tx Minutes 60   Group Tx Minutes    Co-Treat Minutes    Concurrent Tx Minutes    TOTAL Tx Time Mins 60   Variance Time    Variance Reason []   Refusal due to:     []   Medical hold/reason:    []   Illness   []   Off Unit for test/procedure  []   Extra time needed to complete task  []   Therapeutic need  []   Make up mins were attempted but pt unable to complete due to (specify)  []   Other (specify):   Restrictions Restrictions/Precautions: General Precautions, Fall Risk         Communication with other providers: [x]   OK to see per nursing:     []   Spoke with team member regarding:      Subjective observations and cognitive status: Pt sitting up in recliner on approach; pleasant and agreeable to therapy.      Pain level/location:    /10       Location:    Discharge recommendations  Anticipated discharge date:    Destination: []home alone   []home alone w assist prn   [x] home w/ family    [] Continuous supervision       []SNF    [] Assisted living     [] Other:   Continued therapy: [x][facility] OT  []OUTPATIENT  OT   [] No Further OT  Equipment needs: Franko Odom requires the assistance of a shower chair to successfully and safely complete bathing activities necessary due to reduced balance, endurance, need for ADL assist, and LE weakness and reduced ROM. These tasks cannot be safely completed without this device and would place Franko Odom at greater risk of falls.        ADLs:    Eating: Eating  Assistance Needed: Independent  Comment: Pt able to open all

## 2024-08-06 NOTE — PATIENT CARE CONFERENCE
Supervision or touching assistance  Comment: SB-CGA with RW  CARE Score: 4  Discharge Goal: Independent         Car Transfer  Assistance Needed: Supervision or touching assistance  Comment: SBA with RW  CARE Score: 4  Discharge Goal: Independent    Ambulation:    Walking Ability  Does the Patient Walk?: Yes     Walk 10 Feet  Assistance Needed: Supervision or touching assistance  Comment: CG-SBA for balance with RW  CARE Score: 4  Discharge Goal: Independent     Walk 50 Feet with Two Turns  Assistance Needed: Supervision or touching assistance  Comment: SB-CGA, when focused on task demos improvement with safe slow turns without R lateral lean  CARE Score: 4  Discharge Goal: Independent     Walk 150 Feet  Assistance Needed: Supervision or touching assistance  Comment: SB-CGA with RW  CARE Score: 4  Discharge Goal: Supervision or touching assistance     Walking 10 Feet on Uneven Surfaces  Assistance Needed: Supervision or touching assistance  Comment: SB-CGA with RW, demos improvement navigating AD slowly over transition and obstacles  CARE Score: 4  Discharge Goal: Independent     1 Step (Curb)  Assistance Needed: Supervision or touching assistance  Comment: CGA for balance, improved RW safety on ascent/descent  CARE Score: 4  Discharge Goal: Supervision or touching assistance     4 Steps  Assistance Needed: Supervision or touching assistance  Comment: SB-CGA non-recip pattern using rails  CARE Score: 4  Discharge Goal: Supervision or touching assistance     12 Steps  Assistance Needed: Supervision or touching assistance  Comment: SB-CGA non-recip pattern using B rails  Reason if not Attempted: Not attempted due to medical condition or safety concerns  CARE Score: 4  Discharge Goal: Supervision or touching assistance           Wheelchair:  w/c Ability: Wheelchair Ability  Uses a Wheelchair and/or Scooter?: No    Wheel 50 Feet with Two Turns  Assistance Needed: Independent  Comment: B LEs  CARE Score: 6    Wheel 150

## 2024-08-07 LAB
GLUCOSE BLD-MCNC: 156 MG/DL (ref 70–99)
GLUCOSE BLD-MCNC: 160 MG/DL (ref 70–99)
GLUCOSE BLD-MCNC: 170 MG/DL (ref 70–99)

## 2024-08-07 PROCEDURE — 97116 GAIT TRAINING THERAPY: CPT

## 2024-08-07 PROCEDURE — 97112 NEUROMUSCULAR REEDUCATION: CPT

## 2024-08-07 PROCEDURE — 82962 GLUCOSE BLOOD TEST: CPT

## 2024-08-07 PROCEDURE — 6370000000 HC RX 637 (ALT 250 FOR IP): Performed by: STUDENT IN AN ORGANIZED HEALTH CARE EDUCATION/TRAINING PROGRAM

## 2024-08-07 PROCEDURE — 94761 N-INVAS EAR/PLS OXIMETRY MLT: CPT

## 2024-08-07 PROCEDURE — 6360000002 HC RX W HCPCS: Performed by: PHYSICAL MEDICINE & REHABILITATION

## 2024-08-07 PROCEDURE — 97535 SELF CARE MNGMENT TRAINING: CPT

## 2024-08-07 PROCEDURE — 6370000000 HC RX 637 (ALT 250 FOR IP): Performed by: PHYSICAL MEDICINE & REHABILITATION

## 2024-08-07 PROCEDURE — 6370000000 HC RX 637 (ALT 250 FOR IP)

## 2024-08-07 PROCEDURE — 97530 THERAPEUTIC ACTIVITIES: CPT

## 2024-08-07 PROCEDURE — 1280000000 HC REHAB R&B

## 2024-08-07 PROCEDURE — 97110 THERAPEUTIC EXERCISES: CPT

## 2024-08-07 PROCEDURE — 97542 WHEELCHAIR MNGMENT TRAINING: CPT

## 2024-08-07 PROCEDURE — 2700000000 HC OXYGEN THERAPY PER DAY

## 2024-08-07 RX ADMIN — ALOGLIPTIN 25 MG: 12.5 TABLET, FILM COATED ORAL at 10:24

## 2024-08-07 RX ADMIN — CLOPIDOGREL BISULFATE 75 MG: 75 TABLET ORAL at 10:25

## 2024-08-07 RX ADMIN — RANOLAZINE 500 MG: 500 TABLET, EXTENDED RELEASE ORAL at 10:24

## 2024-08-07 RX ADMIN — ENOXAPARIN SODIUM 40 MG: 100 INJECTION SUBCUTANEOUS at 17:47

## 2024-08-07 RX ADMIN — Medication 100 MG: at 10:26

## 2024-08-07 RX ADMIN — METFORMIN HYDROCHLORIDE 1000 MG: 500 TABLET, FILM COATED ORAL at 10:25

## 2024-08-07 RX ADMIN — LOSARTAN POTASSIUM 25 MG: 25 TABLET, FILM COATED ORAL at 10:24

## 2024-08-07 RX ADMIN — ROSUVASTATIN CALCIUM 20 MG: 20 TABLET, FILM COATED ORAL at 21:42

## 2024-08-07 RX ADMIN — Medication 1 TABLET: at 10:25

## 2024-08-07 RX ADMIN — ZINC SULFATE 220 MG (50 MG) CAPSULE 50 MG: CAPSULE at 10:25

## 2024-08-07 RX ADMIN — CARVEDILOL 6.25 MG: 6.25 TABLET, FILM COATED ORAL at 17:44

## 2024-08-07 RX ADMIN — ACETAMINOPHEN 650 MG: 325 TABLET ORAL at 05:19

## 2024-08-07 RX ADMIN — RANOLAZINE 500 MG: 500 TABLET, EXTENDED RELEASE ORAL at 21:42

## 2024-08-07 RX ADMIN — ASPIRIN 81 MG CHEWABLE TABLET 81 MG: 81 TABLET CHEWABLE at 10:29

## 2024-08-07 RX ADMIN — METFORMIN HYDROCHLORIDE 1000 MG: 500 TABLET, FILM COATED ORAL at 17:45

## 2024-08-07 RX ADMIN — FOLIC ACID 1 MG: 1 TABLET ORAL at 10:24

## 2024-08-07 RX ADMIN — EMPAGLIFLOZIN 20 MG: 10 TABLET, FILM COATED ORAL at 10:25

## 2024-08-07 RX ADMIN — CARVEDILOL 6.25 MG: 6.25 TABLET, FILM COATED ORAL at 10:26

## 2024-08-07 ASSESSMENT — PAIN DESCRIPTION - LOCATION: LOCATION: GENERALIZED

## 2024-08-07 ASSESSMENT — PAIN SCALES - GENERAL
PAINLEVEL_OUTOF10: 2
PAINLEVEL_OUTOF10: 0
PAINLEVEL_OUTOF10: 0

## 2024-08-07 ASSESSMENT — PAIN - FUNCTIONAL ASSESSMENT: PAIN_FUNCTIONAL_ASSESSMENT: ACTIVITIES ARE NOT PREVENTED

## 2024-08-07 NOTE — PROGRESS NOTES
assistance  Comment: Pt stood at sink for ~3.5 mins c SBA to complete oral hygiene  CARE Score: 4  Discharge Goal: Independent      UB Dressing: Upper Body Dressing  Assistance Needed: Setup or clean-up assistance  Comment: to doff/don T shirt  Reason if not Attempted: Not attempted due to environmental limitations  CARE Score: 5  Discharge Goal: Independent         LB Dressing: Lower Body Dressing  Assistance Needed: Supervision or touching assistance  Comment: pt able to thread BLEs into pants and manage up over hips c SBA  CARE Score: 4  Discharge Goal: Independent    Donning and [place] Footwear: Putting On/Taking Off Footwear  Assistance Needed: Independent  Comment: to doff/don socks and shoes  CARE Score: 6  Discharge Goal: Independent      Toileting: Toileting Hygiene  Assistance needed: Supervision or touching assistance  Comment: SBA for clothing management; pt urinated only  CARE Score: 4  Discharge Goal: Independent      Toilet Transfers:   CGA Toilet Transfer  Assistance needed: Supervision or touching assistance  Comment: CGA c RW, cues not to abandon walker, uses grab bars  CARE Score: 4  Discharge Goal: Independent  Device Used:    [x]   Standard Toilet         [x]   Grab Bars           []  Bedside Commode       []   Elevated Toilet          []   Other:        Bed Mobility:           []   Pt received out of bed       Transfers:    Sit--> Stand:  SBA  Stand --> Sit:   SBA  Stand-Pivot:   CGA/min A, cues to not abandon walker   Other:    Assistive device required for transfer:   RW       Functional Mobility:  to bathroom c CGA d/t R lateral lean   150 ft x 2 c CGA d/t R lateral lean  Assistance:    Device:   []   Rolling Walker     []   Standard Walker []   Wheelchair        []   Cane       []   4-Wheeled Walker         []   Cardiac Walker       []   Other:        Homemaking Tasks:   Pt retrieved clothing from closet, cues for safety for reaching outside base of support and had slight LOB requiring min

## 2024-08-07 NOTE — CARE COORDINATION
Discussed discharge with patient and provided a copy of the Important Message from Medicare form signed upon admission. Patient verbalized understanding.

## 2024-08-07 NOTE — PROGRESS NOTES
Physical Therapy      [x] daily progress note       [] discharge       Patient Name:  Franko Odom   :  1945 MRN: [id number]  Room:  [address] [place], [place] [address] Date of Admission: 2024  Rehabilitation Diagnosis:   Cerebral infarction, unspecified [I63.9]  Acute CVA (cerebrovascular accident) (HCC) [I63.9]       Date 2024       Day of ARU Week:  2   Time IN/OUT 1403-2394   Individual Tx Minutes 60   TOTAL Tx Time Mins 60   Variance Time    Variance Time []   Refusal due to:     []   Medical hold/reason:    []   Illness   []   Off Unit for test/procedure  []   Extra time needed to complete task  []   Therapeutic need  []   Other (specify):   Restrictions Restrictions/Precautions  Restrictions/Precautions: General Precautions, Fall Risk      Communication with other providers: [x]   OK to see per nursing:     []   Spoke with team member regarding:      Subjective observations and cognitive status: Pt resting in recliner, motivated to participate. Discussed with pt his current gait deficits and A req for ambulation due to occasional LOB to R. Pt in agreement that spouse unable to physically A him at home due to her own balance deficits. Discussed use of WC in home as option for mobility for decreased risk for falls. Pt in agreement and reports he feels home is WC accessible \"for the most part\" and reports doorways are ~ 36\" width. Reports is able to fit WC into bedroom to transfer into bed, and that WC would fit between sink and walk in shower to get to the commode. Discussed with OT regarding pt's possible need for BSC frame over toilet. Pt to check if he has BSC at home. Discussed need for WC to CM. Pt reports son works from noon until \"about 11 at night\". Reported to pt that he could continue amb with [facility] PT and that training could be provided to son if needed but WC mobility recommended when son not present; Discussed having son present at discharge for safety with home entry at front entrance vs garage; Pt to

## 2024-08-07 NOTE — PROGRESS NOTES
Ability: Wheelchair Ability  Uses a Wheelchair and/or Scooter?: No                Balance:        Object: Picking Up Object  Assistance Needed: Partial/moderate assistance  Comment: min assist for balance with 2ww and reacher  CARE Score: 3  Discharge Goal: Independent    I      Exam:    Blood pressure 127/76, pulse 74, temperature 97.9 °F (36.6 °C), temperature source Oral, resp. rate 18, height 1.8 m (5' 10.87\"), weight 93.5 kg (206 lb 2.1 oz), SpO2 96 %.    General: ***    HEENT: ***    Pulmonary: ***    Cardiac: ***    Abdomen: Patient's abdomen is soft and nondistended.  Bowel sounds were present throughout.  There was no rebound, guarding or masses noted.    Upper extremities: ***    Lower extremities: ***    Sitting balance was ***.  Standing balance was ***.    Lab Results   Component Value Date    WBC 9.7 07/29/2024    HGB 15.4 07/29/2024    HCT 45.9 07/29/2024    MCV 92.7 07/29/2024     07/29/2024     Lab Results   Component Value Date    INR 1.0 07/27/2024    INR 1.24 09/13/2016    INR 1.03 09/08/2016    PROTIME 14.0 07/27/2024    PROTIME 14.2 (H) 09/13/2016    PROTIME 11.8 09/08/2016     Lab Results   Component Value Date    CREATININE 0.9 07/29/2024    BUN 24 (H) 07/29/2024     07/29/2024    K 4.3 07/29/2024     07/29/2024    CO2 21 07/29/2024     Lab Results   Component Value Date    ALT 20 07/27/2024    AST 24 07/27/2024    ALKPHOS 57 07/27/2024    BILITOT 0.6 07/27/2024       Expected length of stay  prior to a supervised level of function for discharge home with a walker and HHC OT/PT is ***    Recommendations:    ***

## 2024-08-08 ENCOUNTER — CARE COORDINATION (OUTPATIENT)
Dept: MEDSURG UNIT | Age: 79
End: 2024-08-08

## 2024-08-08 LAB
GLUCOSE BLD-MCNC: 149 MG/DL (ref 70–99)
GLUCOSE BLD-MCNC: 169 MG/DL (ref 70–99)

## 2024-08-08 PROCEDURE — 6370000000 HC RX 637 (ALT 250 FOR IP): Performed by: STUDENT IN AN ORGANIZED HEALTH CARE EDUCATION/TRAINING PROGRAM

## 2024-08-08 PROCEDURE — 97530 THERAPEUTIC ACTIVITIES: CPT

## 2024-08-08 PROCEDURE — 82962 GLUCOSE BLOOD TEST: CPT

## 2024-08-08 PROCEDURE — 6370000000 HC RX 637 (ALT 250 FOR IP): Performed by: PHYSICAL MEDICINE & REHABILITATION

## 2024-08-08 PROCEDURE — 6370000000 HC RX 637 (ALT 250 FOR IP)

## 2024-08-08 PROCEDURE — 97110 THERAPEUTIC EXERCISES: CPT

## 2024-08-08 PROCEDURE — 94761 N-INVAS EAR/PLS OXIMETRY MLT: CPT

## 2024-08-08 PROCEDURE — 97535 SELF CARE MNGMENT TRAINING: CPT

## 2024-08-08 PROCEDURE — 97116 GAIT TRAINING THERAPY: CPT

## 2024-08-08 PROCEDURE — 1280000000 HC REHAB R&B

## 2024-08-08 RX ORDER — ASPIRIN 81 MG/1
81 TABLET, CHEWABLE ORAL DAILY
Qty: 11 TABLET | Refills: 0 | COMMUNITY
Start: 2024-08-09 | End: 2024-08-20

## 2024-08-08 RX ORDER — FOLIC ACID 1 MG/1
1 TABLET ORAL DAILY
Qty: 30 TABLET | Refills: 0 | Status: SHIPPED | OUTPATIENT
Start: 2024-08-09

## 2024-08-08 RX ORDER — ZINC SULFATE 50(220)MG
50 CAPSULE ORAL DAILY
Qty: 30 CAPSULE | Refills: 0 | Status: SHIPPED | OUTPATIENT
Start: 2024-08-09

## 2024-08-08 RX ORDER — LANOLIN ALCOHOL/MO/W.PET/CERES
100 CREAM (GRAM) TOPICAL DAILY
Qty: 30 TABLET | Refills: 0 | Status: SHIPPED | OUTPATIENT
Start: 2024-08-09

## 2024-08-08 RX ORDER — LOSARTAN POTASSIUM 25 MG/1
25 TABLET ORAL DAILY
Qty: 30 TABLET | Refills: 0 | Status: SHIPPED | OUTPATIENT
Start: 2024-08-09

## 2024-08-08 RX ORDER — CLOPIDOGREL BISULFATE 75 MG/1
75 TABLET ORAL DAILY
Qty: 30 TABLET | Refills: 0 | Status: SHIPPED | OUTPATIENT
Start: 2024-08-09

## 2024-08-08 RX ADMIN — LOSARTAN POTASSIUM 25 MG: 25 TABLET, FILM COATED ORAL at 09:12

## 2024-08-08 RX ADMIN — METFORMIN HYDROCHLORIDE 1000 MG: 500 TABLET, FILM COATED ORAL at 09:12

## 2024-08-08 RX ADMIN — Medication 1 TABLET: at 09:12

## 2024-08-08 RX ADMIN — ACETAMINOPHEN 650 MG: 325 TABLET ORAL at 06:31

## 2024-08-08 RX ADMIN — EMPAGLIFLOZIN 20 MG: 10 TABLET, FILM COATED ORAL at 09:12

## 2024-08-08 RX ADMIN — CLOPIDOGREL BISULFATE 75 MG: 75 TABLET ORAL at 09:12

## 2024-08-08 RX ADMIN — METFORMIN HYDROCHLORIDE 1000 MG: 500 TABLET, FILM COATED ORAL at 17:05

## 2024-08-08 RX ADMIN — FOLIC ACID 1 MG: 1 TABLET ORAL at 09:12

## 2024-08-08 RX ADMIN — ROSUVASTATIN CALCIUM 20 MG: 20 TABLET, FILM COATED ORAL at 21:17

## 2024-08-08 RX ADMIN — ALOGLIPTIN 25 MG: 12.5 TABLET, FILM COATED ORAL at 09:12

## 2024-08-08 RX ADMIN — RANOLAZINE 500 MG: 500 TABLET, EXTENDED RELEASE ORAL at 09:12

## 2024-08-08 RX ADMIN — ASPIRIN 81 MG CHEWABLE TABLET 81 MG: 81 TABLET CHEWABLE at 09:12

## 2024-08-08 RX ADMIN — ZINC SULFATE 220 MG (50 MG) CAPSULE 50 MG: CAPSULE at 09:13

## 2024-08-08 RX ADMIN — CARVEDILOL 6.25 MG: 6.25 TABLET, FILM COATED ORAL at 09:12

## 2024-08-08 RX ADMIN — RANOLAZINE 500 MG: 500 TABLET, EXTENDED RELEASE ORAL at 21:17

## 2024-08-08 RX ADMIN — Medication 100 MG: at 09:12

## 2024-08-08 RX ADMIN — CARVEDILOL 6.25 MG: 6.25 TABLET, FILM COATED ORAL at 17:05

## 2024-08-08 ASSESSMENT — PAIN SCALES - GENERAL: PAINLEVEL_OUTOF10: 0

## 2024-08-08 NOTE — PROGRESS NOTES
Comprehensive Nutrition Assessment    Type and Reason for Visit:  Reassess    Nutrition Recommendations/Plan:   Continue carb controlled diet      Malnutrition Assessment:  Malnutrition Status:  No malnutrition (07/31/24 9688)    Context:  Acute Illness       Nutrition Assessment:    Remains with good appetite and meal intake %. Patient with no concerns regarding carb controlled diet. Will continue to follow at low nutrition risk with adequate intake during stay.    Nutrition Related Findings:    up in chair finishing lunch, glucose POCT under 180 mg/dL   discharge planned for tomorrow Wound Type: None       Current Nutrition Intake & Therapies:    Average Meal Intake: %  Average Supplements Intake: None Ordered  ADULT DIET; Regular; 4 carb choices (60 gm/meal)    Anthropometric Measures:  Height: 180 cm (5' 10.87\")  Ideal Body Weight (IBW): 171 lbs (78 kg)    Admission Body Weight: 94.8 kg (208 lb 15.9 oz)  Current Body Weight: 92.5 kg (203 lb 14.8 oz), 122.2 % IBW. Weight Source: Bed Scale  Current BMI (kg/m2): 28.5  Usual Body Weight: 97.5 kg (215 lb) (Dec 2023)  % Weight Change (Calculated): -2.8  Weight Adjustment For: No Adjustment                 BMI Categories: Overweight (BMI 25.0-29.9)    Estimated Daily Nutrient Needs:  Energy Requirements Based On: Formula  Weight Used for Energy Requirements: Current  Energy (kcal/day): 3101-5375 ([address] jeor)  Weight Used for Protein Requirements: Ideal  Protein (g/day): 78-94 (1-1.2 g/kg)  Method Used for Fluid Requirements: 1 ml/kcal  Fluid (ml/day): 2000    Nutrition Diagnosis:   No nutrition diagnosis at this time related to   as evidenced by      Nutrition Interventions:   Food and/or Nutrient Delivery: Continue Current Diet  Nutrition Education/Counseling: Education initiated  Coordination of Nutrition Care: Continue to monitor while inpatient  Plan of Care discussed with: patient    Goals:  Previous Goal Met: Goal(s) Achieved  Goals: PO intake

## 2024-08-08 NOTE — PROGRESS NOTES
precautions  []   Postural awareness  []   Family training      Treatment Plan for Next Session: Pt to be discharged to home with continuous support of family. [facility] PT recommended; amb with A of son with RW; when son at work pt to be WC level.     Treatment/Activity Tolerance:   [x] Tolerated treatment with no adverse effects    [] Patient limited by fatigue  [] Patient limited by pain   [] Patient limited by medical complications:    [] Adverse reaction to Tx:   [] Significant change in status    Safety:       []  bed alarm set    [x]  chair alarm set    []  Pt refused alarms                []  Telesitter activated      [x]  Gait belt used during tx session      []other:         Number of Minutes/Billable Intervention  Gait Training 30   Therapeutic Exercise    Neuro Re-Ed    Therapeutic Activity 30   Wheelchair Propulsion    Group    Other:    TOTAL 60         Social History  Social/Functional History  Lives With: Family (Wife Georgia and son Musa (works fulltime))  Type of Home: House  Home Layout: Two level, Laundry in basement  Home Access: Stairs to enter with rails  Entrance Stairs - Number of Steps: 13 YANIRA c R ascending rail.  Pt's garage is attached to basement (this is level entry), then has full flight up to main level of house.  Entrance Stairs - Rails: Right  Bathroom Shower/Tub: Walk-in shower  Bathroom Toilet: Handicap height (uses nearby vanity to assist with transfer)  Bathroom Equipment: Grab bars in shower  Bathroom Accessibility: Walker accessible  Home Equipment: Walker - Rolling (RW is wife's (she's not using))  Has the patient had two or more falls in the past year or any fall with injury in the past year?: No  ADL Assistance: Independent  Homemaking Responsibilities: Yes  Meal Prep Responsibility: Secondary  Laundry Responsibility: Secondary (wife typically does laundry but he carries basket upstairs)  Cleaning Responsibility: Secondary  Bill Paying/Finance Responsibility: Primary  Shopping

## 2024-08-08 NOTE — PLAN OF CARE
Problem: Discharge Planning  Goal: Discharge to home or other facility with appropriate resources  8/8/2024 1157 by Hardy Castillo LPN  Outcome: Progressing  8/7/2024 2258 by Rubina Ortega LPN  Outcome: Progressing     Problem: Safety - Adult  Goal: Free from fall injury  8/8/2024 1157 by Hardy Castillo LPN  Outcome: Progressing  8/7/2024 2258 by Rubina Ortega LPN  Outcome: Progressing     Problem: ABCDS Injury Assessment  Goal: Absence of physical injury  8/8/2024 1157 by Hardy Castillo LPN  Outcome: Progressing  8/7/2024 2258 by Rubina Ortega LPN  Outcome: Progressing     Problem: Pain  Goal: Verbalizes/displays adequate comfort level or baseline comfort level  8/8/2024 1157 by Hardy Castillo LPN  Outcome: Progressing  8/7/2024 2258 by Rubina Ortega LPN  Outcome: Progressing     Problem: Skin/Tissue Integrity  Goal: Absence of new skin breakdown  Description: 1.  Monitor for areas of redness and/or skin breakdown  2.  Assess vascular access sites hourly  3.  Every 4-6 hours minimum:  Change oxygen saturation probe site  4.  Every 4-6 hours:  If on nasal continuous positive airway pressure, respiratory therapy assess nares and determine need for appliance change or resting period.  8/8/2024 1157 by Hardy Castillo LPN  Outcome: Progressing  8/7/2024 2258 by Rubina Ortega LPN  Outcome: Progressing     Problem: Skin/Tissue Integrity - Adult  Goal: Skin integrity remains intact  8/8/2024 1157 by Hardy Castillo LPN  Outcome: Progressing  8/7/2024 2258 by Rubina Ortega LPN  Outcome: Progressing  Flowsheets (Taken 8/7/2024 2015)  Skin Integrity Remains Intact: Monitor for areas of redness and/or skin breakdown  Goal: Incisions, wounds, or drain sites healing without S/S of infection  Outcome: Progressing     Problem: Musculoskeletal - Adult  Goal: Return mobility to safest level of function  8/8/2024 1157 by Hardy Castillo LPN  Outcome: Progressing  8/7/2024 2258 by

## 2024-08-08 NOTE — PROGRESS NOTES
Treatment/Activity Tolerance:   [x] Tolerated treatment with no adverse effects    [] Patient limited by fatigue  [] Patient limited by pain   [] Patient limited by medical complications:    [] Adverse reaction to Tx:   [] Significant change in status    Safety:       []  bed alarm set    [x]  chair alarm set    []  Pt refused alarms                []  Telesitter activated      [x]  Gait belt used during tx session      []other:       Number of Minutes/Billable Intervention  Therapeutic Exercise 15   ADL Self-care 45   Neuro Re-Ed    Therapeutic Activity    Group    Other:    TOTAL 60       Social History  Social/Functional History  Lives With: Family (Wife Georgia and son Musa (works fulltime))  Type of Home: House  Home Layout: Two level, Laundry in basement  Home Access: Stairs to enter with rails  Entrance Stairs - Number of Steps: 13 YANIRA c R ascending rail.  Pt's garage is attached to basement (this is level entry), then has full flight up to main level of house.  Entrance Stairs - Rails: Right  Bathroom Shower/Tub: Walk-in shower  Bathroom Toilet: Handicap height (uses nearby vanity to assist with transfer)  Bathroom Equipment: Grab bars in shower  Bathroom Accessibility: Walker accessible  Home Equipment: Walker - Rolling (RW is wife's (she's not using))  Has the patient had two or more falls in the past year or any fall with injury in the past year?: No  ADL Assistance: Independent  Homemaking Responsibilities: Yes  Meal Prep Responsibility: Secondary  Laundry Responsibility: Secondary (wife typically does laundry but he carries basket upstairs)  Cleaning Responsibility: Secondary  Bill Paying/Finance Responsibility: Primary  Shopping Responsibility: Secondary (typically goes with wife)  Dependent Care Responsibility: No  Health Care Management: Primary (uses a pillbox)  Ambulation Assistance: Independent  Transfer Assistance: Independent  Active : Yes  Mode of Transportation: Truck  Education:

## 2024-08-08 NOTE — PROGRESS NOTES
Franko Odom    : 1945  Acct #: [id number]  MRN: [id number]              PM&R Progress Note      Admitting diagnosis: ***    Comorbid diagnoses impacting rehabilitation: ***    Chief complaint: ***    Prior (baseline) level of function: Independent.    Current level of function:         Current  IRF-LY and Goals:   Occupational Therapy:    Short Term Goals  Time Frame for Short Term Goals: STGs=LTGs :   Long Term Goals  Time Frame for Long Term Goals : 10 days or until d/c  Long Term Goal 1: Pt will complete grooming tasks Ind  Long Term Goal 2: Pt will complete total body bathing mod I  Long Term Goal 3: Pt will complete UB dressing Ind  Long Term Goal 4: Pt will complete LB dressing Ind  Long Term Goal 5: Pt will doff/don footwear Ind  Additional Goals?: Yes  Long Term Goal 6: Pt will complete toileting mod I  Long Term Goal 7: Pt will complete functional transfers (bed, chair, toilet, shower) c DME PRN and mod I  Long Term Goal 8: Pt will perform therex/therax to facilitate increased strength/endurance/ax tolerance (c emphasis on dynamic standing balance/tolerance >10 mins, RUE coordination) c SBA  Long Term Goal 9: Pt will complete simple homemaking tasks c DME PRN and mod I :                                       Eating: Eating  Assistance Needed: Independent  Comment: Pt able to open all containers/packages and feed self  CARE Score: 6  Discharge Goal: Independent       Oral Hygiene: Oral Hygiene  Assistance Needed: Supervision or touching assistance  Comment: Pt stood at sink for ~3.5 mins c SBA to complete oral hygiene  CARE Score: 4  Discharge Goal: Independent    UB/LB Bathing: Shower/Bathe Self  Assistance Needed: Supervision or touching assistance  Comment: Pt completed full shower c SUP; lateral lean to wash bottom  CARE Score: 4  Discharge Goal: Independent    UB Dressing: Upper Body Dressing  Assistance Needed: Setup or clean-up assistance  Comment: to doff/don T shirt  Reason if not

## 2024-08-08 NOTE — CARE COORDINATION
Order for WC and BSC was faxed to [facility].     LISW met with patient and spouse. Provided written communication following Care Conference. LISW informed patient that WC, BSC, [facility] PT, OT have been ordered. Patient verbalized understanding. Whiteboard updated.       D/C Plan:  Estimated Date: Aug 9  DME: KATINA, BSC ([facility])  HHC:  PT, OT ([facility])  To:  Home with spouse (spouse will transport)

## 2024-08-08 NOTE — PROGRESS NOTES
--> Sit:   Sup  Stand-Pivot:   SBA  Other:    Assistive device required for transfer:   RW      Functional Mobility:  throughout ARU and throughout obstacle course for increase safety with wc mobility   Assistance:    Device:   []   Rolling Walker     []   Standard Walker []   Wheelchair        []   Cane       []   4-Wheeled Walker         []   Cardiac Walker       []   Other:          Additional Therapeutic activities/exercises completed this date:     []   ADL Training   [x]   Balance/Postural training reaching outside of ERIN and crossing midline at countertop     []   Bed/Transfer Training   [x]   Endurance Training Arm bike 12 min    []   Neuromuscular Re-ed   []   Nu-step:  Time:        Level:         #Steps:       []   Rebounder:    []  Seated     []  Standing        []   Supine Ther Ex (reps/sets):     [x]   Seated Ther Ex (reps/sets):  3# dowel and 4# weighted ball    []   Standing Ther Ex (reps/sets):     []   Other:      Comments:      Patient/Caregiver Education and Training:   [x]   Adaptive Equipment Use  [x]   Bed Mobility/Transfer Technique/Safety  [x]   Energy Conservation Tips  []   Family training  [x]   Postural Awareness  [x]   Safety During Functional Activities  []   Reinforced Patient's Precautions       Treatment Plan for Next Session: POC to continue  as tolerated        Treatment/Activity Tolerance:   [x] Tolerated treatment with no adverse effects    [] Patient limited by fatigue  [] Patient limited by pain   [] Patient limited by medical complications:    [] Adverse reaction to Tx:   [] Significant change in status    Safety:       []  bed alarm set    []  chair alarm set    []  Pt refused alarms                []  Telesitter activated      []  Gait belt used during tx session      []other:       Number of Minutes/Billable Intervention  Therapeutic Exercise 15   ADL Self-care    Neuro Re-Ed    Therapeutic Activity 45   Group    Other:    TOTAL 60       Social History  Social/Functional

## 2024-08-08 NOTE — CARE COORDINATION
Provided stroke education folder.  Discussed BEFAST, pt's personal stroke risk factors including HTN, HLD, and DM, medications, and stroke clinic follow up with pt.  Made pt aware that I can be contacted for any additional questions regarding stroke and left my contact information.   f

## 2024-08-09 VITALS
WEIGHT: 203.93 LBS | TEMPERATURE: 97.6 F | OXYGEN SATURATION: 96 % | BODY MASS INDEX: 28.55 KG/M2 | DIASTOLIC BLOOD PRESSURE: 77 MMHG | HEIGHT: 71 IN | HEART RATE: 83 BPM | RESPIRATION RATE: 18 BRPM | SYSTOLIC BLOOD PRESSURE: 115 MMHG

## 2024-08-09 LAB
GLUCOSE BLD-MCNC: 153 MG/DL (ref 70–99)
GLUCOSE BLD-MCNC: 154 MG/DL (ref 70–99)

## 2024-08-09 PROCEDURE — 6370000000 HC RX 637 (ALT 250 FOR IP): Performed by: STUDENT IN AN ORGANIZED HEALTH CARE EDUCATION/TRAINING PROGRAM

## 2024-08-09 PROCEDURE — 82962 GLUCOSE BLOOD TEST: CPT

## 2024-08-09 RX ADMIN — RANOLAZINE 500 MG: 500 TABLET, EXTENDED RELEASE ORAL at 10:40

## 2024-08-09 RX ADMIN — ZINC SULFATE 220 MG (50 MG) CAPSULE 50 MG: CAPSULE at 10:40

## 2024-08-09 RX ADMIN — FOLIC ACID 1 MG: 1 TABLET ORAL at 10:40

## 2024-08-09 RX ADMIN — CLOPIDOGREL BISULFATE 75 MG: 75 TABLET ORAL at 10:40

## 2024-08-09 RX ADMIN — Medication 1 TABLET: at 10:40

## 2024-08-09 RX ADMIN — LOSARTAN POTASSIUM 25 MG: 25 TABLET, FILM COATED ORAL at 10:41

## 2024-08-09 RX ADMIN — ASPIRIN 81 MG CHEWABLE TABLET 81 MG: 81 TABLET CHEWABLE at 10:41

## 2024-08-09 RX ADMIN — CARVEDILOL 6.25 MG: 6.25 TABLET, FILM COATED ORAL at 10:44

## 2024-08-09 RX ADMIN — ALOGLIPTIN 25 MG: 12.5 TABLET, FILM COATED ORAL at 10:40

## 2024-08-09 RX ADMIN — EMPAGLIFLOZIN 20 MG: 10 TABLET, FILM COATED ORAL at 10:40

## 2024-08-09 RX ADMIN — METFORMIN HYDROCHLORIDE 1000 MG: 500 TABLET, FILM COATED ORAL at 10:45

## 2024-08-09 RX ADMIN — Medication 100 MG: at 10:41

## 2024-08-09 NOTE — CARE COORDINATION
ARU  Discharge Summary    D/C Date: 08/09/2024    Patient discharged to: Home with spouse    Transported by: Spouse    Referrals made to: [facility] and [facility]    Additional information: LISW notified the offices of Ariel Meyer MD and Teresa Cleaning DO of discharge. Patient to call and schedule follow ups. Patient notified, AVS updated.     Caregiver training: none requested spouse here daily    Discharge BIMS completed: [x]      IMM:  [x]       Discharge clinical was sent to [facility] via routed fax 2:08 PM.    Discharge Assessment: At the time of discharge   [x] OT Note 08/08 -Patient fully participated in the program and made good progress towards established goals.  Unfortunately, pt's R lateral lean/balance did not improve enough to safely recommend only RW at discharge, also recommended W/C to reduce fall risk.

## 2024-08-09 NOTE — PROGRESS NOTES
ARU Discharge Assessment - [facility]    Transportation  \"In the past 6-12 months, has lack of transportation kept you from medical appointments, meetings, work, or from getting things needed for daily living?\"Check all that apply:  [] A.  Yes, it has kept me from medical appointments or from getting my medications  [] B.  Yes, it has kept me from non-medical meetings, appointments, work, or from getting things that I need  [x] C.  No  [] X.  Patient unable to respond    Provision of Current Reconciled Medication List to Subsequent Provider at Discharge     [] No, current reconciled medication list not provided to the subsequent provider.  NO if D/C home with Outpatient or no services   [x] Yes, current reconciled medication list provided to the subsequent provider.           YES if D/C to Acute hospital, SNF, home with home health  (**We MUST Select route of transmission below**)      [] Via Electronic Health Record   [] Via Health Information Exchange Organization  [] Verbal (e.g. in person, telephone, video conferencing)  [x] Paper-based (e.g. fax, copies, printouts)   [] Other Methods (e.g. texting, email, CDs)    Provision of Current Reconciled Medication List to Patient at Discharge  [x] No, current reconciled medication list not provided to the patient, family and/or caregiver. NO If D/C to Acute hospital, SNF, home with home health   [] Yes, current reconciled medication list provided to the patient, family and/or caregiver.  YES if D/C home with Outpatient or no services   (**WE MUST Select route of transmission below**)     [] Via Electronic Health Record (e.g., electronic access to patient portal)   [] Via Health Information Exchange Organization  [] Verbal (e.g. in person, telephone, video conferencing)  [] Paper-based (e.g. fax, copies, printouts)   [] Other Methods (e.g. texting, email, CDs)    Health Literacy  \"How often do you need to have someone help you when you read instructions,

## 2024-08-09 NOTE — DISCHARGE INSTRUCTIONS
Your information:  Name: Franko Odom  : 1945    Your instructions:    Pt is discharging to home with [facility]  [address], [address], [place] [id number]  [phone]  A representative from [facility] will contact you at home to schedule your home care needs.    Pt will discharge home with medical supplies from   [facility]  [address], [place] [id number]  [phone]    What to do after you leave the hospital:    Recommended diet: {diet:69979}    Recommended activity: {discharge activity:87698}    The following personal items were collected during your admission and were returned to you:    Belongings  Dental Appliances: None  Vision - Corrective Lenses: Eyeglasses  Hearing Aid: Bilateral hearing aids (wife has)  Clothing: Pants, Shirt, Slippers, Undergarments, Footwear  Jewelry: None  Body Piercings Removed: N/A  Electronic Devices: None  Weapons (Notify Protective Services/Security): None  Home Medications: None  Valuables Given To: Family (Comment)  Provide Name(s) of Who Valuable(s) Were Given To: [place]    Information obtained by:  By signing below, I understand that if any problems occur once I leave the hospital I am to contact ***.  I understand and acknowledge receipt of the instructions indicated above.

## 2024-08-09 NOTE — PROGRESS NOTES
limitations  CARE Score: 6  Discharge Goal: Independent         LB Dressing: Lower Body Dressing  Assistance Needed: Supervision or touching assistance  Comment: Able to thread BLEs into pants and manage up over hips c SB/SUP  CARE Score: 4  Discharge Goal: Independent    Donning and [place] Footwear: Putting On/Taking Off Footwear  Assistance Needed: Independent  Comment: to doff/don socks and shoes  CARE Score: 6  Discharge Goal: Independent      Toileting: Toileting Hygiene  Assistance needed: Supervision or touching assistance  Comment: SBA for clothing management; pt urinated seated  CARE Score: 4  Discharge Goal: Independent      Toilet Transfers:  Toilet Transfer  Assistance needed: Supervision or touching assistance  Comment: SBA c RW and grab bars  CARE Score: 4  Discharge Goal: Independent    Physical Therapy:   Short Term Goals  Time Frame for Short Term Goals: 14 days STG=LTG  Short Term Goal 1: Pt will perform bed mobiltiy with mod I  Short Term Goal 2: Pt will perform all functional transfers with mod I  Short Term Goal 3: Pt will perform gait with 2ww 50' on level surfaces and 10' on unlevel surfaces with mod I, 150' with supervision on level surfaces  Short Term Goal 4: Pt will negotiate curb step with 2ww and 12 steps with R rail ascending/L descending with supervision,  Short Term Goal 5: Pt will  light object from floor with 2ww and reacher with mod I            Bed Mobility:   Sit to Lying  Assistance Needed: Independent  Comment: standard bed  CARE Score: 6  Discharge Goal: Independent  Roll Left and Right  Assistance Needed: Independent  Comment: standard bed'  CARE Score: 6  Discharge Goal: Independent  Lying to Sitting on Side of Bed  Assistance Needed: Independent  Comment: standard bed  CARE Score: 6  Discharge Goal: Independent    Transfers:    Sit to Stand  Assistance Needed: Supervision or touching assistance  Comment: SBA with RW  CARE Score: 4  Discharge Goal:

## 2024-08-09 NOTE — TELEPHONE ENCOUNTER
Patient called the office. Appointment scheduled with Yessica FIORE on Wednesday, 8/28/24 @ 1 pm.  Appointment reminder letter sent to patient via mail along with map/directions to our office. Patient agreeable and verbalized understanding. Nothing further needed.

## 2024-08-12 ENCOUNTER — TELEPHONE (OUTPATIENT)
Dept: CARDIOLOGY CLINIC | Age: 79
End: 2024-08-12

## 2024-08-12 RX ORDER — THIAMINE MONONITRATE (VIT B1) 100 MG
100 TABLET ORAL DAILY
Qty: 90 TABLET | Refills: 3 | Status: SHIPPED | OUTPATIENT
Start: 2024-08-12

## 2024-08-12 RX ORDER — FOLIC ACID 1 MG/1
1 TABLET ORAL DAILY
Qty: 90 TABLET | Refills: 1 | Status: SHIPPED | OUTPATIENT
Start: 2024-08-12

## 2024-08-12 RX ORDER — CLOPIDOGREL BISULFATE 75 MG/1
75 TABLET ORAL DAILY
Qty: 90 TABLET | Refills: 0 | Status: SHIPPED | OUTPATIENT
Start: 2024-08-12

## 2024-08-12 NOTE — TELEPHONE ENCOUNTER
[facility] called and patient was in hospital for CVA and is out and was suppose to be taking Plavix 75mg, folic acid, and thiamine but wasn't sent home with a script. Would like to know if you can call these into [facility] in [place] if possible.

## 2024-08-23 NOTE — PROGRESS NOTES
Outpatient Vascular Neurology Service Consult Note     Patient Name: Franko Odom  : 1945        Subjective:   Reason for consult:   Franko Odom is a  78 -year-old male with a PMH HTN, HLD, DM, CAD CABG, arthritis, chronic back pain, GERD presenting for hospital follow-up stroke..  The patient was admitted to [facility] as a transfer from [place] ED on  - 2024 with complaints of difficulty walking and double vision.  He did receive TNK.  CT head nonacute.  CTA no critical stenosis, aneurysm or occlusion identified.  MRI brain showed acute infarct in the right middle cerebellar peduncle   Verify now aspirin was subtherapeutic.  He was continued on DAPT with aspirin and Plavix for 21 days followed by monotherapy with Plavix thereafter.  He was discharged to ARU for continued therapies.    TTE EF 55 to 60% bubble study negative no ASD or PFO  .LDL 86.  Hemoglobin A1c 7 .5    He is doing outpatient therapy.  He still has mild right sided weakness.  He uses a walker for long distances.  He denies any vision changes or dizziness.    Past Medical History:   Diagnosis Date    Acid reflux     Anemia     Arthritis     \"Wrists, Knees, Ankles And Spine\"    Belching     \"All The Time\"    CAD (coronary artery disease)     Sees Dr. Tara Cotter    Chronic back pain     Diabetes mellitus (HCC) Dx 's    \"I Go To [facility] For Diabetic Care, I See [place] Toscano CNP\"    Difficult intubation     Diverticulitis Dx Early 's    Echocardiogram 2019    EF 55-60%, Grade 1 diastolic dysfunction, No signifiant valvular disease note. No pericardial effusion.    H/O echocardiogram 2016    The left ventricular size is normal. There is mild concentric LVH. Trace to mild MR     H/O exercise stress test 2016    Patient tolerated well without focal complaints. Heart rate response Appropriate. Normal findings.     H/O percutaneous left heart catheterization 2016    LAD 80%, Cx

## 2024-08-28 ENCOUNTER — TELEPHONE (OUTPATIENT)
Age: 79
End: 2024-08-28

## 2024-08-28 ENCOUNTER — OFFICE VISIT (OUTPATIENT)
Age: 79
End: 2024-08-28
Payer: MEDICARE

## 2024-08-28 VITALS
OXYGEN SATURATION: 95 % | DIASTOLIC BLOOD PRESSURE: 74 MMHG | SYSTOLIC BLOOD PRESSURE: 122 MMHG | WEIGHT: 204.5 LBS | HEART RATE: 80 BPM | BODY MASS INDEX: 28.63 KG/M2 | RESPIRATION RATE: 14 BRPM

## 2024-08-28 DIAGNOSIS — R06.83 SNORING: ICD-10-CM

## 2024-08-28 DIAGNOSIS — I63.9 ARTERIAL ISCHEMIC STROKE (HCC): Primary | ICD-10-CM

## 2024-08-28 PROCEDURE — 99204 OFFICE O/P NEW MOD 45 MIN: CPT | Performed by: NURSE PRACTITIONER

## 2024-08-28 PROCEDURE — G8417 CALC BMI ABV UP PARAM F/U: HCPCS | Performed by: NURSE PRACTITIONER

## 2024-08-28 PROCEDURE — 3078F DIAST BP <80 MM HG: CPT | Performed by: NURSE PRACTITIONER

## 2024-08-28 PROCEDURE — 1036F TOBACCO NON-USER: CPT | Performed by: NURSE PRACTITIONER

## 2024-08-28 PROCEDURE — 3074F SYST BP LT 130 MM HG: CPT | Performed by: NURSE PRACTITIONER

## 2024-08-28 PROCEDURE — G8427 DOCREV CUR MEDS BY ELIG CLIN: HCPCS | Performed by: NURSE PRACTITIONER

## 2024-08-28 PROCEDURE — 1111F DSCHRG MED/CURRENT MED MERGE: CPT | Performed by: NURSE PRACTITIONER

## 2024-08-28 PROCEDURE — 1123F ACP DISCUSS/DSCN MKR DOCD: CPT | Performed by: NURSE PRACTITIONER

## 2024-08-28 NOTE — PATIENT INSTRUCTIONS
A referral has been placed to Sleep Clinic  [facility]  [address], [address]  [place] [address] [phone]    If you have not heard from anyone regarding scheduling within 2 business days please call the above number to schedule an appointment.

## 2024-09-10 ENCOUNTER — HOSPITAL ENCOUNTER (OUTPATIENT)
Dept: SLEEP CENTER | Age: 79
Discharge: HOME | End: 2024-09-10
Payer: MEDICARE

## 2024-09-10 VITALS
SYSTOLIC BLOOD PRESSURE: 135 MMHG | OXYGEN SATURATION: 99 % | HEIGHT: 71 IN | BODY MASS INDEX: 28.56 KG/M2 | HEART RATE: 77 BPM | DIASTOLIC BLOOD PRESSURE: 76 MMHG | WEIGHT: 204 LBS

## 2024-09-10 DIAGNOSIS — I63.9 ACUTE CVA (CEREBROVASCULAR ACCIDENT) (HCC): ICD-10-CM

## 2024-09-10 DIAGNOSIS — G47.33 OSA (OBSTRUCTIVE SLEEP APNEA): Primary | ICD-10-CM

## 2024-09-10 PROCEDURE — 99205 OFFICE O/P NEW HI 60 MIN: CPT | Performed by: STUDENT IN AN ORGANIZED HEALTH CARE EDUCATION/TRAINING PROGRAM

## 2024-09-10 PROCEDURE — 99211 OFF/OP EST MAY X REQ PHY/QHP: CPT

## 2024-09-10 ASSESSMENT — SLEEP AND FATIGUE QUESTIONNAIRES
HOW LIKELY ARE YOU TO NOD OFF OR FALL ASLEEP WHEN YOU ARE A PASSENGER IN A CAR FOR AN HOUR WITHOUT A BREAK: WOULD NEVER DOZE
HOW LIKELY ARE YOU TO NOD OFF OR FALL ASLEEP WHILE WATCHING TV: SLIGHT CHANCE OF DOZING
ESS TOTAL SCORE: 5
HOW LIKELY ARE YOU TO NOD OFF OR FALL ASLEEP WHILE SITTING INACTIVE IN A PUBLIC PLACE: WOULD NEVER DOZE
HOW LIKELY ARE YOU TO NOD OFF OR FALL ASLEEP WHILE SITTING QUIETLY AFTER LUNCH WITHOUT ALCOHOL: WOULD NEVER DOZE
HOW LIKELY ARE YOU TO NOD OFF OR FALL ASLEEP WHILE LYING DOWN TO REST IN THE AFTERNOON WHEN CIRCUMSTANCES PERMIT: HIGH CHANCE OF DOZING
HOW LIKELY ARE YOU TO NOD OFF OR FALL ASLEEP WHILE SITTING AND READING: SLIGHT CHANCE OF DOZING
HOW LIKELY ARE YOU TO NOD OFF OR FALL ASLEEP WHILE SITTING AND TALKING TO SOMEONE: WOULD NEVER DOZE
HOW LIKELY ARE YOU TO NOD OFF OR FALL ASLEEP IN A CAR, WHILE STOPPED FOR A FEW MINUTES IN TRAFFIC: WOULD NEVER DOZE

## 2024-10-13 NOTE — PROGRESS NOTES
CARDIOLOGY  NOTE    2024    Franko Odom (:  1945) is a 78 y.o. male,an established patient with Dr. Diaz, here for evaluation of the following chief complaint(s):  Follow-up (6mo FU)        SUBJECTIVE/OBJECTIVE:    HPI  Franko is here to follow up on his cardiovascular health.     He states that he is doing well. He states that he is not having any issues.    Franko has a history of ASCVD, Dyslipidemia, HTN, LINDA, and DM.    He is a former smoker. He denies any issues with obtaining taking or side effects from medications.      Review of Systems   Constitutional:  Negative for fatigue and fever.   Respiratory:  Negative for cough and shortness of breath.    Cardiovascular:  Negative for chest pain, palpitations and leg swelling.   Musculoskeletal:  Negative for arthralgias and gait problem.   Neurological:  Negative for dizziness, syncope, weakness, light-headedness and headaches.       Vitals:    24 1457   BP: 136/88   Site: Right Upper Arm   Position: Sitting   Cuff Size: Large Adult   Pulse: 87   SpO2: 98%   Weight: 97.6 kg (215 lb 2.2 oz)   Height: 1.803 m (5' 11\")       Wt Readings from Last 3 Encounters:   24 97.6 kg (215 lb 2.2 oz)   23 97.5 kg (215 lb)   23 95.3 kg (210 lb)       BP Readings from Last 3 Encounters:   24 136/88   23 114/70   23 124/80       Prior to Admission medications    Medication Sig Start Date End Date Taking? Authorizing Provider   rosuvastatin (CRESTOR) 20 MG tablet TAKE 1 TABLET BY MOUTH EVERY DAY IN THE MORNING 1/15/24  Yes Gena Moreno APRN - CNP   carvedilol (COREG) 6.25 MG tablet TAKE 1 TABLET BY MOUTH TWICE A DAY 24  Yes Gena Moreno APRN - CNP   ranolazine (RANEXA) 500 MG extended release tablet TAKE 1 TABLET BY MOUTH TWICE A DAY 23  Yes Janet Espinoza APRN - CNP   losartan (COZAAR) 50 MG tablet TAKE 1 TABLET BY MOUTH EVERY DAY 10/30/23  Yes Gena Moreno APRN - CNP   nitroGLYCERIN    Patient Seen in: [facility] Emergency Department      History     Chief Complaint   Patient presents with    Bite    Swelling     Stated Complaint: Bite    Subjective:   HPI      41-year-old female with history of asthma presents with complaints of right arm redness, swelling, and itching.  The patient states that she was stung by an insect to the right arm earlier today.  She reports over the past couple of hours she has had redness, itching, and swelling to her right arm where she was stung.  She denies any fevers.  She denies dyspnea.  No throat tightness or swelling.    Objective:     Past Medical History:    Asthma (HCC)              History reviewed. No pertinent surgical history.             Social History     Socioeconomic History    Marital status: Single   Tobacco Use    Smoking status: Never    Smokeless tobacco: Never   Vaping Use    Vaping status: Never Used   Substance and Sexual Activity    Alcohol use: Not Currently    Drug use: Never                  Physical Exam     ED Triage Vitals [10/12/24 2101]   /68   Pulse 69   Resp 20   Temp 97.9 °F (36.6 °C)   Temp src    SpO2 100 %   O2 Device        Current Vitals:   Vital Signs  BP: 114/68  Pulse: 69  Resp: 20  Temp: 97.9 °F (36.6 °C)    Oxygen Therapy  SpO2: 100 %        Physical Exam    General Appearance: alert, no distress  Eyes: pupils equal and round no pallor or injection  ENT, Mouth: mucous membranes moist  Respiratory: there are no retractions, lungs are clear to auscultation  Cardiovascular: regular rate and rhythm  Gastrointestinal:  abdomen is soft and non tender, no masses, bowel sounds normal  Neurological: Speech normal.  Motor and sensation is intact and symmetric to bilateral upper extremities.  Skin: Erythema, swelling, and warmth noted to a large area of the right arm covering the distal two thirds of the anterior aspect arm ending in the antecubital area.  No vesicles or pustules noted.  No desquamation.  No  tenderness.  Musculoskeletal: neck is supple non tender        Extremities are symmetrical, full range of motion  Psychiatric: patient is oriented X 3, there is no agitation    ED Course   Labs Reviewed - No data to display                MDM      Patient appears to have a localized reaction to an insect sting.  Will recommend Benadryl and a short course of steroids.  She is advised to return if the rash is increasing in size or if difficulty breathing, fever, or other new symptoms develop.        Medical Decision Making      Disposition and Plan     Clinical Impression:  1. Insect stings, accidental or unintentional, initial encounter         Disposition:  Discharge  10/12/2024  9:34 pm    Follow-up:  Mindy Marquez  [address] [facility] [address]  [phone]    Follow up            Medications Prescribed:  Current Discharge Medication List        START taking these medications    Details   predniSONE 20 MG Oral Tab Take 1 tablet (20 mg total) by mouth daily for 5 days.  Qty: 5 tablet, Refills: 0                 Supplementary Documentation:

## 2024-10-21 ENCOUNTER — HOSPITAL ENCOUNTER (OUTPATIENT)
Dept: SLEEP CENTER | Age: 79
Discharge: HOME | End: 2024-10-21
Payer: MEDICARE

## 2024-10-21 DIAGNOSIS — G47.33 OSA (OBSTRUCTIVE SLEEP APNEA): ICD-10-CM

## 2024-10-21 DIAGNOSIS — I63.9 ACUTE CVA (CEREBROVASCULAR ACCIDENT) (HCC): ICD-10-CM

## 2024-10-21 PROCEDURE — 95810 POLYSOM 6/> YRS 4/> PARAM: CPT

## 2024-10-22 VITALS — WEIGHT: 204 LBS | HEIGHT: 71 IN | BODY MASS INDEX: 28.56 KG/M2

## 2024-11-08 RX ORDER — CLOPIDOGREL BISULFATE 75 MG/1
75 TABLET ORAL DAILY
Qty: 90 TABLET | Refills: 1 | Status: SHIPPED | OUTPATIENT
Start: 2024-11-08

## 2024-11-19 ENCOUNTER — HOSPITAL ENCOUNTER (OUTPATIENT)
Dept: SLEEP CENTER | Age: 79
Discharge: HOME | End: 2024-11-19
Payer: MEDICARE

## 2024-11-19 DIAGNOSIS — G47.33 OSA (OBSTRUCTIVE SLEEP APNEA): Primary | ICD-10-CM

## 2024-11-19 PROCEDURE — 99211 OFF/OP EST MAY X REQ PHY/QHP: CPT

## 2024-11-19 PROCEDURE — 99215 OFFICE O/P EST HI 40 MIN: CPT | Performed by: STUDENT IN AN ORGANIZED HEALTH CARE EDUCATION/TRAINING PROGRAM

## 2024-11-19 NOTE — PROGRESS NOTES
History:   Procedure Laterality Date    CARDIAC CATHETERIZATION  09/02/2016    CARDIAC SURGERY  09/13/2016    CABG x 4 [place]ima to the LAD and D1, SVG to PDA, SVG to CX M1    COLONOSCOPY  Early 1970's    ENDOSCOPY, COLON, DIAGNOSTIC  Late 1980's    LEG SURGERY Right 2000's    Gangrene Right Lower Leg    OTHER SURGICAL HISTORY Bilateral Last Done In 1990's    \"Multiple Ear Surgeries To Patch Ear Drum\"    WRIST SURGERY Right 1998 Or 1999    \"Lump Removed Top Of Right Wrist, Pinched Ulnar Nerve Also Done\"       Family History   Problem Relation Age of Onset    Heart Disease Mother         Open Heart Surgery    Vision Loss Mother     Hearing Loss Mother     Cancer Father         Prostate Cancer    Heart Disease Father         Open Heart Surgery    Heart Disease Brother     Arthritis Brother     Diabetes Son     Other Son         \"2 Surgeries On Pancreas\"         Objective:     There were no vitals filed for this visit.    PSG 10/21/24 : AHI 34.7, Lowest O2 sat 71% and <88% for 18.7 min, 0.7 PLM-AI       Assessment:      Diagnosis:  Severe LINDA     Plan:      Prescribed APAP and supplies  Advised against sleeping on back until PAP in use  3. Will retrn to lab for compliance/efficacy review after device use 30+ days        --Lesly Stahl DO on 11/19/2024 at 10:54 AM      Orders Placed This Encounter   Procedures    DME Order for CPAP as OP          Electronically signed by Lesly Stahl DO on 11/19/2024 at 10:54 AM

## 2024-12-10 RX ORDER — RANOLAZINE 500 MG/1
500 TABLET, EXTENDED RELEASE ORAL 2 TIMES DAILY
Qty: 180 TABLET | Refills: 3 | Status: SHIPPED | OUTPATIENT
Start: 2024-12-10

## 2024-12-20 RX ORDER — CARVEDILOL 6.25 MG/1
TABLET ORAL
Qty: 180 TABLET | Refills: 3 | Status: SHIPPED | OUTPATIENT
Start: 2024-12-20

## 2025-01-06 RX ORDER — ROSUVASTATIN CALCIUM 20 MG/1
20 TABLET, COATED ORAL EVERY MORNING
Qty: 90 TABLET | Refills: 3 | Status: SHIPPED | OUTPATIENT
Start: 2025-01-06

## 2025-01-07 ENCOUNTER — APPOINTMENT (OUTPATIENT)
Dept: CT IMAGING | Age: 80
End: 2025-01-07
Payer: MEDICARE

## 2025-01-07 ENCOUNTER — HOSPITAL ENCOUNTER (EMERGENCY)
Age: 80
Discharge: HOME | End: 2025-01-07
Attending: EMERGENCY MEDICINE
Payer: MEDICARE

## 2025-01-07 VITALS
HEART RATE: 80 BPM | WEIGHT: 184 LBS | OXYGEN SATURATION: 99 % | RESPIRATION RATE: 18 BRPM | BODY MASS INDEX: 25.76 KG/M2 | DIASTOLIC BLOOD PRESSURE: 85 MMHG | SYSTOLIC BLOOD PRESSURE: 126 MMHG | HEIGHT: 71 IN

## 2025-01-07 DIAGNOSIS — R33.9 URINARY RETENTION: Primary | ICD-10-CM

## 2025-01-07 LAB
ANION GAP SERPL CALCULATED.3IONS-SCNC: 16 MMOL/L (ref 4–16)
BASOPHILS # BLD: 0.04 K/UL
BASOPHILS NFR BLD: 1 % (ref 0–1)
BILIRUB UR QL STRIP: NEGATIVE
BUN SERPL-MCNC: 31 MG/DL (ref 6–23)
CALCIUM SERPL-MCNC: 9.8 MG/DL (ref 8.3–10.6)
CHLORIDE SERPL-SCNC: 100 MMOL/L (ref 99–110)
CLARITY UR: CLEAR
CO2 SERPL-SCNC: 23 MMOL/L (ref 21–32)
COLOR UR: YELLOW
CREAT SERPL-MCNC: 1.8 MG/DL (ref 0.9–1.3)
EOSINOPHIL # BLD: 0.06 K/UL
EOSINOPHILS RELATIVE PERCENT: 1 % (ref 0–3)
ERYTHROCYTE [DISTWIDTH] IN BLOOD BY AUTOMATED COUNT: 13.2 % (ref 11.7–14.9)
GFR, ESTIMATED: 38 ML/MIN/1.73M2
GLUCOSE SERPL-MCNC: 131 MG/DL (ref 70–99)
GLUCOSE UR STRIP-MCNC: >=1000 MG/DL
HCT VFR BLD AUTO: 43.3 % (ref 42–52)
HGB BLD-MCNC: 14.6 G/DL (ref 13.5–18)
HGB UR QL STRIP.AUTO: ABNORMAL
IMM GRANULOCYTES # BLD AUTO: 0.05 K/UL
IMM GRANULOCYTES NFR BLD: 1 %
KETONES UR STRIP-MCNC: NEGATIVE MG/DL
LEUKOCYTE ESTERASE UR QL STRIP: NEGATIVE
LYMPHOCYTES NFR BLD: 1.35 K/UL
LYMPHOCYTES RELATIVE PERCENT: 18 % (ref 24–44)
MCH RBC QN AUTO: 31.7 PG (ref 27–31)
MCHC RBC AUTO-ENTMCNC: 33.7 G/DL (ref 32–36)
MCV RBC AUTO: 93.9 FL (ref 78–100)
MONOCYTES NFR BLD: 0.9 K/UL
MONOCYTES NFR BLD: 12 % (ref 0–4)
NEUTROPHILS NFR BLD: 68 % (ref 36–66)
NEUTS SEG NFR BLD: 5.07 K/UL
NITRITE UR QL STRIP: NEGATIVE
PH UR STRIP: 6 [PH] (ref 5–8)
PLATELET # BLD AUTO: 237 K/UL (ref 140–440)
PMV BLD AUTO: 9.8 FL (ref 7.5–11.1)
POTASSIUM SERPL-SCNC: 4.1 MMOL/L (ref 3.5–5.1)
PROT UR STRIP-MCNC: NEGATIVE MG/DL
RBC # BLD AUTO: 4.61 M/UL (ref 4.6–6.2)
RBC #/AREA URNS HPF: NORMAL /HPF
SODIUM SERPL-SCNC: 139 MMOL/L (ref 135–145)
SP GR UR STRIP: 1.01 (ref 1–1.03)
UROBILINOGEN UR STRIP-ACNC: 0.2 EU/DL (ref 0–1)
WBC #/AREA URNS HPF: NORMAL /HPF
WBC OTHER # BLD: 7.5 K/UL (ref 4–10.5)

## 2025-01-07 PROCEDURE — 80048 BASIC METABOLIC PNL TOTAL CA: CPT

## 2025-01-07 PROCEDURE — 74177 CT ABD & PELVIS W/CONTRAST: CPT

## 2025-01-07 PROCEDURE — 87086 URINE CULTURE/COLONY COUNT: CPT

## 2025-01-07 PROCEDURE — 96375 TX/PRO/DX INJ NEW DRUG ADDON: CPT

## 2025-01-07 PROCEDURE — 99285 EMERGENCY DEPT VISIT HI MDM: CPT

## 2025-01-07 PROCEDURE — 81001 URINALYSIS AUTO W/SCOPE: CPT

## 2025-01-07 PROCEDURE — 6360000002 HC RX W HCPCS: Performed by: EMERGENCY MEDICINE

## 2025-01-07 PROCEDURE — 85025 COMPLETE CBC W/AUTO DIFF WBC: CPT

## 2025-01-07 PROCEDURE — 6360000004 HC RX CONTRAST MEDICATION: Performed by: EMERGENCY MEDICINE

## 2025-01-07 PROCEDURE — 6370000000 HC RX 637 (ALT 250 FOR IP): Performed by: EMERGENCY MEDICINE

## 2025-01-07 PROCEDURE — 96374 THER/PROPH/DIAG INJ IV PUSH: CPT

## 2025-01-07 RX ORDER — TAMSULOSIN HYDROCHLORIDE 0.4 MG/1
0.4 CAPSULE ORAL DAILY
Qty: 30 CAPSULE | Refills: 0 | Status: SHIPPED | OUTPATIENT
Start: 2025-01-07

## 2025-01-07 RX ORDER — LIDOCAINE HYDROCHLORIDE 20 MG/ML
JELLY TOPICAL PRN
Status: DISCONTINUED | OUTPATIENT
Start: 2025-01-07 | End: 2025-01-07 | Stop reason: HOSPADM

## 2025-01-07 RX ORDER — MORPHINE SULFATE 4 MG/ML
4 INJECTION, SOLUTION INTRAMUSCULAR; INTRAVENOUS ONCE
Status: COMPLETED | OUTPATIENT
Start: 2025-01-07 | End: 2025-01-07

## 2025-01-07 RX ORDER — IOPAMIDOL 755 MG/ML
100 INJECTION, SOLUTION INTRAVASCULAR
Status: COMPLETED | OUTPATIENT
Start: 2025-01-07 | End: 2025-01-07

## 2025-01-07 RX ORDER — ONDANSETRON 2 MG/ML
4 INJECTION INTRAMUSCULAR; INTRAVENOUS ONCE
Status: COMPLETED | OUTPATIENT
Start: 2025-01-07 | End: 2025-01-07

## 2025-01-07 RX ORDER — TAMSULOSIN HYDROCHLORIDE 0.4 MG/1
0.4 CAPSULE ORAL DAILY
Status: DISCONTINUED | OUTPATIENT
Start: 2025-01-07 | End: 2025-01-07 | Stop reason: HOSPADM

## 2025-01-07 RX ORDER — TIRZEPATIDE 7.5 MG/.5ML
7.5 INJECTION, SOLUTION SUBCUTANEOUS WEEKLY
COMMUNITY
Start: 2024-12-06

## 2025-01-07 RX ADMIN — LIDOCAINE HYDROCHLORIDE: 20 JELLY TOPICAL at 14:32

## 2025-01-07 RX ADMIN — ONDANSETRON 4 MG: 2 INJECTION, SOLUTION INTRAMUSCULAR; INTRAVENOUS at 14:05

## 2025-01-07 RX ADMIN — MORPHINE SULFATE 4 MG: 4 INJECTION, SOLUTION INTRAMUSCULAR; INTRAVENOUS at 14:05

## 2025-01-07 RX ADMIN — IOPAMIDOL 100 ML: 755 INJECTION, SOLUTION INTRAVENOUS at 14:24

## 2025-01-07 RX ADMIN — TAMSULOSIN HYDROCHLORIDE 0.4 MG: 0.4 CAPSULE ORAL at 14:32

## 2025-01-07 ASSESSMENT — PAIN - FUNCTIONAL ASSESSMENT: PAIN_FUNCTIONAL_ASSESSMENT: 0-10

## 2025-01-07 ASSESSMENT — PAIN SCALES - GENERAL: PAINLEVEL_OUTOF10: 4

## 2025-01-07 NOTE — ED PROVIDER NOTES
Triage Chief Complaint:   Flank Pain (Bilat flank pain since Sat, seen at urgent care and started on abx, symptoms improved but still having some pain per pt ) and Urinary Frequency (Since Saturday )    [place]:  Franko Odom is a 79 y.o. male that presents to the ED with back pain and abdominal distention.  He states he was urgent care recently diagnosed with UTI he was having no dysuria for me.  He is never had a UTI in the past.  He was called had his urine culture be negative was recommended to follow-up patient presents with massively distended abdomen.  He denies of any known prostate issues        Past Medical History:   Diagnosis Date    Acid reflux     Anemia     Arthritis     \"Wrists, Knees, Ankles And Spine\"    Belching     \"All The Time\"    CAD (coronary artery disease)     Sees Dr. Tara Cotter    Chronic back pain     Diabetes mellitus (HCC) Dx 1990's    \"I Go To [facility] For Diabetic Care, I See Georgia Toscano CNP\"    Difficult intubation     Diverticulitis Dx Early 1970's    Echocardiogram 01/22/2019    EF 55-60%, Grade 1 diastolic dysfunction, No signifiant valvular disease note. No pericardial effusion.    H/O echocardiogram 07/25/2016    The left ventricular size is normal. There is mild concentric LVH. Trace to mild MR     H/O exercise stress test 07/25/2016    Patient tolerated well without focal complaints. Heart rate response Appropriate. Normal findings.     H/O percutaneous left heart catheterization 09/02/2016    LAD 80%, Cx 80%, RCA 50% prox, PDA 90% mid    Hyperlipidemia     Hypertension     Lexiscan stress test 01/22/2019    EF 71%, Moderate size medium inferior ischemia. 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    Lima to LAD & D1, SVG PDA & Cx M1    Shortness of breath on exertion     Wears glasses      Past Surgical History:   Procedure Laterality Date    CARDIAC CATHETERIZATION  09/02/2016    CARDIAC SURGERY   (PF) injection 4 mg (4 mg IntraVENous Given 1/7/25 1405)   ondansetron (ZOFRAN) injection 4 mg (4 mg IntraVENous Given 1/7/25 1405)   iopamidol (ISOVUE-370) 76 % injection 100 mL (100 mLs IntraVENous Given 1/7/25 1424)       Imaging Interpretation by CT a/p : urinary distension      Chronic conditions affecting care: noted    Discussion with Other Profesionals : None    Social Determinants : None    Records Reviewed : Source EPIC     Disposition Considerations: DC      Appropriate for outpatient management      I am the Primary Clinician of Record.           Clinical Impression:  1. Urinary retention      Disposition referral (if applicable):  Waleska Sigala MD  [address] [facility] [address] [phone]    Schedule an appointment as soon as possible for a visit in 1 week  7:45 am WED am !, If symptoms worsen    Disposition medications (if applicable):  Current Discharge Medication List        START taking these medications    Details   tamsulosin (FLOMAX) 0.4 MG capsule Take 1 capsule by mouth daily  Qty: 30 capsule, Refills: 0                 Bassam Vail DO, FACEP      Comment: Please note this report has been produced using speech recognition software and maycontain errors related to that system including errors in grammar, punctuation, and spelling, as well as words and phrases that may be inappropriate. If there are any questions or concerns please feel free to contact thedictating provider for clarification.        Bassam Vail DO  01/13/25 [id number]

## 2025-01-07 NOTE — DISCHARGE INSTRUCTIONS
Thank you for allowing us to be involved in your care today.  Follow up as discussed during your stay. This information is included in your discharge paperwork.  Appropriate followup is essential in your continued care after today's visit. If you had any diagnostic studies ( Labs or Xray's, CAT scan, Ultrasound ) have your PCP (Primary Care Physician) review them with you since there may be results that require further follow up or investigation.    Return to the Emergency Department at any point with worsening condition or concerns.      Please follow up with your family doctor or one of your choosing.  You [facility] a provider through [facility], Thank You for choosing [facility]        We are here 24 hours a day, 7 days a week, and are always here for you.      Dr. Bassam Vail  Board Certified  Clinical Professor  Emergency Medicine

## 2025-01-07 NOTE — ED NOTES
Standard cath bag switched to a leg bag. Mepilex applied to sacrum. Slightly reddened and small open area noted. Discussed wound care. Discussed bailey care and how to empty the leg bag.. Voiced understanding. Discharge instructions reviewed with patient. Reviewed medications with patient. No additional questions asked.  Voiced understanding. Encouraged patient to follow up as discussed by the ED physician. Reviewed how to access [place] at discharged with patient. Encourage to sign up either on their smartphone or on the computer to be able to review the information form today's and future visits. Voiced understanding. No additional questions asked. Patient ambulatory without difficulty. Physician aware.

## 2025-01-08 LAB
MICROORGANISM SPEC CULT: NORMAL
SERVICE CMNT-IMP: NORMAL
SPECIMEN DESCRIPTION: NORMAL

## 2025-01-11 ENCOUNTER — HOSPITAL ENCOUNTER (EMERGENCY)
Age: 80
Discharge: HOME | End: 2025-01-11
Attending: EMERGENCY MEDICINE
Payer: MEDICARE

## 2025-01-11 VITALS
OXYGEN SATURATION: 100 % | DIASTOLIC BLOOD PRESSURE: 76 MMHG | RESPIRATION RATE: 18 BRPM | HEART RATE: 90 BPM | BODY MASS INDEX: 27.38 KG/M2 | WEIGHT: 195.6 LBS | HEIGHT: 71 IN | SYSTOLIC BLOOD PRESSURE: 128 MMHG | TEMPERATURE: 97.5 F

## 2025-01-11 DIAGNOSIS — R33.8 ACUTE URINARY RETENTION: Primary | ICD-10-CM

## 2025-01-11 DIAGNOSIS — R31.0 GROSS HEMATURIA: ICD-10-CM

## 2025-01-11 DIAGNOSIS — E83.42 HYPOMAGNESEMIA: ICD-10-CM

## 2025-01-11 LAB
ALBUMIN SERPL-MCNC: 3.9 G/DL (ref 3.4–5)
ALBUMIN/GLOB SERPL: 1.1 {RATIO} (ref 1.1–2.2)
ALP SERPL-CCNC: 57 U/L (ref 40–129)
ALT SERPL-CCNC: 12 U/L (ref 10–40)
ANION GAP SERPL CALCULATED.3IONS-SCNC: 15 MMOL/L (ref 4–16)
AST SERPL-CCNC: 18 U/L (ref 15–37)
BASOPHILS # BLD: 0.04 K/UL
BASOPHILS NFR BLD: 1 % (ref 0–1)
BILIRUB SERPL-MCNC: 0.7 MG/DL (ref 0–1)
BILIRUB UR QL STRIP: NEGATIVE
BUN SERPL-MCNC: 20 MG/DL (ref 6–23)
CALCIUM SERPL-MCNC: 9.4 MG/DL (ref 8.3–10.6)
CHLORIDE SERPL-SCNC: 100 MMOL/L (ref 99–110)
CLARITY UR: CLEAR
CO2 SERPL-SCNC: 22 MMOL/L (ref 21–32)
COLOR UR: YELLOW
CREAT SERPL-MCNC: 1.3 MG/DL (ref 0.9–1.3)
EOSINOPHIL # BLD: 0.11 K/UL
EOSINOPHILS RELATIVE PERCENT: 1 % (ref 0–3)
EPI CELLS #/AREA URNS HPF: ABNORMAL /HPF
ERYTHROCYTE [DISTWIDTH] IN BLOOD BY AUTOMATED COUNT: 12.6 % (ref 11.7–14.9)
GFR, ESTIMATED: 56 ML/MIN/1.73M2
GLUCOSE SERPL-MCNC: 132 MG/DL (ref 70–99)
GLUCOSE UR STRIP-MCNC: >=1000 MG/DL
HCT VFR BLD AUTO: 41.8 % (ref 42–52)
HGB BLD-MCNC: 14.1 G/DL (ref 13.5–18)
HGB UR QL STRIP.AUTO: ABNORMAL
IMM GRANULOCYTES # BLD AUTO: 0.05 K/UL
IMM GRANULOCYTES NFR BLD: 1 %
KETONES UR STRIP-MCNC: NEGATIVE MG/DL
LEUKOCYTE ESTERASE UR QL STRIP: NEGATIVE
LYMPHOCYTES NFR BLD: 1.12 K/UL
LYMPHOCYTES RELATIVE PERCENT: 15 % (ref 24–44)
MAGNESIUM SERPL-MCNC: 1.6 MG/DL (ref 1.8–2.4)
MCH RBC QN AUTO: 32 PG (ref 27–31)
MCHC RBC AUTO-ENTMCNC: 33.7 G/DL (ref 32–36)
MCV RBC AUTO: 95 FL (ref 78–100)
MONOCYTES NFR BLD: 0.65 K/UL
MONOCYTES NFR BLD: 9 % (ref 0–4)
NEUTROPHILS NFR BLD: 74 % (ref 36–66)
NEUTS SEG NFR BLD: 5.64 K/UL
NITRITE UR QL STRIP: NEGATIVE
PH UR STRIP: 6 [PH] (ref 5–8)
PLATELET # BLD AUTO: 239 K/UL (ref 140–440)
PMV BLD AUTO: 9.4 FL (ref 7.5–11.1)
POTASSIUM SERPL-SCNC: 3.7 MMOL/L (ref 3.5–5.1)
PROT SERPL-MCNC: 7.6 G/DL (ref 6.4–8.2)
PROT UR STRIP-MCNC: NEGATIVE MG/DL
RBC # BLD AUTO: 4.4 M/UL (ref 4.6–6.2)
RBC #/AREA URNS HPF: ABNORMAL /HPF
SODIUM SERPL-SCNC: 137 MMOL/L (ref 135–145)
SP GR UR STRIP: 1.01 (ref 1–1.03)
UROBILINOGEN UR STRIP-ACNC: 0.2 EU/DL (ref 0–1)
WBC #/AREA URNS HPF: ABNORMAL /HPF
WBC OTHER # BLD: 7.6 K/UL (ref 4–10.5)

## 2025-01-11 PROCEDURE — 85025 COMPLETE CBC W/AUTO DIFF WBC: CPT

## 2025-01-11 PROCEDURE — 2580000003 HC RX 258: Performed by: EMERGENCY MEDICINE

## 2025-01-11 PROCEDURE — 51798 US URINE CAPACITY MEASURE: CPT

## 2025-01-11 PROCEDURE — 87086 URINE CULTURE/COLONY COUNT: CPT

## 2025-01-11 PROCEDURE — 6370000000 HC RX 637 (ALT 250 FOR IP): Performed by: EMERGENCY MEDICINE

## 2025-01-11 PROCEDURE — 99284 EMERGENCY DEPT VISIT MOD MDM: CPT

## 2025-01-11 PROCEDURE — 87077 CULTURE AEROBIC IDENTIFY: CPT

## 2025-01-11 PROCEDURE — 81001 URINALYSIS AUTO W/SCOPE: CPT

## 2025-01-11 PROCEDURE — 83735 ASSAY OF MAGNESIUM: CPT

## 2025-01-11 PROCEDURE — 80053 COMPREHEN METABOLIC PANEL: CPT

## 2025-01-11 PROCEDURE — 87186 SC STD MICRODIL/AGAR DIL: CPT

## 2025-01-11 PROCEDURE — 51702 INSERT TEMP BLADDER CATH: CPT

## 2025-01-11 RX ORDER — 0.9 % SODIUM CHLORIDE 0.9 %
1000 INTRAVENOUS SOLUTION INTRAVENOUS ONCE
Status: COMPLETED | OUTPATIENT
Start: 2025-01-11 | End: 2025-01-11

## 2025-01-11 RX ORDER — LIDOCAINE HYDROCHLORIDE 20 MG/ML
JELLY TOPICAL PRN
Status: DISCONTINUED | OUTPATIENT
Start: 2025-01-11 | End: 2025-01-11 | Stop reason: HOSPADM

## 2025-01-11 RX ORDER — LANOLIN ALCOHOL/MO/W.PET/CERES
400 CREAM (GRAM) TOPICAL ONCE
Status: COMPLETED | OUTPATIENT
Start: 2025-01-11 | End: 2025-01-11

## 2025-01-11 RX ADMIN — Medication 400 MG: at 14:20

## 2025-01-11 RX ADMIN — SODIUM CHLORIDE 1000 ML: 9 INJECTION, SOLUTION INTRAVENOUS at 13:30

## 2025-01-11 ASSESSMENT — PAIN DESCRIPTION - FREQUENCY: FREQUENCY: CONTINUOUS

## 2025-01-11 ASSESSMENT — PAIN SCALES - GENERAL: PAINLEVEL_OUTOF10: 4

## 2025-01-11 ASSESSMENT — PAIN - FUNCTIONAL ASSESSMENT
PAIN_FUNCTIONAL_ASSESSMENT: 0-10
PAIN_FUNCTIONAL_ASSESSMENT: PREVENTS OR INTERFERES SOME ACTIVE ACTIVITIES AND ADLS

## 2025-01-11 ASSESSMENT — PAIN DESCRIPTION - LOCATION: LOCATION: ABDOMEN

## 2025-01-11 ASSESSMENT — PAIN DESCRIPTION - PAIN TYPE: TYPE: ACUTE PAIN

## 2025-01-11 ASSESSMENT — PAIN DESCRIPTION - ONSET: ONSET: SUDDEN

## 2025-01-11 ASSESSMENT — PAIN DESCRIPTION - DESCRIPTORS: DESCRIPTORS: DISCOMFORT

## 2025-01-11 NOTE — ED PROVIDER NOTES
Emergency Department Encounter  Location: [facility] EMERGENCY DEPARTMENT    Patient: Franko Odom  MRN: [id number]  : 1945  Date of evaluation: 2025  ED Provider: Zuleyma Carlos DO    Chief Complaint:    Urinary Retention (Was here Thursday and had catheter placed. Saw Dr Sigala Thursday and had it removed. Has not been urinating much and had incontinence through the night lastnight.)    [place]:  Franko Odom is a 79 y.o. male that presents to the emergency department with comes in for urinary retention.  Patient was seen here Tuesday evening.  At that time, his wife states he drained nearly 4 L of urine out of his bladder and discharged him with Flomax and a Lindsey catheter.  Patient followed up with Dr. Sigala 2 days later.  His office remove the catheter.  Patient was initially able to void.  However over the past 24 hours he has had significantly decreased output and is feeling pressure in his lower abdomen.  Describes urinary incontinence overnight.  No fever or chills.  Has had some appetite and able to eat without vomiting.      Past Medical History:   Diagnosis Date    Acid reflux     Anemia     Arthritis     \"Wrists, Knees, Ankles And Spine\"    Belching     \"All The Time\"    CAD (coronary artery disease)     Sees Dr. Tara Cotter    Chronic back pain     Diabetes mellitus (HCC) Dx 's    \"I Go To [facility] For Diabetic Care, I See Georgia Toscano CNP\"    Difficult intubation     Diverticulitis Dx Early 's    Echocardiogram 2019    EF 55-60%, Grade 1 diastolic dysfunction, No signifiant valvular disease note. No pericardial effusion.    H/O echocardiogram 2016    The left ventricular size is normal. There is mild concentric LVH. Trace to mild MR     H/O exercise stress test 2016    Patient tolerated well without focal complaints. Heart rate response Appropriate. Normal findings.     H/O percutaneous left heart catheterization 2016    LAD 80%, Cx 80%,

## 2025-01-12 LAB
MICROORGANISM SPEC CULT: ABNORMAL
SERVICE CMNT-IMP: ABNORMAL
SPECIMEN DESCRIPTION: ABNORMAL

## 2025-01-13 LAB
MICROORGANISM SPEC CULT: ABNORMAL
SERVICE CMNT-IMP: ABNORMAL
SPECIMEN DESCRIPTION: ABNORMAL

## 2025-01-14 ENCOUNTER — HOSPITAL ENCOUNTER (OUTPATIENT)
Dept: SLEEP CENTER | Age: 80
Discharge: HOME | End: 2025-01-14
Payer: MEDICARE

## 2025-01-14 PROCEDURE — 99214 OFFICE O/P EST MOD 30 MIN: CPT | Performed by: STUDENT IN AN ORGANIZED HEALTH CARE EDUCATION/TRAINING PROGRAM

## 2025-01-14 PROCEDURE — 99211 OFF/OP EST MAY X REQ PHY/QHP: CPT

## 2025-01-14 NOTE — PROGRESS NOTES
Month Or More\"    Drug use: No    Sexual activity: Not Currently   Other Topics Concern    Not on file   Social History Narrative    Not on file     Social Determinants of Health     Financial Resource Strain: Not on file   Food Insecurity: No Food Insecurity (7/30/2024)    Hunger Vital Sign     Worried About Running Out of Food in the Last Year: Never true     Ran Out of Food in the Last Year: Never true   Transportation Needs: No Transportation Needs (7/30/2024)    PRAPARE - Transportation     Lack of Transportation (Medical): No     Lack of Transportation (Non-Medical): No   Physical Activity: Not on file   Stress: Not on file   Social Connections: Not on file   Intimate Partner Violence: Not on file   Housing Stability: Low Risk  (7/30/2024)    Housing Stability Vital Sign     Unable to Pay for Housing in the Last Year: No     Number of Places Lived in the Last Year: 1     Unstable Housing in the Last Year: No       Prior to Admission medications    Medication Sig Start Date End Date Taking? Authorizing Provider   MOUNJARO 7.5 MG/0.5ML SOAJ Inject 7.5 mg into the skin once a week 12/6/24   Provider, MD Emily   tamsulosin (FLOMAX) 0.4 MG capsule Take 1 capsule by mouth daily 1/7/25   Bassam Vail DO   rosuvastatin (CRESTOR) 20 MG tablet TAKE 1 TABLET BY MOUTH EVERY DAY IN THE MORNING 1/6/25   Geovani Arnold MD   carvedilol (COREG) 6.25 MG tablet TAKE 1 TABLET BY MOUTH TWICE A DAY 12/20/24   Gena Moreno APRN - CNP   ranolazine (RANEXA) 500 MG extended release tablet TAKE 1 TABLET BY MOUTH TWICE A DAY 12/10/24   Gena Moreno APRN - CNP   clopidogrel (PLAVIX) 75 MG tablet Take 1 tablet by mouth daily 11/8/24   Gena Moreno APRN - CNP   folic acid (FOLVITE) 1 MG tablet Take 1 tablet by mouth daily 8/12/24   Linda Cullen MD   vitamin B-1 (THIAMINE) 100 MG tablet Take 1 tablet by mouth daily 8/12/24   Linda Cullen MD   losartan (COZAAR) 25 MG tablet Take 1 tablet by mouth daily

## 2025-01-16 ENCOUNTER — OFFICE VISIT (OUTPATIENT)
Dept: CARDIOLOGY CLINIC | Age: 80
End: 2025-01-16

## 2025-01-16 VITALS
WEIGHT: 190.2 LBS | SYSTOLIC BLOOD PRESSURE: 98 MMHG | DIASTOLIC BLOOD PRESSURE: 62 MMHG | BODY MASS INDEX: 26.63 KG/M2 | HEIGHT: 71 IN | HEART RATE: 68 BPM

## 2025-01-16 DIAGNOSIS — I63.9 ACUTE ISCHEMIC STROKE (HCC): ICD-10-CM

## 2025-01-16 DIAGNOSIS — E78.5 DYSLIPIDEMIA: ICD-10-CM

## 2025-01-16 DIAGNOSIS — E11.59 TYPE 2 DIABETES MELLITUS WITH OTHER CIRCULATORY COMPLICATION, UNSPECIFIED WHETHER LONG TERM INSULIN USE (HCC): ICD-10-CM

## 2025-01-16 DIAGNOSIS — I10 ESSENTIAL HYPERTENSION: ICD-10-CM

## 2025-01-16 DIAGNOSIS — R55 SYNCOPE AND COLLAPSE: ICD-10-CM

## 2025-01-16 DIAGNOSIS — I63.9 ACUTE CVA (CEREBROVASCULAR ACCIDENT) (HCC): Primary | ICD-10-CM

## 2025-01-16 DIAGNOSIS — Z95.1 HISTORY OF CORONARY ARTERY BYPASS GRAFT: ICD-10-CM

## 2025-01-16 DIAGNOSIS — G47.33 OSA (OBSTRUCTIVE SLEEP APNEA): ICD-10-CM

## 2025-01-16 DIAGNOSIS — I25.810 CORONARY ATHEROSCLEROSIS OF AUTOLOGOUS ARTERY BYPASS GRAFT WITHOUT ANGINA: ICD-10-CM

## 2025-01-16 DIAGNOSIS — I63.9 STROKE DETERMINED BY CLINICAL ASSESSMENT (HCC): ICD-10-CM

## 2025-01-16 DIAGNOSIS — I63.9 CEREBROVASCULAR ACCIDENT (CVA), UNSPECIFIED MECHANISM (HCC): ICD-10-CM

## 2025-01-16 RX ORDER — FOLIC ACID 1 MG/1
1 TABLET ORAL DAILY
Qty: 90 TABLET | Refills: 3 | Status: SHIPPED | OUTPATIENT
Start: 2025-01-16

## 2025-01-16 RX ORDER — LOSARTAN POTASSIUM 25 MG/1
25 TABLET ORAL DAILY
Qty: 90 TABLET | Refills: 3 | Status: SHIPPED | OUTPATIENT
Start: 2025-01-16

## 2025-01-16 RX ORDER — CLOPIDOGREL BISULFATE 75 MG/1
75 TABLET ORAL DAILY
Qty: 90 TABLET | Refills: 3 | Status: SHIPPED | OUTPATIENT
Start: 2025-01-16

## 2025-01-16 RX ORDER — CARVEDILOL 6.25 MG/1
TABLET ORAL
Qty: 180 TABLET | Refills: 3 | Status: SHIPPED | OUTPATIENT
Start: 2025-01-16

## 2025-01-16 NOTE — PROGRESS NOTES
mellitus with other circulatory complication, unspecified whether long term insulin use ([facility])    2. Stroke determined by clinical assessment ([facility])    3. LINDA (obstructive sleep apnea)    4. History of coronary artery bypass graft    5. Essential hypertension    6. Dyslipidemia    7. 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 Acute ischemic stroke ([facility])    9. Acute CVA (cerebrovascular accident) ([facility])           No problem-specific Assessment & Plan notes found for this encounter.  S/P CABG x 4  CABG X 4 SVG in Sept 2016  to  marginal,LIMA to lad and D1,SVG to PDA,SVG to Cx M1 He  has abnormal inferior ischemia but currently angina class II stable on  imdur 30 mg, Coreg 6.25 mg bid and Ranexa.500 mg bid  , if he has more symptoms we will plan cardiac cath .   I have asked him to call me if his symptoms .  He has not needed to used nitro    H/o CVA  Got TNK and had MRI showing acute infarct in the right middle cerebellar peduncle  CTA head and neck showed carotid and vertebral was normal   Will place 30 day monitor    Essential hypertension  Bp at home is well controlled.He does have sleep  Apnea  But he does not want to wear cpap  On creg 6.25 mg bid   On losartan 50 mgh daily for renal protection      Type 2 diabetes mellitus with circulatory disorder ([facility])   Log shows fasting sugars in 140's last Hba 1c was 7.3 in 2019.Goes to [facility] endocrinology, continue  jardinace 10 mg daily  for cardiovascular risk factor reduction  He has started mounjaro     Sleep apnea  Using cpap at night       Dyslipidemia :  He had lab work recently,  Lipid profile was reviewed with patient.He will have repeat lab work with dr Meyer in few weeks. Advised to bring me the results he got [facility]   His LDL is in 68 s will increase Crestor to 20 mg daily if he tolerates it     No follow-ups on file.

## 2025-01-27 ENCOUNTER — HOSPITAL ENCOUNTER (EMERGENCY)
Age: 80
Discharge: HOME | End: 2025-01-27
Attending: EMERGENCY MEDICINE
Payer: MEDICARE

## 2025-01-27 VITALS
HEART RATE: 99 BPM | WEIGHT: 191 LBS | RESPIRATION RATE: 18 BRPM | BODY MASS INDEX: 26.74 KG/M2 | DIASTOLIC BLOOD PRESSURE: 75 MMHG | SYSTOLIC BLOOD PRESSURE: 133 MMHG | OXYGEN SATURATION: 95 % | HEIGHT: 71 IN | TEMPERATURE: 98.8 F

## 2025-01-27 DIAGNOSIS — R33.9 URINARY RETENTION: Primary | ICD-10-CM

## 2025-01-27 PROCEDURE — 99283 EMERGENCY DEPT VISIT LOW MDM: CPT

## 2025-01-27 PROCEDURE — 51702 INSERT TEMP BLADDER CATH: CPT

## 2025-01-27 ASSESSMENT — PAIN DESCRIPTION - ORIENTATION: ORIENTATION: LOWER

## 2025-01-27 ASSESSMENT — ENCOUNTER SYMPTOMS
EYES NEGATIVE: 1
NAUSEA: 0
GASTROINTESTINAL NEGATIVE: 1
RESPIRATORY NEGATIVE: 1

## 2025-01-27 ASSESSMENT — PAIN SCALES - GENERAL: PAINLEVEL_OUTOF10: 4

## 2025-01-27 ASSESSMENT — PAIN DESCRIPTION - DESCRIPTORS: DESCRIPTORS: DISCOMFORT

## 2025-01-27 ASSESSMENT — PAIN - FUNCTIONAL ASSESSMENT
PAIN_FUNCTIONAL_ASSESSMENT: PREVENTS OR INTERFERES SOME ACTIVE ACTIVITIES AND ADLS
PAIN_FUNCTIONAL_ASSESSMENT: 0-10

## 2025-01-27 ASSESSMENT — PAIN DESCRIPTION - PAIN TYPE: TYPE: ACUTE PAIN

## 2025-01-27 ASSESSMENT — PAIN DESCRIPTION - FREQUENCY: FREQUENCY: CONTINUOUS

## 2025-01-27 ASSESSMENT — PAIN DESCRIPTION - ONSET: ONSET: SUDDEN

## 2025-01-27 ASSESSMENT — PAIN DESCRIPTION - LOCATION: LOCATION: ABDOMEN

## 2025-01-28 NOTE — ED PROVIDER NOTES
The history is provided by the patient and the spouse.   Male  Problem  Presenting symptoms comment:  Patient reports emergency department with urinary retention.  The patient was just seen at the urologist office where they removed a Lindsey catheter they did a bladder scope did not find anything wrong patient was then sent home.  The patient states that he did not urinate prior to discharge from the office.  The patient states that he has had small dribbles throughout the day but then had complete urinary retention prior to coming to the emergency department.  The patient has not had any flank pain or discomfort.  He has not had any urinary continence he states that he just cannot pee although he feels like that he has to pee.  Patient also denies any penile swelling or penile redness.  Associated symptoms: no fever, no nausea and no scrotal swelling        Review of Systems   Constitutional: Negative.  Negative for fever.   HENT: Negative.     Eyes: Negative.    Respiratory: Negative.     Cardiovascular: Negative.    Gastrointestinal: Negative.  Negative for nausea.   Genitourinary: Negative.  Negative for scrotal swelling.   Musculoskeletal: Negative.    Skin: Negative.    Neurological: Negative.    All other systems reviewed and are negative.      Family History   Problem Relation Age of Onset    Heart Disease Mother         Open Heart Surgery    Vision Loss Mother     Hearing Loss Mother     Cancer Father         Prostate Cancer    Heart Disease Father         Open Heart Surgery    Heart Disease Brother     Arthritis Brother     Diabetes Son     Other Son         \"2 Surgeries On Pancreas\"     Social History     Socioeconomic History    Marital status:      Spouse name: Not on file    Number of children: Not on file    Years of education: Not on file    Highest education level: Not on file   Occupational History    Not on file   Tobacco Use    Smoking status: Former     Current packs/day: 0.00

## 2025-02-19 ENCOUNTER — TELEPHONE (OUTPATIENT)
Dept: CARDIOLOGY CLINIC | Age: 80
End: 2025-02-19

## 2025-02-19 NOTE — TELEPHONE ENCOUNTER
Spoke with patient regarding his monitor results. Monitor worn from 16 January to 14 February 2025 average heart rate was 83 bpm patient is primarily in sinus rhythm episode of sinus tachycardia with no sustained arrhythmia.  Patient has appt tomorrow woth Dr. Diaz.

## 2025-02-20 ENCOUNTER — OFFICE VISIT (OUTPATIENT)
Dept: CARDIOLOGY CLINIC | Age: 80
End: 2025-02-20
Payer: MEDICARE

## 2025-02-20 VITALS
HEIGHT: 71 IN | WEIGHT: 188 LBS | DIASTOLIC BLOOD PRESSURE: 80 MMHG | HEART RATE: 78 BPM | BODY MASS INDEX: 26.32 KG/M2 | SYSTOLIC BLOOD PRESSURE: 118 MMHG

## 2025-02-20 DIAGNOSIS — E11.59 TYPE 2 DIABETES MELLITUS WITH OTHER CIRCULATORY COMPLICATION, UNSPECIFIED WHETHER LONG TERM INSULIN USE (HCC): ICD-10-CM

## 2025-02-20 DIAGNOSIS — I25.810 CORONARY ATHEROSCLEROSIS OF AUTOLOGOUS ARTERY BYPASS GRAFT WITHOUT ANGINA: ICD-10-CM

## 2025-02-20 DIAGNOSIS — G47.33 OSA (OBSTRUCTIVE SLEEP APNEA): ICD-10-CM

## 2025-02-20 DIAGNOSIS — Z86.73 H/O: CVA (CEREBROVASCULAR ACCIDENT): ICD-10-CM

## 2025-02-20 DIAGNOSIS — I10 ESSENTIAL HYPERTENSION: ICD-10-CM

## 2025-02-20 DIAGNOSIS — Z95.1 HISTORY OF CORONARY ARTERY BYPASS GRAFT: ICD-10-CM

## 2025-02-20 DIAGNOSIS — I63.9 ACUTE ISCHEMIC STROKE (HCC): Primary | ICD-10-CM

## 2025-02-20 DIAGNOSIS — E78.5 DYSLIPIDEMIA: ICD-10-CM

## 2025-02-20 DIAGNOSIS — I63.9 ACUTE CVA (CEREBROVASCULAR ACCIDENT) (HCC): ICD-10-CM

## 2025-02-20 PROCEDURE — 3079F DIAST BP 80-89 MM HG: CPT | Performed by: INTERNAL MEDICINE

## 2025-02-20 PROCEDURE — 1036F TOBACCO NON-USER: CPT | Performed by: INTERNAL MEDICINE

## 2025-02-20 PROCEDURE — 99214 OFFICE O/P EST MOD 30 MIN: CPT | Performed by: INTERNAL MEDICINE

## 2025-02-20 PROCEDURE — 1123F ACP DISCUSS/DSCN MKR DOCD: CPT | Performed by: INTERNAL MEDICINE

## 2025-02-20 PROCEDURE — 3074F SYST BP LT 130 MM HG: CPT | Performed by: INTERNAL MEDICINE

## 2025-02-20 PROCEDURE — G8427 DOCREV CUR MEDS BY ELIG CLIN: HCPCS | Performed by: INTERNAL MEDICINE

## 2025-02-20 PROCEDURE — G8417 CALC BMI ABV UP PARAM F/U: HCPCS | Performed by: INTERNAL MEDICINE

## 2025-02-20 PROCEDURE — 1159F MED LIST DOCD IN RCRD: CPT | Performed by: INTERNAL MEDICINE

## 2025-02-20 NOTE — PROGRESS NOTES
pain, appetite loss, blood in stools, constipation, diarrhea or heartburn  Genitourinary: No dysuria, trouble voiding, or hematuria  Musculoskeletal:  None  Integumentary: No rash or pruritis  Neurological: No TIA or stroke symptoms  Psychiatric: No anxiety or depression  Endocrine: No malaise, fatigue or temperature intolerance  Hematologic/Lymphatic: No bleeding problems, blood clots or swollen lymph nodes  Allergic/Immunologic: No nasal congestion or hives    Objective:      Physical Exam:  /80 (Site: Left Upper Arm, Position: Sitting, Cuff Size: Medium Adult)   Pulse 78   Ht 1.803 m (5' 11\")   Wt 85.3 kg (188 lb)   BMI 26.22 kg/m²   Wt Readings from Last 3 Encounters:   02/20/25 85.3 kg (188 lb)   01/27/25 86.6 kg (191 lb)   01/16/25 86.3 kg (190 lb 3.2 oz)     Body mass index is 26.22 kg/m².    GENERAL - Alert, oriented, pleasant, in no apparent distress. Head unremarkable  Eyes -  Not injected conjunctiva  ENT - Normal mucosa  Neck - Supple. No jugular venous distention noted. No carotid bruits.   Cardiovascular - Normal S1 and S2 without obvious murmur or gallop. No signs of volume over load. Rate and rhythm is regular, surgical scar healing well. Sternal wound is healed. Stable   Extremities - No cyanosis, clubbing, or significant edema.    Pulmonary - No respiratory distress.  No wheezes or rales.    Pulses - Bilateral radial and pedal pulses normal  Abdomen - No tenderness  Musculoskeletal - Normal strength  Neurologic - There are no gross focal neurologic abnormalities.  Skin - No rash  Affect - Normal mood      Lab Results   Component Value Date    LABA1C 7.5 (H) 07/28/2024    LABA1C 7.0 12/02/2022     Lab Results   Component Value Date    CHOL 140 07/28/2024    TRIG 72 07/28/2024    HDL 40 (L) 07/28/2024    LDLDIRECT 67 09/03/2021     Lab Results   Component Value Date    ALT 12 01/11/2025    AST 18 01/11/2025     Cath 9/2/16  Coronary anatomy:   The left main coronary artery has no

## 2025-03-28 ENCOUNTER — HOSPITAL ENCOUNTER (EMERGENCY)
Age: 80
Discharge: HOME | End: 2025-03-28
Attending: EMERGENCY MEDICINE
Payer: MEDICARE

## 2025-03-28 ENCOUNTER — HOSPITAL ENCOUNTER (EMERGENCY)
Age: 80
Discharge: HOME | End: 2025-03-28
Attending: STUDENT IN AN ORGANIZED HEALTH CARE EDUCATION/TRAINING PROGRAM

## 2025-03-28 VITALS
TEMPERATURE: 98.1 F | HEART RATE: 88 BPM | WEIGHT: 190 LBS | SYSTOLIC BLOOD PRESSURE: 114 MMHG | RESPIRATION RATE: 18 BRPM | BODY MASS INDEX: 26.6 KG/M2 | DIASTOLIC BLOOD PRESSURE: 69 MMHG | HEIGHT: 71 IN | OXYGEN SATURATION: 97 %

## 2025-03-28 VITALS
DIASTOLIC BLOOD PRESSURE: 80 MMHG | RESPIRATION RATE: 18 BRPM | TEMPERATURE: 97.7 F | WEIGHT: 182.2 LBS | BODY MASS INDEX: 25.51 KG/M2 | HEIGHT: 71 IN | HEART RATE: 85 BPM | OXYGEN SATURATION: 98 % | SYSTOLIC BLOOD PRESSURE: 123 MMHG

## 2025-03-28 DIAGNOSIS — R33.9 URINARY RETENTION: Primary | ICD-10-CM

## 2025-03-28 DIAGNOSIS — R31.0 MACROSCOPIC HEMATURIA: ICD-10-CM

## 2025-03-28 DIAGNOSIS — T83.511A URINARY TRACT INFECTION ASSOCIATED WITH INDWELLING URETHRAL CATHETER, INITIAL ENCOUNTER: ICD-10-CM

## 2025-03-28 DIAGNOSIS — R31.0 GROSS HEMATURIA: Primary | ICD-10-CM

## 2025-03-28 DIAGNOSIS — N39.0 URINARY TRACT INFECTION ASSOCIATED WITH INDWELLING URETHRAL CATHETER, INITIAL ENCOUNTER: ICD-10-CM

## 2025-03-28 LAB
ANION GAP SERPL CALCULATED.3IONS-SCNC: 20 MMOL/L (ref 9–17)
BACTERIA URNS QL MICRO: ABNORMAL
BASOPHILS # BLD: 0.04 K/UL
BASOPHILS NFR BLD: 0 % (ref 0–1)
BILIRUB UR QL STRIP: NEGATIVE
BUN SERPL-MCNC: 21 MG/DL (ref 7–20)
CALCIUM SERPL-MCNC: 9.5 MG/DL (ref 8.3–10.6)
CHLORIDE SERPL-SCNC: 99 MMOL/L (ref 99–110)
CLARITY UR: ABNORMAL
CO2 SERPL-SCNC: 16 MMOL/L (ref 21–32)
COLOR UR: ABNORMAL
CREAT SERPL-MCNC: 1.3 MG/DL (ref 0.8–1.3)
EOSINOPHIL # BLD: 0.01 K/UL
EOSINOPHILS RELATIVE PERCENT: 0 % (ref 0–3)
EPI CELLS #/AREA URNS HPF: ABNORMAL /HPF
ERYTHROCYTE [DISTWIDTH] IN BLOOD BY AUTOMATED COUNT: 12.7 % (ref 11.7–14.9)
GFR, ESTIMATED: 56 ML/MIN/1.73M2
GLUCOSE SERPL-MCNC: 153 MG/DL (ref 74–99)
GLUCOSE UR STRIP-MCNC: 100 MG/DL
HCT VFR BLD AUTO: 36.7 % (ref 42–52)
HGB BLD-MCNC: 12.6 G/DL (ref 13.5–18)
HGB UR QL STRIP.AUTO: ABNORMAL
IMM GRANULOCYTES # BLD AUTO: 0.08 K/UL
IMM GRANULOCYTES NFR BLD: 1 %
KETONES UR STRIP-MCNC: ABNORMAL MG/DL
LEUKOCYTE ESTERASE UR QL STRIP: ABNORMAL
LYMPHOCYTES NFR BLD: 1.28 K/UL
LYMPHOCYTES RELATIVE PERCENT: 12 % (ref 24–44)
MCH RBC QN AUTO: 31.7 PG (ref 27–31)
MCHC RBC AUTO-ENTMCNC: 34.3 G/DL (ref 32–36)
MCV RBC AUTO: 92.4 FL (ref 78–100)
MONOCYTES NFR BLD: 1.02 K/UL
MONOCYTES NFR BLD: 9 % (ref 0–4)
NEUTROPHILS NFR BLD: 78 % (ref 36–66)
NEUTS SEG NFR BLD: 8.62 K/UL
NITRITE UR QL STRIP: POSITIVE
PH UR STRIP: 7 [PH] (ref 5–8)
PLATELET # BLD AUTO: 178 K/UL (ref 140–440)
PMV BLD AUTO: 9.7 FL (ref 7.5–11.1)
POTASSIUM SERPL-SCNC: 3.6 MMOL/L (ref 3.5–5.1)
PROT UR STRIP-MCNC: >=300 MG/DL
RBC # BLD AUTO: 3.97 M/UL (ref 4.6–6.2)
RBC #/AREA URNS HPF: ABNORMAL /HPF
SODIUM SERPL-SCNC: 134 MMOL/L (ref 136–145)
SP GR UR STRIP: 1.01 (ref 1–1.03)
UROBILINOGEN UR STRIP-ACNC: 2 EU/DL (ref 0.2–1)
WBC #/AREA URNS HPF: ABNORMAL /HPF
WBC OTHER # BLD: 11.1 K/UL (ref 4–10.5)

## 2025-03-28 PROCEDURE — 6360000002 HC RX W HCPCS: Performed by: STUDENT IN AN ORGANIZED HEALTH CARE EDUCATION/TRAINING PROGRAM

## 2025-03-28 PROCEDURE — 51702 INSERT TEMP BLADDER CATH: CPT

## 2025-03-28 PROCEDURE — 87186 SC STD MICRODIL/AGAR DIL: CPT

## 2025-03-28 PROCEDURE — 81001 URINALYSIS AUTO W/SCOPE: CPT

## 2025-03-28 PROCEDURE — 80048 BASIC METABOLIC PNL TOTAL CA: CPT

## 2025-03-28 PROCEDURE — 99284 EMERGENCY DEPT VISIT MOD MDM: CPT

## 2025-03-28 PROCEDURE — 2500000003 HC RX 250 WO HCPCS: Performed by: STUDENT IN AN ORGANIZED HEALTH CARE EDUCATION/TRAINING PROGRAM

## 2025-03-28 PROCEDURE — 87086 URINE CULTURE/COLONY COUNT: CPT

## 2025-03-28 PROCEDURE — 87077 CULTURE AEROBIC IDENTIFY: CPT

## 2025-03-28 PROCEDURE — 96374 THER/PROPH/DIAG INJ IV PUSH: CPT

## 2025-03-28 PROCEDURE — 85025 COMPLETE CBC W/AUTO DIFF WBC: CPT

## 2025-03-28 RX ORDER — CIPROFLOXACIN 500 MG/1
500 TABLET, FILM COATED ORAL 2 TIMES DAILY
Qty: 14 TABLET | Refills: 0 | Status: SHIPPED | OUTPATIENT
Start: 2025-03-28 | End: 2025-04-04

## 2025-03-28 RX ADMIN — WATER 1000 MG: 1 INJECTION INTRAMUSCULAR; INTRAVENOUS; SUBCUTANEOUS at 07:55

## 2025-03-28 ASSESSMENT — PAIN DESCRIPTION - LOCATION
LOCATION: PENIS
LOCATION: PENIS;ABDOMEN
LOCATION: ABDOMEN

## 2025-03-28 ASSESSMENT — ENCOUNTER SYMPTOMS
RESPIRATORY NEGATIVE: 1
EYES NEGATIVE: 1
GASTROINTESTINAL NEGATIVE: 1

## 2025-03-28 ASSESSMENT — PAIN DESCRIPTION - ORIENTATION: ORIENTATION: LOWER

## 2025-03-28 ASSESSMENT — PAIN - FUNCTIONAL ASSESSMENT
PAIN_FUNCTIONAL_ASSESSMENT: PREVENTS OR INTERFERES SOME ACTIVE ACTIVITIES AND ADLS
PAIN_FUNCTIONAL_ASSESSMENT: 0-10
PAIN_FUNCTIONAL_ASSESSMENT: 0-10
PAIN_FUNCTIONAL_ASSESSMENT: ACTIVITIES ARE NOT PREVENTED
PAIN_FUNCTIONAL_ASSESSMENT: 0-10
PAIN_FUNCTIONAL_ASSESSMENT: 0-10
PAIN_FUNCTIONAL_ASSESSMENT: PREVENTS OR INTERFERES SOME ACTIVE ACTIVITIES AND ADLS

## 2025-03-28 ASSESSMENT — PAIN SCALES - GENERAL
PAINLEVEL_OUTOF10: 9
PAINLEVEL_OUTOF10: 2
PAINLEVEL_OUTOF10: 0
PAINLEVEL_OUTOF10: 4

## 2025-03-28 ASSESSMENT — PAIN DESCRIPTION - DESCRIPTORS
DESCRIPTORS: PRESSURE
DESCRIPTORS: SORE
DESCRIPTORS: BURNING

## 2025-03-28 ASSESSMENT — PAIN DESCRIPTION - PAIN TYPE
TYPE: ACUTE PAIN

## 2025-03-28 ASSESSMENT — PAIN DESCRIPTION - ONSET
ONSET: GRADUAL
ONSET: SUDDEN
ONSET: SUDDEN

## 2025-03-28 ASSESSMENT — LIFESTYLE VARIABLES
HOW OFTEN DO YOU HAVE A DRINK CONTAINING ALCOHOL: NEVER
HOW MANY STANDARD DRINKS CONTAINING ALCOHOL DO YOU HAVE ON A TYPICAL DAY: PATIENT DOES NOT DRINK

## 2025-03-28 ASSESSMENT — PAIN DESCRIPTION - FREQUENCY
FREQUENCY: CONTINUOUS

## 2025-03-28 NOTE — ED NOTES
Irrigated catheter with 1000 ml's, got 1050 ml's of return. Urine is less bloody but still a cherry color.

## 2025-03-28 NOTE — ED PROVIDER NOTES
The history is provided by the patient.   Male  Problem  Patient normally undergoes self catheterization at home due to difficulty with urination.  The patient states that he has been unable to self cath at home due to difficulties with manipulation of the self catheterization.  Patient states that he was having some increasing urinary retention and he also noticed that he had some hematuria.  The patient states that he feels as if he is full of urine so he came to the emergency department.  The patient has had a Lindsey catheter with leg bag in the past.  Patient currently sees [facility] for his urinary retention.  The patient states that he has been told by [facility] that his condition is going to have to be managed by self catheterization.  Patient has not had any recent dysuria or penile discharge.  Patient also denies any scrotal pain.  Patient also has not been having any fever, chills, nausea, vomiting or diarrhea.    Review of Systems   Constitutional: Negative.    HENT: Negative.     Eyes: Negative.    Respiratory: Negative.     Cardiovascular: Negative.    Gastrointestinal: Negative.    Genitourinary:  Positive for difficulty urinating.   Musculoskeletal: Negative.    Skin: Negative.    Neurological: Negative.    All other systems reviewed and are negative.      Family History   Problem Relation Age of Onset    Heart Disease Mother         Open Heart Surgery    Vision Loss Mother     Hearing Loss Mother     Cancer Father         Prostate Cancer    Heart Disease Father         Open Heart Surgery    Heart Disease Brother     Arthritis Brother     Diabetes Son     Other Son         \"2 Surgeries On Pancreas\"     Social History     Socioeconomic History    Marital status:      Spouse name: Not on file    Number of children: Not on file    Years of education: Not on file    Highest education level: Not on file   Occupational History    Not on file   Tobacco

## 2025-03-28 NOTE — ED PROVIDER NOTES
8:23 AM: Assumed care of patient at signout. For more details, please see note from same day. Briefly, Franko Odom is a 79 y.o. male with a PMH significant for urinary retention on intermittent self cath as outpatient, who presents for urinary retention and inability to do self cath due to hematuria and pain.    ED course summary:   -Hemodynamically stable.  -Placed Lindsey catheter, improvement of the symptoms, obtained significant hematuria, including blood clots.  -Initiated trend irrigation, urine has been clearing up, Lindsey continues to drain appropriately.    Plan at time of sign-out:   -Pending UA  -Continue irrigation until discharge is ready.    ED course since sign-out:  ED Course as of 03/28/25 0823   Fri Mar 28, 2025   0818 Urinalysis(!):    Color, UA Red(!)   Turbidity UA Cloudy(!)   Glucose, Ur 100(!)   Bilirubin, Urine NEGATIVE   Ketones, Urine TRACE(!)   Specific [place], UA 1.015   Urine Hgb LARGE(!)   pH, Urine 7.0   Protein, UA >=300(!)   Urobilinogen 2.0(!)   Nitrite, Urine POSITIVE(!)   Leukocyte Esterase, Urine MODERATE(!)  Abnormal urinalysis, suggestive of urinary tract infection.  Reviewed culture data, there is only 1 culture that grew Staphylococcus hominis, resistant to multiple antibiotics.  This is a nitrate positive infection, most likely from a gram-negative bacteria, but given urologic manipulation from recurrent in and out caths, gram-positive cocci are also possible.  Based on culture data, prescribed 1 dose of ceftriaxone to be given in the emergency department, and empiric antibiotic management with ciprofloxacin twice daily for 7 days. [SC]   0820 Performed bedside ultrasound of the bladder that revealed there was an inappropriate location, scant amount of blood clots, and an empty bladder.  The Lindsey catheter continues to drain, dark reddish urine.  The patient feels well, with his bladder empty.  Considering this, at this time no need for further interventions in

## 2025-03-28 NOTE — ED NOTES
1300 ml pale pink out  with specks of blood drained from bailey bag.  Pt ready to go home, Dr. Mas aware.

## 2025-03-28 NOTE — ED NOTES
2100 ml of lighter pink fluid drained from bailey with minimal red sediment. Bladder irrigation continues

## 2025-03-28 NOTE — ED PROVIDER NOTES
Emergency Department Encounter    Patient: Franko Odom  MRN: [id number]  : 1945  Date of Evaluation: 3/28/2025  ED Provider:  Sal Edouard MD    Triage Chief Complaint:   Urinary Catheter (Was here about 0500 this AM and had a catheter inserted. Thinks it is plugged already.)    [place]:  Franko Odom is a 79 y.o. male with history significant for CAD status post CABG, type 2 diabetes, anemia, acid reflux, hypertension, hyperlipidemia, seen in this emergency department and discharged this morning after he presented for urinary retention in the setting of hematuria, now status post Lindsey catheter placement, that presents after cessation of drainage through his Lindsey catheter.  The patient mentions that since he went home, the Lindsey catheter and did not drain anything.  He perceives his bladder full, and is unable to urinate.  Mentions that he is leaking bloody secretion through his urethra around the urinary catheter.  Denies abdominal pain.  Denies nausea or emesis, no other GI symptoms.  The tubing of the catheter is frankly bloody.  No fevers or chills.    ROS - see HPI, below listed is current ROS at time of my eval:  Systems reviewed and negative except as above.     Past Medical History:   Diagnosis Date    Acid reflux     Anemia     Arthritis     \"Wrists, Knees, Ankles And Spine\"    Belching     \"All The Time\"    CAD (coronary artery disease)     Sees Dr. Tara Cotter    Chronic back pain     Diabetes mellitus (HCC) Dx 's    \"I Go To [facility] For Diabetic Care, I See Georgia Toscnao CNP\"    Difficult intubation     Diverticulitis Dx Early 's    Echocardiogram 2019    EF 55-60%, Grade 1 diastolic dysfunction, No signifiant valvular disease note. No pericardial effusion.    H/O echocardiogram 2016    The left ventricular size is normal. There is mild concentric LVH. Trace to mild MR     H/O exercise stress test 2016    Patient tolerated well without focal

## 2025-03-28 NOTE — DISCHARGE INSTRUCTIONS
You were seen in the emergency department for blood in the urine.  Here, labs were overall reassuring, with a hemoglobin of 12.6, but a urine sample that suggestive of an infection.  For that reason, we gave you a dose of antibiotic through the pain, and prescribed the medication ciprofloxacin, and another antibiotic that you should continue taking orally twice daily (every 12 hours) for the next 7 days.     Please pick it up in your pharmacy and start taking it today.    Please, make an appointment to be seen by your urologist next week for a reassessment and to consider a Lindsey catheter removal.    I attached more information about general care of the catheter.    Please monitor the output by making sure that the volume in the collecting bag continues to increase, to ensure that you do not have recurrence of retention.     If at some point you have severe shortness of breath, severe nausea or vomiting unable to keep anything down, feels severely weak unable to stand up, feels confused or are lethargic unable to do your normal daily activities, or have any other concerning symptoms, please come back promptly to the emergency department.

## 2025-03-28 NOTE — ED NOTES
Removed coude catheter, using sterile procedure, inserted 22 FR catheter without difficulty, 350 ml dark burgundy urine out.  Initiated the continuous irrigation.

## 2025-03-29 ENCOUNTER — HOSPITAL ENCOUNTER (EMERGENCY)
Age: 80
Discharge: HOME | End: 2025-03-29
Attending: EMERGENCY MEDICINE
Payer: MEDICARE

## 2025-03-29 VITALS
DIASTOLIC BLOOD PRESSURE: 67 MMHG | HEART RATE: 68 BPM | TEMPERATURE: 97.7 F | SYSTOLIC BLOOD PRESSURE: 109 MMHG | OXYGEN SATURATION: 99 % | RESPIRATION RATE: 18 BRPM

## 2025-03-29 DIAGNOSIS — T83.198A RETENTION OF URINE DUE TO OCCLUSION OF FOLEY CATHETER: Primary | ICD-10-CM

## 2025-03-29 DIAGNOSIS — R31.0 GROSS HEMATURIA: ICD-10-CM

## 2025-03-29 DIAGNOSIS — R33.8 RETENTION OF URINE DUE TO OCCLUSION OF FOLEY CATHETER: Primary | ICD-10-CM

## 2025-03-29 PROCEDURE — 51700 IRRIGATION OF BLADDER: CPT

## 2025-03-29 PROCEDURE — 99282 EMERGENCY DEPT VISIT SF MDM: CPT

## 2025-03-29 ASSESSMENT — PAIN DESCRIPTION - LOCATION: LOCATION: OTHER (COMMENT)

## 2025-03-29 ASSESSMENT — PAIN SCALES - GENERAL: PAINLEVEL_OUTOF10: 5

## 2025-03-29 NOTE — ED TRIAGE NOTES
Patient presents to the ED with complaint of urinary catheter problem. Patient states he was seen yesterday and his catheter was working fine but that this morning it stopped draining.

## 2025-03-29 NOTE — ED PROVIDER NOTES
EMERGENCY DEPARTMENT ENCOUNTER      CHIEF COMPLAINT:   Occluded urinary catheter    HPI: Franko Odom is a 79 y.o. male who presents to the emergency department, with concern that his urinary catheter is occluded.  The patient has a history of urinary retention for which he uses to undergo self-catheterization at home.  He had been having increasing difficulty with the self-catheterization and so he presented to an [facility] emergency department on 3/28/2025 for acute urinary retention and hematuria.  He had a Lindsey catheter and leg bag placed.  He was found to have a UTI and was treated with IV Rocephin and discharged home on ciprofloxacin.  He was discharged home but unfortunately had to come back the same day as that Lindsey catheter became occluded.  He had a three-way catheter placed at that time that was irrigated until clear.  He states that when he went home, it was initially working but then became occluded at some point today.  He states it is no longer draining and he perceives that his bladder is full and uncomfortable.  Symptoms are constant.  There are no exacerbating or alleviating factors.  He denies any other complaints.    REVIEW OF SYSTEMS:   Constitutional:  Denies fever or chills  Eyes:  Denies change in visual acuity  HENT:  Denies nasal congestion or sore throat  Respiratory:  Denies cough or shortness of breath  Cardiovascular:  Denies chest pain or edema  GI:  Denies abdominal pain, nausea, vomiting, bloody stools or diarrhea  : See HPI  Musculoskeletal:  Denies back pain or joint pain  Integument:  Denies rash  Neurologic:  Denies headache, focal weakness or sensory changes  \"Remaining review of systems reviewed and negative. I have reviewed the nursing triage documentation and agree unless otherwise noted below.\"      PAST MEDICAL HISTORY:   Past Medical History:   Diagnosis Date    Acid reflux     Anemia     Arthritis     \"Wrists, Knees, Ankles And Spine\"    Belching     \"All The

## 2025-03-30 LAB
MICROORGANISM SPEC CULT: ABNORMAL
SERVICE CMNT-IMP: ABNORMAL
SPECIMEN DESCRIPTION: ABNORMAL

## 2025-03-31 ENCOUNTER — RESULTS FOLLOW-UP (OUTPATIENT)
Dept: PHARMACY | Age: 80
End: 2025-03-31

## 2025-03-31 NOTE — PROGRESS NOTES
Pharmacy Note  ED Culture Follow-up    Franko Odom is a 79 y.o. male.     Allergies: Exenatide, Lisinopril, and Sulfa antibiotics     Current antimicrobials:   Reviewed patient's urine culture - culture positive for E coli.  Patient was discharged on ciprofloxacin, and culture is sensitive to prescribed medication.  Antibiotic prescribed at discharge is appropriate - no changes made to antibiotic regimen.      Please call with any questions. Ext. [phone]    Courtney Warren RPH, PharmD 9:29 AM 3/31/2025

## 2025-04-01 ENCOUNTER — HOSPITAL ENCOUNTER (OUTPATIENT)
Dept: SLEEP CENTER | Age: 80
Discharge: HOME | End: 2025-04-01
Payer: MEDICARE

## 2025-04-01 DIAGNOSIS — G47.33 OSA (OBSTRUCTIVE SLEEP APNEA): Primary | ICD-10-CM

## 2025-04-01 PROCEDURE — 99211 OFF/OP EST MAY X REQ PHY/QHP: CPT

## 2025-04-01 PROCEDURE — 99213 OFFICE O/P EST LOW 20 MIN: CPT | Performed by: PSYCHIATRY & NEUROLOGY

## 2025-04-01 NOTE — PROGRESS NOTES
[facility]      Hector Pratt MD, FACP, [facility]  William Suggs MD, [facility]  Jaxson Rider MD, [facility]  Nyasia Muller DO  Lesly Stahl DO      [address]  [address]  [place] [address]   PH: [phone]  F: [phone]     Subjective:     Patient ID: Franko Odom is a 79 y.o. male, following up today with the sleep center.     Reason for Follow Up/Chief Complaint:   Chief Complaint   Patient presents with    G47.33         History: Mr. Odom was initially referred from neurointerventionalist after recent right cerebellar stroke. Sent for evaluation for possible LINDA as stroke-risk identification. He says he was dx'd with LINDA 10+ years ago but refused CPAP at that time.    He underwent PSG for suspected LINDA on 10/21/24 demonstrating severe LINDA .     He is now using CPAP and does feel better with use. However, he is fighting with the mask every night and would like to get one where the hose comes out on top of his head. He also is struggling with his urinary catheter and infections, this makes it hard to use his CPAP too.     He has lost about 70 pounds due to his medical issues. (20 pounds since his most recent sleep study)      Social History     Socioeconomic History    Marital status:      Spouse name: Not on file    Number of children: Not on file    Years of education: Not on file    Highest education level: Not on file   Occupational History    Not on file   Tobacco Use    Smoking status: Former     Current packs/day: 0.00     Average packs/day: 1.5 packs/day for 6.0 years (9.0 ttl pk-yrs)     Types: Cigarettes     Start date:      Quit date: 1970     Years since quittin.2    Smokeless tobacco: Never   Vaping Use    Vaping status: Never Used   Substance and Sexual Activity    Alcohol use: Not Currently     Comment: \"Very Seldom, Maybe Once Every Month Or More\"    Drug use: No    Sexual activity: Not Currently   Other Topics Concern    Not on

## 2025-04-14 ENCOUNTER — TRANSCRIBE ORDERS (OUTPATIENT)
Dept: ADMINISTRATIVE | Age: 80
End: 2025-04-14

## 2025-04-14 DIAGNOSIS — R31.29 MICROSCOPIC HEMATURIA: Primary | ICD-10-CM

## 2025-04-23 ENCOUNTER — HOSPITAL ENCOUNTER (OUTPATIENT)
Dept: CT IMAGING | Age: 80
Discharge: HOME | End: 2025-04-23
Attending: SPECIALIST
Payer: MEDICARE

## 2025-04-23 DIAGNOSIS — R31.29 MICROSCOPIC HEMATURIA: ICD-10-CM

## 2025-04-23 PROCEDURE — 6360000004 HC RX CONTRAST MEDICATION: Performed by: SPECIALIST

## 2025-04-23 PROCEDURE — 74178 CT ABD&PLV WO CNTR FLWD CNTR: CPT

## 2025-04-23 RX ORDER — IOPAMIDOL 755 MG/ML
100 INJECTION, SOLUTION INTRAVASCULAR
Status: COMPLETED | OUTPATIENT
Start: 2025-04-23 | End: 2025-04-23

## 2025-04-23 RX ADMIN — IOPAMIDOL 100 ML: 755 INJECTION, SOLUTION INTRAVENOUS at 12:22

## 2025-05-30 ENCOUNTER — HOSPITAL ENCOUNTER (EMERGENCY)
Age: 80
Discharge: ANOTHER HOSPITAL | End: 2025-05-31
Attending: EMERGENCY MEDICINE
Payer: MEDICARE

## 2025-05-30 ENCOUNTER — APPOINTMENT (OUTPATIENT)
Dept: CT IMAGING | Age: 80
End: 2025-05-30
Attending: EMERGENCY MEDICINE
Payer: MEDICARE

## 2025-05-30 DIAGNOSIS — R42 VERTIGO: Primary | ICD-10-CM

## 2025-05-30 LAB
ALBUMIN SERPL-MCNC: 4.2 G/DL (ref 3.4–5)
ALBUMIN/GLOB SERPL: 1.3 {RATIO}
ALP SERPL-CCNC: 56 U/L (ref 40–129)
ALT SERPL-CCNC: 15 U/L (ref 10–40)
ANION GAP SERPL CALCULATED.3IONS-SCNC: 16 MMOL/L (ref 9–17)
AST SERPL-CCNC: 24 U/L (ref 15–37)
BASOPHILS # BLD: 0.04 K/UL
BASOPHILS NFR BLD: 1 % (ref 0–1)
BILIRUB SERPL-MCNC: 0.5 MG/DL (ref 0–1)
BUN SERPL-MCNC: 22 MG/DL (ref 7–20)
CALCIUM SERPL-MCNC: 9.6 MG/DL (ref 8.3–10.6)
CHLORIDE SERPL-SCNC: 96 MMOL/L (ref 99–110)
CO2 SERPL-SCNC: 24 MMOL/L (ref 21–32)
CREAT SERPL-MCNC: 1.6 MG/DL (ref 0.8–1.3)
EOSINOPHIL # BLD: 0.14 K/UL
EOSINOPHILS RELATIVE PERCENT: 3 % (ref 0–3)
ERYTHROCYTE [DISTWIDTH] IN BLOOD BY AUTOMATED COUNT: 12.9 % (ref 11.7–14.9)
GFR, ESTIMATED: 45 ML/MIN/1.73M2
GLUCOSE BLD-MCNC: 126 MG/DL (ref 74–99)
GLUCOSE SERPL-MCNC: 144 MG/DL (ref 74–99)
HCT VFR BLD AUTO: 42.9 % (ref 42–52)
HGB BLD-MCNC: 14.4 G/DL (ref 13.5–18)
IMM GRANULOCYTES # BLD AUTO: 0.05 K/UL
IMM GRANULOCYTES NFR BLD: 1 %
INR PPP: 1.1
LYMPHOCYTES NFR BLD: 1.72 K/UL
LYMPHOCYTES RELATIVE PERCENT: 33 % (ref 24–44)
MCH RBC QN AUTO: 31.3 PG (ref 27–31)
MCHC RBC AUTO-ENTMCNC: 33.6 G/DL (ref 32–36)
MCV RBC AUTO: 93.3 FL (ref 78–100)
MONOCYTES NFR BLD: 0.69 K/UL
MONOCYTES NFR BLD: 13 % (ref 0–5)
NEUTROPHILS NFR BLD: 50 % (ref 36–66)
NEUTS SEG NFR BLD: 2.66 K/UL
PLATELET # BLD AUTO: 219 K/UL (ref 140–440)
PMV BLD AUTO: 9.7 FL (ref 7.5–11.1)
POTASSIUM SERPL-SCNC: 4.1 MMOL/L (ref 3.5–5.1)
PROT SERPL-MCNC: 7.5 G/DL (ref 6.4–8.2)
PROTHROMBIN TIME: 14.4 SEC (ref 11.7–14.5)
RBC # BLD AUTO: 4.6 M/UL (ref 4.6–6.2)
SODIUM SERPL-SCNC: 136 MMOL/L (ref 136–145)
TROPONIN I SERPL HS-MCNC: 8 NG/L (ref 0–22)
WBC OTHER # BLD: 5.3 K/UL (ref 4–10.5)

## 2025-05-30 PROCEDURE — 6360000004 HC RX CONTRAST MEDICATION: Performed by: EMERGENCY MEDICINE

## 2025-05-30 PROCEDURE — 85610 PROTHROMBIN TIME: CPT

## 2025-05-30 PROCEDURE — 70450 CT HEAD/BRAIN W/O DYE: CPT

## 2025-05-30 PROCEDURE — 84484 ASSAY OF TROPONIN QUANT: CPT

## 2025-05-30 PROCEDURE — 85025 COMPLETE CBC W/AUTO DIFF WBC: CPT

## 2025-05-30 PROCEDURE — 99285 EMERGENCY DEPT VISIT HI MDM: CPT

## 2025-05-30 PROCEDURE — 6370000000 HC RX 637 (ALT 250 FOR IP): Performed by: EMERGENCY MEDICINE

## 2025-05-30 PROCEDURE — 80053 COMPREHEN METABOLIC PANEL: CPT

## 2025-05-30 PROCEDURE — 93005 ELECTROCARDIOGRAM TRACING: CPT | Performed by: EMERGENCY MEDICINE

## 2025-05-30 PROCEDURE — 82962 GLUCOSE BLOOD TEST: CPT

## 2025-05-30 PROCEDURE — 70498 CT ANGIOGRAPHY NECK: CPT

## 2025-05-30 RX ORDER — IOPAMIDOL 755 MG/ML
85 INJECTION, SOLUTION INTRAVASCULAR
Status: COMPLETED | OUTPATIENT
Start: 2025-05-30 | End: 2025-05-30

## 2025-05-30 RX ORDER — ASPIRIN 81 MG/1
324 TABLET, CHEWABLE ORAL ONCE
Status: COMPLETED | OUTPATIENT
Start: 2025-05-30 | End: 2025-05-30

## 2025-05-30 RX ADMIN — ASPIRIN 324 MG: 81 TABLET, CHEWABLE ORAL at 19:50

## 2025-05-30 RX ADMIN — IOPAMIDOL 85 ML: 755 INJECTION, SOLUTION INTRAVENOUS at 18:33

## 2025-05-30 ASSESSMENT — PAIN - FUNCTIONAL ASSESSMENT: PAIN_FUNCTIONAL_ASSESSMENT: NONE - DENIES PAIN

## 2025-05-30 NOTE — ED PROVIDER NOTES
EMERGENCY DEPARTMENT ENCOUNTER      CHIEF COMPLAINT:   Vertigo    HPI: Franko Odom is a 79 y.o. male who presents to the emergency department, via private vehicle, for evaluation of dizziness.  The patient states that he was out to dinner and when he stood up after dinner he felt acutely dizzy.  He describes it as vertigo.  He states that it was associated with some blurred vision.  He thought he was going to fall as he felt so off balance.  Symptoms lasted 15 minutes and then resolved.  He is currently asymptomatic.  He denies any confusion, slurred speech or motor weakness.  He denies any numbness or tingling.  The patient had a CVA in July 2024 at which time he was given TNK.  MRI showed an acute infarct in the right middle cerebellar peduncle.  The patient was started on aspirin and Plavix and was to continue his statin.  He otherwise denies fevers, chills, chest pain, shortness of breath, nausea, vomiting or any other complaints.    REVIEW OF SYSTEMS:   Constitutional:  Denies fever or chills  Eyes:  Denies change in visual acuity  HENT:  Denies nasal congestion or sore throat  Respiratory:  Denies cough or shortness of breath  Cardiovascular:  Denies chest pain or edema  GI:  Denies abdominal pain, nausea, vomiting, bloody stools or diarrhea  :  Denies dysuria  Musculoskeletal:  Denies back pain or joint pain  Integument:  Denies rash  Neurologic: See HPI  \"Remaining review of systems reviewed and negative. I have reviewed the nursing triage documentation and agree unless otherwise noted below.\"      PAST MEDICAL HISTORY:   Past Medical History:   Diagnosis Date    Acid reflux     Anemia     Arthritis     \"Wrists, Knees, Ankles And Spine\"    Belching     \"All The Time\"    CAD (coronary artery disease)     Sees Dr. Tara Cotter    Chronic back pain     Diabetes mellitus (HCC) Dx 1990's    \"I Go To [facility] For Diabetic Care, I See Georgia Toscano CNP\"    Difficult intubation     Diverticulitis Dx Early

## 2025-05-31 ENCOUNTER — APPOINTMENT (OUTPATIENT)
Dept: MRI IMAGING | Age: 80
End: 2025-05-31
Attending: INTERNAL MEDICINE
Payer: MEDICARE

## 2025-05-31 ENCOUNTER — HOSPITAL ENCOUNTER (OUTPATIENT)
Age: 80
Setting detail: OBSERVATION
Discharge: HOME | End: 2025-05-31
Attending: INTERNAL MEDICINE | Admitting: INTERNAL MEDICINE
Payer: MEDICARE

## 2025-05-31 VITALS
BODY MASS INDEX: 25.2 KG/M2 | SYSTOLIC BLOOD PRESSURE: 114 MMHG | DIASTOLIC BLOOD PRESSURE: 70 MMHG | OXYGEN SATURATION: 94 % | TEMPERATURE: 97.8 F | WEIGHT: 180 LBS | RESPIRATION RATE: 15 BRPM | HEART RATE: 77 BPM | HEIGHT: 71 IN

## 2025-05-31 VITALS
WEIGHT: 184.08 LBS | SYSTOLIC BLOOD PRESSURE: 108 MMHG | HEART RATE: 87 BPM | OXYGEN SATURATION: 96 % | BODY MASS INDEX: 26.35 KG/M2 | DIASTOLIC BLOOD PRESSURE: 76 MMHG | HEIGHT: 70 IN | TEMPERATURE: 97.9 F | RESPIRATION RATE: 14 BRPM

## 2025-05-31 PROBLEM — R42 VERTIGO: Status: ACTIVE | Noted: 2025-05-31

## 2025-05-31 LAB
ANION GAP SERPL CALCULATED.3IONS-SCNC: 13 MMOL/L (ref 9–17)
BUN SERPL-MCNC: 20 MG/DL (ref 7–20)
CALCIUM SERPL-MCNC: 9.1 MG/DL (ref 8.3–10.6)
CHLORIDE SERPL-SCNC: 103 MMOL/L (ref 99–110)
CHOLEST SERPL-MCNC: 113 MG/DL (ref 125–199)
CO2 SERPL-SCNC: 20 MMOL/L (ref 21–32)
CREAT SERPL-MCNC: 1.2 MG/DL (ref 0.8–1.3)
EST. AVERAGE GLUCOSE BLD GHB EST-MCNC: 119 MG/DL
GFR, ESTIMATED: 57 ML/MIN/1.73M2
GLUCOSE BLD-MCNC: 147 MG/DL (ref 74–99)
GLUCOSE BLD-MCNC: 87 MG/DL (ref 74–99)
GLUCOSE SERPL-MCNC: 77 MG/DL (ref 74–99)
HBA1C MFR BLD: 5.8 % (ref 4.2–6.3)
HDLC SERPL-MCNC: 44 MG/DL
LDLC SERPL CALC-MCNC: 55 MG/DL
POTASSIUM SERPL-SCNC: 3.6 MMOL/L (ref 3.5–5.1)
SODIUM SERPL-SCNC: 135 MMOL/L (ref 136–145)
TRIGL SERPL-MCNC: 71 MG/DL

## 2025-05-31 PROCEDURE — G0378 HOSPITAL OBSERVATION PER HR: HCPCS

## 2025-05-31 PROCEDURE — 96372 THER/PROPH/DIAG INJ SC/IM: CPT

## 2025-05-31 PROCEDURE — 82962 GLUCOSE BLOOD TEST: CPT

## 2025-05-31 PROCEDURE — 99223 1ST HOSP IP/OBS HIGH 75: CPT | Performed by: NURSE PRACTITIONER

## 2025-05-31 PROCEDURE — 6360000002 HC RX W HCPCS: Performed by: INTERNAL MEDICINE

## 2025-05-31 PROCEDURE — 94761 N-INVAS EAR/PLS OXIMETRY MLT: CPT

## 2025-05-31 PROCEDURE — 6370000000 HC RX 637 (ALT 250 FOR IP): Performed by: INTERNAL MEDICINE

## 2025-05-31 PROCEDURE — 80048 BASIC METABOLIC PNL TOTAL CA: CPT

## 2025-05-31 PROCEDURE — G0379 DIRECT REFER HOSPITAL OBSERV: HCPCS

## 2025-05-31 PROCEDURE — 36415 COLL VENOUS BLD VENIPUNCTURE: CPT

## 2025-05-31 PROCEDURE — 83036 HEMOGLOBIN GLYCOSYLATED A1C: CPT

## 2025-05-31 PROCEDURE — 80061 LIPID PANEL: CPT

## 2025-05-31 PROCEDURE — 70551 MRI BRAIN STEM W/O DYE: CPT

## 2025-05-31 RX ORDER — TAMSULOSIN HYDROCHLORIDE 0.4 MG/1
0.4 CAPSULE ORAL DAILY
Status: DISCONTINUED | OUTPATIENT
Start: 2025-05-31 | End: 2025-05-31 | Stop reason: HOSPADM

## 2025-05-31 RX ORDER — ASPIRIN 300 MG/1
300 SUPPOSITORY RECTAL DAILY
Status: DISCONTINUED | OUTPATIENT
Start: 2025-05-31 | End: 2025-05-31 | Stop reason: HOSPADM

## 2025-05-31 RX ORDER — POLYETHYLENE GLYCOL 3350 17 G/17G
17 POWDER, FOR SOLUTION ORAL DAILY PRN
Status: DISCONTINUED | OUTPATIENT
Start: 2025-05-31 | End: 2025-05-31 | Stop reason: HOSPADM

## 2025-05-31 RX ORDER — SODIUM CHLORIDE 0.9 % (FLUSH) 0.9 %
5-40 SYRINGE (ML) INJECTION PRN
Status: DISCONTINUED | OUTPATIENT
Start: 2025-05-31 | End: 2025-05-31 | Stop reason: HOSPADM

## 2025-05-31 RX ORDER — ONDANSETRON 2 MG/ML
4 INJECTION INTRAMUSCULAR; INTRAVENOUS EVERY 6 HOURS PRN
Status: DISCONTINUED | OUTPATIENT
Start: 2025-05-31 | End: 2025-05-31 | Stop reason: HOSPADM

## 2025-05-31 RX ORDER — SODIUM CHLORIDE 0.9 % (FLUSH) 0.9 %
5-40 SYRINGE (ML) INJECTION EVERY 12 HOURS SCHEDULED
Status: DISCONTINUED | OUTPATIENT
Start: 2025-05-31 | End: 2025-05-31 | Stop reason: HOSPADM

## 2025-05-31 RX ORDER — CLOPIDOGREL BISULFATE 75 MG/1
75 TABLET ORAL DAILY
Status: DISCONTINUED | OUTPATIENT
Start: 2025-06-01 | End: 2025-05-31 | Stop reason: HOSPADM

## 2025-05-31 RX ORDER — ONDANSETRON 4 MG/1
4 TABLET, ORALLY DISINTEGRATING ORAL EVERY 8 HOURS PRN
Status: DISCONTINUED | OUTPATIENT
Start: 2025-05-31 | End: 2025-05-31 | Stop reason: HOSPADM

## 2025-05-31 RX ORDER — INSULIN LISPRO 100 [IU]/ML
0-8 INJECTION, SOLUTION INTRAVENOUS; SUBCUTANEOUS
Status: DISCONTINUED | OUTPATIENT
Start: 2025-05-31 | End: 2025-05-31 | Stop reason: HOSPADM

## 2025-05-31 RX ORDER — SODIUM CHLORIDE 9 MG/ML
INJECTION, SOLUTION INTRAVENOUS PRN
Status: DISCONTINUED | OUTPATIENT
Start: 2025-05-31 | End: 2025-05-31 | Stop reason: HOSPADM

## 2025-05-31 RX ORDER — ASPIRIN 81 MG/1
81 TABLET, CHEWABLE ORAL DAILY
Status: DISCONTINUED | OUTPATIENT
Start: 2025-05-31 | End: 2025-05-31 | Stop reason: HOSPADM

## 2025-05-31 RX ORDER — ATORVASTATIN CALCIUM 40 MG/1
40 TABLET, FILM COATED ORAL NIGHTLY
Status: DISCONTINUED | OUTPATIENT
Start: 2025-05-31 | End: 2025-05-31

## 2025-05-31 RX ORDER — ROSUVASTATIN CALCIUM 20 MG/1
20 TABLET, COATED ORAL EVERY MORNING
Status: DISCONTINUED | OUTPATIENT
Start: 2025-06-01 | End: 2025-05-31 | Stop reason: HOSPADM

## 2025-05-31 RX ORDER — FOLIC ACID 1 MG/1
1 TABLET ORAL DAILY
Status: DISCONTINUED | OUTPATIENT
Start: 2025-06-01 | End: 2025-05-31 | Stop reason: HOSPADM

## 2025-05-31 RX ORDER — RANOLAZINE 500 MG/1
500 TABLET, EXTENDED RELEASE ORAL 2 TIMES DAILY
Status: DISCONTINUED | OUTPATIENT
Start: 2025-05-31 | End: 2025-05-31 | Stop reason: HOSPADM

## 2025-05-31 RX ORDER — GLUCAGON 1 MG/ML
1 KIT INJECTION PRN
Status: DISCONTINUED | OUTPATIENT
Start: 2025-05-31 | End: 2025-05-31 | Stop reason: HOSPADM

## 2025-05-31 RX ORDER — ENOXAPARIN SODIUM 100 MG/ML
40 INJECTION SUBCUTANEOUS DAILY
Status: DISCONTINUED | OUTPATIENT
Start: 2025-05-31 | End: 2025-05-31 | Stop reason: HOSPADM

## 2025-05-31 RX ORDER — LOSARTAN POTASSIUM 25 MG/1
25 TABLET ORAL DAILY
Status: DISCONTINUED | OUTPATIENT
Start: 2025-06-01 | End: 2025-05-31 | Stop reason: HOSPADM

## 2025-05-31 RX ORDER — DEXTROSE MONOHYDRATE 100 MG/ML
INJECTION, SOLUTION INTRAVENOUS CONTINUOUS PRN
Status: DISCONTINUED | OUTPATIENT
Start: 2025-05-31 | End: 2025-05-31 | Stop reason: HOSPADM

## 2025-05-31 RX ORDER — LANOLIN ALCOHOL/MO/W.PET/CERES
100 CREAM (GRAM) TOPICAL DAILY
Status: DISCONTINUED | OUTPATIENT
Start: 2025-06-01 | End: 2025-05-31 | Stop reason: HOSPADM

## 2025-05-31 RX ORDER — CARVEDILOL 6.25 MG/1
6.25 TABLET ORAL 2 TIMES DAILY WITH MEALS
Status: DISCONTINUED | OUTPATIENT
Start: 2025-05-31 | End: 2025-05-31 | Stop reason: HOSPADM

## 2025-05-31 RX ADMIN — TAMSULOSIN HYDROCHLORIDE 0.4 MG: 0.4 CAPSULE ORAL at 08:05

## 2025-05-31 RX ADMIN — ENOXAPARIN SODIUM 40 MG: 100 INJECTION SUBCUTANEOUS at 08:06

## 2025-05-31 RX ADMIN — ASPIRIN 81 MG: 81 TABLET, CHEWABLE ORAL at 08:06

## 2025-05-31 NOTE — DISCHARGE INSTRUCTIONS
While in the hospital, you were evaluated for dizziness. Your MRI brain was normal. Your dizziness could have been due to an inner ear issue or dehydration. The neurologist did not recommend any medication changes. Make sure you are staying hydrated with plenty of water every day. Follow-up with your primary care doctor.

## 2025-05-31 NOTE — CONSULTS
Neurology Service Consult Note  [facility]   Patient Name: Franko Odom  : 1945        Subjective:   Reason for consult: Vertigo  79 y.o. - male with past medical history of Right cerebellar peduncle stroke, HTN, HLD, T2DM, CABG x4 presenting to [facility] due to complaints of sudden onset of dizziness that lasted for about 15 minutes before resolving.  Stroke alert in the ED, not a candidate for intervention due to NIH 0 and no LVO on CTA. Neurology consulted for further recommendations for his vertigo.   Patient seen and examined. On entering room patient is sitting up in bed with head of bed elevated. No acute distress noted. Patient is alert and oriented x4, follows all commands. States that he is feeling back to his normal self.  Patient states that he has been in his normal state of health. Yesterday was out to dinner with his wife and was getting ready to leave, states he was up at the front to pay his bill when all of a sudden he stated feeling dizzy and off balance. States that he also had blurred vision at that time, \"I felt off kilter\".  Patient denies any loss of consciousness and did not fall during the episode. Describes the dizziness as items/the room was moving around him. Symptoms lasted for about 15 to 20 minutes before resolving.  States that last year in July he had a stroke and had same symptoms except at that time his symptoms of vertigo lasted for a few days before slowly resolving.  Patient was anxious that he was having another stroke so he did present to the ED.  Denies any recent head trauma, illnesses, tinnitus or medication changes.    In the ED stroke alert was called and patient was evaluated by [facility] telestroke, NIH 0 and not intervention warranted.  CT head and CTA head and neck non-acute.        Past Medical History:   Diagnosis Date    Acid reflux     Anemia     Arthritis     \"Wrists, Knees, Ankles And Spine\"    Belching     \"All  Needs: No Transportation Needs (5/31/2025)    PRAPARE - Transportation     Lack of Transportation (Medical): No     Lack of Transportation (Non-Medical): No   Physical Activity: Not on file   Stress: Not on file   Social Connections: Not on file   Intimate Partner Violence: Not on file   Housing Stability: Low Risk  (5/31/2025)    Housing Stability Vital Sign     Unable to Pay for Housing in the Last Year: No     Number of Times Moved in the Last Year: 0     Homeless in the Last Year: No      Family History   Problem Relation Age of Onset    Heart Disease Mother         Open Heart Surgery    Vision Loss Mother     Hearing Loss Mother     Cancer Father         Prostate Cancer    Heart Disease Father         Open Heart Surgery    Heart Disease Brother     Arthritis Brother     Diabetes Son     Other Son         \"2 Surgeries On Pancreas\"         ROS (10 systems)  In addition to that documented in the HPI above, the additional ROS was obtained:  Constitutional: Denies fevers or chills  Eyes: Denies vision changes  ENMT: Denies sore throat  CV: Denies chest pain  Resp: Denies SOB  GI: Denies vomiting or diarrhea  : Denies painful urination  MSK: Denies recent trauma  Skin: Denies new rashes  Neuro: Denies new numbness or tingling or weakness  Endocrine: Denies unexpected weight loss  Heme: Denies bleeding disorders    Physical Exam:       [unfilled]   Wt Readings from Last 3 Encounters:   05/31/25 83.5 kg (184 lb 1.4 oz)   05/30/25 81.6 kg (180 lb)   03/28/25 82.6 kg (182 lb 3.2 oz)     Temp Readings from Last 3 Encounters:   05/31/25 97.9 °F (36.6 °C) (Oral)   05/30/25 97.8 °F (36.6 °C) (Oral)   03/29/25 97.7 °F (36.5 °C) (Oral)     BP Readings from Last 3 Encounters:   05/31/25 108/76   05/31/25 114/70   03/29/25 109/67     Pulse Readings from Last 3 Encounters:   05/31/25 87   05/31/25 77   03/29/25 68        Gen: A&O x 4, NAD, cooperative  HEENT: NC/AT, EOMI, PERRL, mmm, no carotid bruits, neck supple, no

## 2025-05-31 NOTE — DISCHARGE SUMMARY
Vertigo    Discharge Instruction:   Follow up appointments: PCP  Primary care physician: Melvi Howard MD within 2 weeks  Diet: cardiac diet   Activity: activity as tolerated  Disposition: Discharged to:   [x]Home, []C, []SNF, []Acute Rehab, []Hospice   Condition on discharge: Stable  Labs and Tests to be Followed up as an outpatient by PCP or Specialist:     Discharge Medications:        Medication List        CONTINUE taking these medications      carvedilol 6.25 MG tablet  Commonly known as: COREG  TAKE 1 TABLET BY MOUTH TWICE A DAY     clopidogrel 75 MG tablet  Commonly known as: PLAVIX  Take 1 tablet by mouth daily     folic acid 1 MG tablet  Commonly known as: FOLVITE  Take 1 tablet by mouth daily     Jardiance 25 MG tablet  Generic drug: empagliflozin     losartan 25 MG tablet  Commonly known as: COZAAR  Take 1 tablet by mouth daily     metFORMIN 1000 MG tablet  Commonly known as: GLUCOPHAGE     Mounjaro 7.5 MG/0.5ML Soaj pen  Generic drug: Tirzepatide     ranolazine 500 MG extended release tablet  Commonly known as: RANEXA  TAKE 1 TABLET BY MOUTH TWICE A DAY     rosuvastatin 20 MG tablet  Commonly known as: CRESTOR  TAKE 1 TABLET BY MOUTH EVERY DAY IN THE MORNING     tamsulosin 0.4 MG capsule  Commonly known as: FLOMAX  Take 1 capsule by mouth daily     TYLENOL PO     vitamin B-1 100 MG tablet  Commonly known as: THIAMINE  Take 1 tablet by mouth daily             Objective Findings at Discharge:   /76   Pulse 87   Temp 97.9 °F (36.6 °C) (Oral)   Resp 14   Ht 1.778 m (5' 10\")   Wt 83.5 kg (184 lb 1.4 oz)   SpO2 96%   BMI 26.41 kg/m²       Physical Exam:   General: NAD  Eyes: EOMI  ENT: neck supple  Cardiovascular: Regular rate.  Respiratory: Clear to auscultation bilaterally  Gastrointestinal: Abdomen soft, non tender  Genitourinary: no suprapubic tenderness  Musculoskeletal: No edema  Skin: warm, dry  Neuro: Alert.  Psych: Mood appropriate.         Labs and Imaging   MRI brain  without contrast  Result Date: 5/31/2025  EXAMINATION: MRI BRAIN WO CONTRAST DATE OF EXAM:  5/31/2025 6:55 DEMOGRAPHICS: 79 years old Male INDICATION: dizziness COMPARISON: 7-28-24 TECHNIQUE: Multisequence, multiplanar MRI of the brain was performed without IV contrast. FINDINGS: MRI BRAIN: There is no restricted diffusion to suggest acute infarction. Mild motion artifact limits evaluation. Gray-white differentiation is maintained. Patchy areas of T2 prolongation in the periventricular white matter. Sella and parasellar structures are normal. Midline structures are normal. Posterior fossa  is. No mass effect or midline shift. Focal area of gradient susceptibility in the right parietal lobe unchanged. IMPRESSION: 1.  No acute intracranial abnormality 2.  Chronic small vessel ischemic changes This dictation was created with voice recognition software.  While attempts have  been made to review the dictation as it is transcribed, on occasion the spoken word can be misinterpreted by the technology leading to omissions or inappropriate words, phrases or sentences.  Dictated and Electronically Signed By: Leslie Middleton MD [facility] 5/31/2025 7:54        CTA HEAD NECK W CONTRAST  Result Date: 5/30/2025  CTA HEAD NECK W CONTRAST, 5/30/2025 18:16. 79 years, Male Reason for exam/History: Vertigo Contrast utilized and the relevant clinical information: IOPAMIDOL 76 % IV SOLN 85mL Comparison: NONE Procedure and Materials: Helical images were obtained in the axial plane during the rapid administration of intravenous contrast. The exam was timed to best evaluate and opacify the arterial structures. The examination is reviewed at soft tissue, and lung windows. 2D and 3D reconstructions are performed of the target arterial structures. This evaluation was performed utilizing NASCET criteria. CT radiation dose reduction/ optimization techniques (automated exposure control, use of iterative reconstruction  transcribed, on occasion the spoken word can be misinterpreted by the technology leading to omissions or inappropriate words, phrases or sentences.  Dictated and Electronically Signed By: Niels Kat MD [facility] 5/30/2025 18:32          CBC:   Recent Labs     05/30/25  1820   WBC 5.3   HGB 14.4        BMP:    Recent Labs     05/30/25  1820 05/31/25  0450    135*   K 4.1 3.6   CL 96* 103   CO2 24 20*   BUN 22* 20   CREATININE 1.6* 1.2   GLUCOSE 144* 77     Hepatic:   Recent Labs     05/30/25  1820   AST 24   ALT 15   BILITOT 0.5   ALKPHOS 56     Lipids:   Lab Results   Component Value Date/Time    CHOL 113 05/31/2025 04:50 AM    HDL 44 05/31/2025 04:50 AM    TRIG 71 05/31/2025 04:50 AM     Hemoglobin A1C:   Lab Results   Component Value Date/Time    LABA1C 5.8 05/31/2025 04:50 AM     TSH: No results found for: \"TSH\"  Troponin: No results found for: \"TROPONINT\"  Lactic Acid: No results for input(s): \"LACTA\" in the last 72 hours.  BNP: No results for input(s): \"PROBNP\" in the last 72 hours.  UA:  Lab Results   Component Value Date/Time    NITRU POSITIVE 03/28/2025 06:50 AM    COLORU Red 03/28/2025 06:50 AM    PHUR 7.0 03/28/2025 06:50 AM    WBCUA 21 TO 50 03/28/2025 06:50 AM    RBCUA TOO NUMEROUS TO COUNT 03/28/2025 06:50 AM    RBCUA <1 09/08/2016 07:45 AM    BACTERIA MODERATE 03/28/2025 06:50 AM    CLARITYU CLEAR 09/08/2016 07:45 AM    LEUKOCYTESUR MODERATE 03/28/2025 06:50 AM    UROBILINOGEN 2.0 03/28/2025 06:50 AM    BILIRUBINUR NEGATIVE 03/28/2025 06:50 AM    BLOODU NEGATIVE 09/08/2016 07:45 AM    GLUCOSEU 100 03/28/2025 06:50 AM    KETUA TRACE 03/28/2025 06:50 AM     Urine Cultures: No results found for: \"LABURIN\"  Blood Cultures: No results found for: \"BC\"  No results found for: \"BLOODCULT2\"  Organism: No results found for: \"ORG\"    Time Spent Discharging patient 35 minutes    Electronically signed by Anila Mckeon PA-C on 5/31/2025 at 1:01 PM

## 2025-05-31 NOTE — PLAN OF CARE
Problem: Chronic Conditions and Co-morbidities  Goal: Patient's chronic conditions and co-morbidity symptoms are monitored and maintained or improved  Outcome: Completed     Problem: Discharge Planning  Goal: Discharge to home or other facility with appropriate resources  Outcome: Completed  Flowsheets (Taken 5/31/2025 0155 by Tino Spencer RN)  Discharge to home or other facility with appropriate resources: Refer to discharge planning if patient needs post-hospital services based on physician order or complex needs related to functional status, cognitive ability or social support system

## 2025-05-31 NOTE — PROGRESS NOTES
4 Eyes Skin Assessment     NAME:  Franko Odom  YOB: 1945  MEDICAL RECORD NUMBER:  [id number]    The patient is being assessed for  Admission    I agree that at least one RN has performed a thorough Head to Toe Skin Assessment on the patient. ALL assessment sites listed below have been assessed.      Areas assessed by both nurses:    Head, Face, Ears, Shoulders, Back, Chest, Arms, Elbows, Hands, Sacrum. Buttock, Coccyx, Ischium, Legs. Feet and Heels, and Under Medical Devices         Does the Patient have a Wound? No noted wound(s)       Joseph Prevention initiated by RN: No  Wound Care Orders initiated by RN: No    Pressure Injury (Stage 3,4, Unstageable, DTI, NWPT, and Complex wounds) if present, place Wound referral order by RN under : No    New Ostomies, if present place, Ostomy referral order under : No     Nurse 1 eSignature: Electronically signed by Tino Spencer RN on 5/31/25 at 6:58 AM EDT    **SHARE this note so that the co-signing nurse can place an eSignature**    Nurse 2 eSignature: Electronically signed by Janet Trujillo RN on 5/31/25 at 7:00 AM EDT

## 2025-05-31 NOTE — H&P
History and Physical      Name:  Franko Odom /Age/Sex: 1945  (79 y.o. male)   MRN & CSN:  [id number] & [id number] Encounter Date/Time: 2025 2:21 AM EDT   Location:  [facility]  PCP: Melvi Howard MD       Hospital Day: 1    Assessment and Plan:     #.  Dizziness  - Stroke alert called in ED, was not a TNK candidate  - Currently asymptomatic  - CT head, CTA head and neck-no acute process  -Continue aspirin, statin.  Patient is also on Plavix  -MRI brain ordered  -Neurology consultation  - Continue NIHSS  -PT/OT evaluation.    #.  Coronary artery disease status post CABG-2016  - On aspirin, carvedilol, losartan, rosuvastatin, ranolazine    #.  History of CVA-2024  - Status post TNK  - MRI brain-acute infarct in the right middle cerebellar peduncle  - Patient is on aspirin, Plavix, statin  - Patient reports having intermittent balance issues.    #.  LINDA on CPAP    #.  Hypertension-losartan, carvedilol    #.  Hyperlipidemia    #.  Diabetes mellitus type 2  - On metformin, Jardiance, Mounjaro-hold  - Continue insulin sliding scale with hypoglycemia protocol    #.  BPH  - chronic indwelling Lindsey catheter  - Recently exchanged  - Patient is no longer taking Flomax    #.  Bilateral hearing loss    Disposition:   Current Living situation: Home    Diet Carb controlled, cardiac diet after swallow evaluation   DVT Prophylaxis [x] Lovenox   Code Status Full Code   Surrogate Decision Maker/ POA      History from:   EMR, patient.    History of Present Illness:     Chief Complaint: Vertigo  Franko Odom is a 79 y.o. male with coronary artery disease status post CABG, history of CVA residual deficits, intermittent issues with balance, hypertension, hyperlipidemia, diabetes mellitus type 2, chronic indwelling Lindsey catheter, bilateral hearing loss, presented to [facility] ED with complaints of dizziness.  Patient reported that he was out for dinner.  When he went to pay his bill, he  (CRESTOR) 20 MG tablet TAKE 1 TABLET BY MOUTH EVERY DAY IN THE MORNING 1/6/25  Yes Geovani Arnold MD   ranolazine (RANEXA) 500 MG extended release tablet TAKE 1 TABLET BY MOUTH TWICE A DAY 12/10/24  Yes Gena Moreno APRN - CNP   vitamin B-1 (THIAMINE) 100 MG tablet Take 1 tablet by mouth daily 8/12/24  Yes Linda Cullen MD   JARDIANCE 25 MG tablet TAKE 1 TABLET BY MOUTH EVERY DAY 11/8/19  Yes ProviderEmily MD   metFORMIN (GLUCOPHAGE) 1000 MG tablet Take 1 tablet by mouth 2 times daily (with meals)   Yes Provider, MD Emily   tamsulosin (FLOMAX) 0.4 MG capsule Take 1 capsule by mouth daily 1/7/25   Bassam Vail DO   Acetaminophen (TYLENOL PO) Take by mouth as needed Over The Counter    ProviderEmily MD       Physical Exam: Need 8 Elements   Physical Exam     GEN  -Awake, alert, NAD.   EYES   -PERRL.   HENT  -MM are moist.   RESP  -LS CTA equal bilat, no wheezes, rales or rhonchi. Symmetric chest movement. No respiratory distress noted.  C/V  -S1/S2 auscultated. RRR, without appreciable M/R/G. Peripheral pulses equal bilaterally and palpable. No peripheral edema.   GI  -Abdomen is soft, non-distended, no significant tenderness. No masses or guarding. + BS in all quadrants. Rectal exam deferred.     -No CVA tenderness. Lindsey catheter is not present.  MS  -B/L extremities - No gross joint deformities. No swelling, intact sensation symmetrical.   SKIN  -Normal coloration, warm, dry.   NEURO  -  NEUROLOGIC EXAM:  Mental Status: A&O to self, location, month and year, speech clear, language fluent, repetition and naming intact, follows commands appropriately  Cranial Nerve Exam: CN II-XII:  PERRL, no nystagmus, no gaze paresis, sensation V1-V3 intact b/l, muscles of facial expression symmetric; hearing intact to conversational tone, shoulder elevation symmetric and tongue protrudes midline with movement side to side.  Motor Exam: Strength 5/5 bilateral upper and lower  FINDINGS: Noncontrast CT images of the head were provided along with coronal reformatted images Sinuses and mastoids: There are no fluid levels. Orbits: There is no acute orbital abnormality. Brain: Ventricles and sulci are stable. There are no new areas of abnormal density in the parenchyma. Multiple vascular calcifications are noted. Minimal white matter small vessel ischemic changes are suggested. There is no hemorrhage  or extra-axial collection IMPRESSION: 1.  No acute intracranial abnormality This dictation was created with voice recognition software.  While attempts have  been made to review the dictation as it is transcribed, on occasion the spoken word can be misinterpreted by the technology leading to omissions or inappropriate words, phrases or sentences.  Dictated and Electronically Signed By: Niels Kat MD [facility] 5/30/2025 18:32          Personally reviewed Lab Studies, Imaging.    Electronically signed by Braulio Deleon MD on 5/31/2025 at 2:21 AM    Comment: Please note this report has been produced using speech recognition software and may contain errors related to that system including errors in grammar, punctuation, and spelling, as well as words and phrases that may be inappropriate. If there are any questions or concerns please feel free to contact the dictating provider for clarification.

## 2025-05-31 NOTE — PROGRESS NOTES
This nurse went over discharge instructions and removed PIV with no complications. Printed AVS given to patient. Patient ambulatory and discharged home with wife. Lindsey exchanged for a leg bag per pt request.

## 2025-06-01 LAB
EKG ATRIAL RATE: 78 BPM
EKG DIAGNOSIS: NORMAL
EKG P AXIS: 69 DEGREES
EKG P-R INTERVAL: 198 MS
EKG Q-T INTERVAL: 410 MS
EKG QRS DURATION: 94 MS
EKG QTC CALCULATION (BAZETT): 467 MS
EKG R AXIS: 47 DEGREES
EKG T AXIS: 66 DEGREES
EKG VENTRICULAR RATE: 78 BPM

## 2025-06-01 PROCEDURE — 93010 ELECTROCARDIOGRAM REPORT: CPT | Performed by: INTERNAL MEDICINE

## 2025-06-10 ENCOUNTER — TRANSCRIBE ORDERS (OUTPATIENT)
Dept: ADMINISTRATIVE | Age: 80
End: 2025-06-10

## 2025-06-10 DIAGNOSIS — R31.29 OTHER MICROSCOPIC HEMATURIA: Primary | ICD-10-CM

## 2025-06-18 ENCOUNTER — HOSPITAL ENCOUNTER (OUTPATIENT)
Dept: CT IMAGING | Age: 80
Discharge: HOME | End: 2025-06-18
Attending: SPECIALIST
Payer: MEDICARE

## 2025-06-18 DIAGNOSIS — R31.29 OTHER MICROSCOPIC HEMATURIA: ICD-10-CM

## 2025-06-18 PROCEDURE — 6360000004 HC RX CONTRAST MEDICATION: Performed by: SPECIALIST

## 2025-06-18 PROCEDURE — 74178 CT ABD&PLV WO CNTR FLWD CNTR: CPT

## 2025-06-18 RX ORDER — IOPAMIDOL 755 MG/ML
125 INJECTION, SOLUTION INTRAVASCULAR
Status: COMPLETED | OUTPATIENT
Start: 2025-06-18 | End: 2025-06-18

## 2025-06-18 RX ADMIN — IOPAMIDOL 125 ML: 755 INJECTION, SOLUTION INTRAVENOUS at 07:28

## 2025-06-27 ENCOUNTER — HOSPITAL ENCOUNTER (OUTPATIENT)
Dept: SLEEP CENTER | Age: 80
Discharge: HOME | End: 2025-06-27
Payer: MEDICARE

## 2025-06-27 PROCEDURE — 99214 OFFICE O/P EST MOD 30 MIN: CPT | Performed by: STUDENT IN AN ORGANIZED HEALTH CARE EDUCATION/TRAINING PROGRAM

## 2025-06-27 PROCEDURE — 99212 OFFICE O/P EST SF 10 MIN: CPT

## 2025-06-27 ASSESSMENT — SLEEP AND FATIGUE QUESTIONNAIRES
ESS TOTAL SCORE: 5
HOW LIKELY ARE YOU TO NOD OFF OR FALL ASLEEP WHILE LYING DOWN TO REST IN THE AFTERNOON WHEN CIRCUMSTANCES PERMIT: HIGH CHANCE OF DOZING
HOW LIKELY ARE YOU TO NOD OFF OR FALL ASLEEP WHILE WATCHING TV: SLIGHT CHANCE OF DOZING
HOW LIKELY ARE YOU TO NOD OFF OR FALL ASLEEP WHILE SITTING AND TALKING TO SOMEONE: WOULD NEVER DOZE
HOW LIKELY ARE YOU TO NOD OFF OR FALL ASLEEP WHILE SITTING QUIETLY AFTER LUNCH WITHOUT ALCOHOL: WOULD NEVER DOZE
HOW LIKELY ARE YOU TO NOD OFF OR FALL ASLEEP WHEN YOU ARE A PASSENGER IN A CAR FOR AN HOUR WITHOUT A BREAK: WOULD NEVER DOZE
HOW LIKELY ARE YOU TO NOD OFF OR FALL ASLEEP WHILE SITTING INACTIVE IN A PUBLIC PLACE: WOULD NEVER DOZE
HOW LIKELY ARE YOU TO NOD OFF OR FALL ASLEEP IN A CAR, WHILE STOPPED FOR A FEW MINUTES IN TRAFFIC: WOULD NEVER DOZE
HOW LIKELY ARE YOU TO NOD OFF OR FALL ASLEEP WHILE SITTING AND READING: SLIGHT CHANCE OF DOZING

## 2025-06-27 NOTE — PROGRESS NOTES
[facility]      Hector Pratt MD, FACP, [facility]  William Suggs MD, [facility]  Jaxson Rider MD, [facility]  Nyasia Muller DO  Lesly Stahl DO      [address]  [address]  [place] [address]   PH: [phone]  F: [phone]     Subjective:     Patient ID: Franko Odom is a 79 y.o. male, following up today with the sleep center.     Reason for Follow Up/Chief Complaint:   Chief Complaint   Patient presents with    Sleep Apnea         History: Mr. Odom was initially referred from neurointerventionalist after recent right cerebellar stroke. Sent for evaluation for possible LINDA as stroke-risk identification. He says he was dx'd with LINDA 10+ years ago but refused CPAP at that time.    He underwent PSG for suspected LINDA on 10/21/24 demonstrating severe LINDA .     He is now using CPAP nightly, but is having excessive dry mouth from it, so when he wakes at 3am to empty his urine bag he does not replace the mask-- causing his usage duration to be <4 hours more than half the time. He is not a mouth breather but is using an oral mask.     Social History     Socioeconomic History    Marital status:      Spouse name: Not on file    Number of children: Not on file    Years of education: Not on file    Highest education level: Not on file   Occupational History    Not on file   Tobacco Use    Smoking status: Former     Current packs/day: 0.00     Average packs/day: 1.5 packs/day for 6.0 years (9.0 ttl pk-yrs)     Types: Cigarettes     Start date:      Quit date: 1970     Years since quittin.5    Smokeless tobacco: Never   Vaping Use    Vaping status: Never Used   Substance and Sexual Activity    Alcohol use: Not Currently     Comment: \"Very Seldom, Maybe Once Every Month Or More\"    Drug use: No    Sexual activity: Not Currently   Other Topics Concern    Not on file   Social History Narrative    Not on file     Social Drivers of Health     Financial Resource Strain:

## 2025-07-14 ENCOUNTER — TRANSCRIBE ORDERS (OUTPATIENT)
Dept: ADMINISTRATIVE | Age: 80
End: 2025-07-14

## 2025-07-14 DIAGNOSIS — R42 DIZZINESS: Primary | ICD-10-CM

## 2025-07-23 ENCOUNTER — HOSPITAL ENCOUNTER (OUTPATIENT)
Dept: ULTRASOUND IMAGING | Age: 80
Discharge: HOME | End: 2025-07-23
Payer: MEDICARE

## 2025-07-23 DIAGNOSIS — R42 DIZZINESS: ICD-10-CM

## 2025-07-23 PROCEDURE — 93880 EXTRACRANIAL BILAT STUDY: CPT

## 2025-08-18 RX ORDER — LANOLIN ALCOHOL/MO/W.PET/CERES
100 CREAM (GRAM) TOPICAL DAILY
Qty: 90 TABLET | Refills: 3 | Status: SHIPPED | OUTPATIENT
Start: 2025-08-18

## 2025-08-20 ENCOUNTER — APPOINTMENT (OUTPATIENT)
Dept: GENERAL RADIOLOGY | Age: 80
End: 2025-08-20
Payer: MEDICARE

## 2025-08-20 ENCOUNTER — APPOINTMENT (OUTPATIENT)
Dept: CT IMAGING | Age: 80
End: 2025-08-20
Payer: MEDICARE

## 2025-08-20 ENCOUNTER — HOSPITAL ENCOUNTER (EMERGENCY)
Age: 80
Discharge: HOME | End: 2025-08-20
Attending: EMERGENCY MEDICINE
Payer: MEDICARE

## 2025-08-20 VITALS
RESPIRATION RATE: 14 BRPM | HEART RATE: 78 BPM | HEIGHT: 70 IN | TEMPERATURE: 97.9 F | SYSTOLIC BLOOD PRESSURE: 105 MMHG | BODY MASS INDEX: 26.34 KG/M2 | WEIGHT: 184 LBS | OXYGEN SATURATION: 96 % | DIASTOLIC BLOOD PRESSURE: 73 MMHG

## 2025-08-20 DIAGNOSIS — R42 VERTIGO: Primary | ICD-10-CM

## 2025-08-20 LAB
ALBUMIN SERPL-MCNC: 4.3 G/DL (ref 3.4–5)
ALBUMIN/GLOB SERPL: 1.9 {RATIO}
ALP SERPL-CCNC: 67 U/L (ref 40–129)
ALT SERPL-CCNC: 21 U/L (ref 10–40)
ANION GAP SERPL CALCULATED.3IONS-SCNC: 13 MMOL/L (ref 9–17)
AST SERPL-CCNC: 23 U/L (ref 15–37)
BASOPHILS # BLD: 0.05 K/UL
BASOPHILS NFR BLD: 1 % (ref 0–1)
BILIRUB SERPL-MCNC: 0.4 MG/DL (ref 0–1)
BUN SERPL-MCNC: 20 MG/DL (ref 7–20)
CALCIUM SERPL-MCNC: 9.6 MG/DL (ref 8.3–10.6)
CHLORIDE SERPL-SCNC: 101 MMOL/L (ref 99–110)
CO2 SERPL-SCNC: 24 MMOL/L (ref 21–32)
CREAT SERPL-MCNC: 1.3 MG/DL (ref 0.8–1.3)
EOSINOPHIL # BLD: 0.07 K/UL
EOSINOPHILS RELATIVE PERCENT: 1 % (ref 0–3)
ERYTHROCYTE [DISTWIDTH] IN BLOOD BY AUTOMATED COUNT: 12.9 % (ref 11.7–14.9)
GFR, ESTIMATED: 58 ML/MIN/1.73M2
GLUCOSE SERPL-MCNC: 123 MG/DL (ref 74–99)
HCT VFR BLD AUTO: 41.2 % (ref 42–52)
HGB BLD-MCNC: 14.3 G/DL (ref 13.5–18)
IMM GRANULOCYTES # BLD AUTO: 0.05 K/UL
IMM GRANULOCYTES NFR BLD: 1 %
LYMPHOCYTES NFR BLD: 1.32 K/UL
LYMPHOCYTES RELATIVE PERCENT: 23 % (ref 24–44)
MCH RBC QN AUTO: 31.2 PG (ref 27–31)
MCHC RBC AUTO-ENTMCNC: 34.7 G/DL (ref 32–36)
MCV RBC AUTO: 89.8 FL (ref 78–100)
MONOCYTES NFR BLD: 0.53 K/UL
MONOCYTES NFR BLD: 9 % (ref 0–5)
NEUTROPHILS NFR BLD: 65 % (ref 36–66)
NEUTS SEG NFR BLD: 3.76 K/UL
PLATELET # BLD AUTO: 206 K/UL (ref 140–440)
PMV BLD AUTO: 9.5 FL (ref 7.5–11.1)
POTASSIUM SERPL-SCNC: 4 MMOL/L (ref 3.5–5.1)
PROT SERPL-MCNC: 6.5 G/DL (ref 6.4–8.2)
RBC # BLD AUTO: 4.59 M/UL (ref 4.6–6.2)
SODIUM SERPL-SCNC: 137 MMOL/L (ref 136–145)
TROPONIN I SERPL HS-MCNC: 10 NG/L (ref 0–22)
TROPONIN I SERPL HS-MCNC: 11 NG/L (ref 0–22)
WBC OTHER # BLD: 5.8 K/UL (ref 4–10.5)

## 2025-08-20 PROCEDURE — 70450 CT HEAD/BRAIN W/O DYE: CPT

## 2025-08-20 PROCEDURE — 99285 EMERGENCY DEPT VISIT HI MDM: CPT

## 2025-08-20 PROCEDURE — 85025 COMPLETE CBC W/AUTO DIFF WBC: CPT

## 2025-08-20 PROCEDURE — 84484 ASSAY OF TROPONIN QUANT: CPT

## 2025-08-20 PROCEDURE — 70498 CT ANGIOGRAPHY NECK: CPT

## 2025-08-20 PROCEDURE — 93005 ELECTROCARDIOGRAM TRACING: CPT | Performed by: EMERGENCY MEDICINE

## 2025-08-20 PROCEDURE — 6370000000 HC RX 637 (ALT 250 FOR IP): Performed by: EMERGENCY MEDICINE

## 2025-08-20 PROCEDURE — 71045 X-RAY EXAM CHEST 1 VIEW: CPT

## 2025-08-20 PROCEDURE — 6360000004 HC RX CONTRAST MEDICATION: Performed by: EMERGENCY MEDICINE

## 2025-08-20 PROCEDURE — 80053 COMPREHEN METABOLIC PANEL: CPT

## 2025-08-20 RX ORDER — MECLIZINE HCL 12.5 MG 12.5 MG/1
TABLET ORAL
COMMUNITY
End: 2025-08-29 | Stop reason: ALTCHOICE

## 2025-08-20 RX ORDER — IOPAMIDOL 755 MG/ML
100 INJECTION, SOLUTION INTRAVASCULAR
Status: COMPLETED | OUTPATIENT
Start: 2025-08-20 | End: 2025-08-20

## 2025-08-20 RX ORDER — ONDANSETRON 2 MG/ML
4 INJECTION INTRAMUSCULAR; INTRAVENOUS ONCE
Status: DISCONTINUED | OUTPATIENT
Start: 2025-08-20 | End: 2025-08-21 | Stop reason: HOSPADM

## 2025-08-20 RX ORDER — ASPIRIN 81 MG/1
324 TABLET, CHEWABLE ORAL ONCE
Status: COMPLETED | OUTPATIENT
Start: 2025-08-20 | End: 2025-08-20

## 2025-08-20 RX ORDER — FENTANYL CITRATE 50 UG/ML
25 INJECTION, SOLUTION INTRAMUSCULAR; INTRAVENOUS ONCE
Refills: 0 | Status: DISCONTINUED | OUTPATIENT
Start: 2025-08-20 | End: 2025-08-21 | Stop reason: HOSPADM

## 2025-08-20 RX ADMIN — ASPIRIN 324 MG: 81 TABLET, CHEWABLE ORAL at 18:58

## 2025-08-20 RX ADMIN — IOPAMIDOL 100 ML: 755 INJECTION, SOLUTION INTRAVENOUS at 20:37

## 2025-08-20 ASSESSMENT — PAIN DESCRIPTION - LOCATION: LOCATION: CHEST

## 2025-08-20 ASSESSMENT — PAIN - FUNCTIONAL ASSESSMENT
PAIN_FUNCTIONAL_ASSESSMENT: ACTIVITIES ARE NOT PREVENTED
PAIN_FUNCTIONAL_ASSESSMENT: 0-10
PAIN_FUNCTIONAL_ASSESSMENT: 0-10

## 2025-08-20 ASSESSMENT — PAIN DESCRIPTION - DESCRIPTORS: DESCRIPTORS: DULL

## 2025-08-20 ASSESSMENT — PAIN SCALES - GENERAL
PAINLEVEL_OUTOF10: 2
PAINLEVEL_OUTOF10: 2

## 2025-08-20 ASSESSMENT — PAIN DESCRIPTION - ORIENTATION: ORIENTATION: RIGHT

## 2025-08-21 LAB
EKG ATRIAL RATE: 76 BPM
EKG ATRIAL RATE: 79 BPM
EKG DIAGNOSIS: NORMAL
EKG DIAGNOSIS: NORMAL
EKG P AXIS: 82 DEGREES
EKG P AXIS: 87 DEGREES
EKG P-R INTERVAL: 206 MS
EKG P-R INTERVAL: 212 MS
EKG Q-T INTERVAL: 414 MS
EKG Q-T INTERVAL: 418 MS
EKG QRS DURATION: 100 MS
EKG QRS DURATION: 90 MS
EKG QTC CALCULATION (BAZETT): 465 MS
EKG QTC CALCULATION (BAZETT): 479 MS
EKG R AXIS: 78 DEGREES
EKG R AXIS: 80 DEGREES
EKG T AXIS: 74 DEGREES
EKG T AXIS: 75 DEGREES
EKG VENTRICULAR RATE: 76 BPM
EKG VENTRICULAR RATE: 79 BPM

## 2025-08-21 PROCEDURE — 93010 ELECTROCARDIOGRAM REPORT: CPT | Performed by: INTERNAL MEDICINE

## 2025-08-26 ENCOUNTER — HOSPITAL ENCOUNTER (OUTPATIENT)
Dept: SLEEP CENTER | Age: 80
Discharge: HOME | End: 2025-08-26
Payer: MEDICARE

## 2025-08-26 DIAGNOSIS — G47.33 OSA (OBSTRUCTIVE SLEEP APNEA): Primary | ICD-10-CM

## 2025-08-26 PROCEDURE — 99211 OFF/OP EST MAY X REQ PHY/QHP: CPT

## 2025-08-26 PROCEDURE — 99213 OFFICE O/P EST LOW 20 MIN: CPT | Performed by: PSYCHIATRY & NEUROLOGY

## 2025-08-29 ENCOUNTER — OFFICE VISIT (OUTPATIENT)
Dept: CARDIOLOGY CLINIC | Age: 80
End: 2025-08-29
Payer: MEDICARE

## 2025-08-29 VITALS
WEIGHT: 185 LBS | HEIGHT: 71 IN | HEART RATE: 84 BPM | BODY MASS INDEX: 25.9 KG/M2 | DIASTOLIC BLOOD PRESSURE: 68 MMHG | SYSTOLIC BLOOD PRESSURE: 108 MMHG

## 2025-08-29 DIAGNOSIS — Z86.73 H/O: CVA (CEREBROVASCULAR ACCIDENT): ICD-10-CM

## 2025-08-29 DIAGNOSIS — Z95.1 HISTORY OF CORONARY ARTERY BYPASS GRAFT: ICD-10-CM

## 2025-08-29 DIAGNOSIS — I25.810 CORONARY ATHEROSCLEROSIS OF AUTOLOGOUS ARTERY BYPASS GRAFT WITHOUT ANGINA: Primary | ICD-10-CM

## 2025-08-29 DIAGNOSIS — I10 ESSENTIAL HYPERTENSION: ICD-10-CM

## 2025-08-29 DIAGNOSIS — R42 VERTIGO: ICD-10-CM

## 2025-08-29 DIAGNOSIS — E78.5 DYSLIPIDEMIA: ICD-10-CM

## 2025-08-29 PROCEDURE — 1159F MED LIST DOCD IN RCRD: CPT | Performed by: NURSE PRACTITIONER

## 2025-08-29 PROCEDURE — 99214 OFFICE O/P EST MOD 30 MIN: CPT | Performed by: NURSE PRACTITIONER

## 2025-08-29 PROCEDURE — 3078F DIAST BP <80 MM HG: CPT | Performed by: NURSE PRACTITIONER

## 2025-08-29 PROCEDURE — G8427 DOCREV CUR MEDS BY ELIG CLIN: HCPCS | Performed by: NURSE PRACTITIONER

## 2025-08-29 PROCEDURE — 3074F SYST BP LT 130 MM HG: CPT | Performed by: NURSE PRACTITIONER

## 2025-08-29 PROCEDURE — G8417 CALC BMI ABV UP PARAM F/U: HCPCS | Performed by: NURSE PRACTITIONER

## 2025-08-29 PROCEDURE — 1036F TOBACCO NON-USER: CPT | Performed by: NURSE PRACTITIONER

## 2025-08-29 PROCEDURE — 1123F ACP DISCUSS/DSCN MKR DOCD: CPT | Performed by: NURSE PRACTITIONER

## 2025-08-29 RX ORDER — MECLIZINE HYDROCHLORIDE 25 MG/1
25 TABLET ORAL 3 TIMES DAILY PRN
Qty: 15 TABLET | Refills: 0 | Status: SHIPPED | OUTPATIENT
Start: 2025-08-29

## 2025-08-29 RX ORDER — NITROGLYCERIN 80 MG/1
1 PATCH TRANSDERMAL DAILY
COMMUNITY

## 2025-08-29 RX ORDER — MULTIVITAMIN WITH IRON
1 TABLET ORAL DAILY
COMMUNITY